# Patient Record
Sex: FEMALE | URBAN - METROPOLITAN AREA
[De-identification: names, ages, dates, MRNs, and addresses within clinical notes are randomized per-mention and may not be internally consistent; named-entity substitution may affect disease eponyms.]

---

## 2017-03-10 ENCOUNTER — TRANSFERRED RECORDS (OUTPATIENT)
Dept: HEALTH INFORMATION MANAGEMENT | Facility: CLINIC | Age: 77
End: 2017-03-10

## 2017-04-13 ENCOUNTER — TRANSFERRED RECORDS (OUTPATIENT)
Dept: HEALTH INFORMATION MANAGEMENT | Facility: CLINIC | Age: 77
End: 2017-04-13

## 2017-04-14 ENCOUNTER — TELEPHONE (OUTPATIENT)
Dept: RADIATION ONCOLOGY | Facility: CLINIC | Age: 77
End: 2017-04-14

## 2017-04-14 DIAGNOSIS — C52 MALIGNANT NEOPLASM OF VAGINA (H): Primary | ICD-10-CM

## 2017-04-14 NOTE — TELEPHONE ENCOUNTER
Called Savannah regarding referral from Dr. Geraldine Mcbride for brachytherapy.  Discussed routine would be to schedule pelvic MRI and consultation with MD.  She could decide also to have external beam therapy here if she would want to stay at the [facility].  She said she would think about that option.  Rad Onc  will call her with appointments.

## 2017-04-19 ENCOUNTER — HOSPITAL ENCOUNTER (OUTPATIENT)
Dept: MRI IMAGING | Facility: CLINIC | Age: 77
Discharge: HOME | End: 2017-04-19
Attending: RADIOLOGY | Admitting: RADIOLOGY
Payer: MEDICARE

## 2017-04-19 ENCOUNTER — OFFICE VISIT (OUTPATIENT)
Dept: RADIATION ONCOLOGY | Facility: CLINIC | Age: 77
End: 2017-04-19
Attending: RADIOLOGY
Payer: MEDICARE

## 2017-04-19 VITALS
BODY MASS INDEX: 25.1 KG/M2 | SYSTOLIC BLOOD PRESSURE: 155 MMHG | HEIGHT: 64 IN | DIASTOLIC BLOOD PRESSURE: 88 MMHG | WEIGHT: 147 LBS

## 2017-04-19 DIAGNOSIS — C53.1 MALIGNANT NEOPLASM OF EXOCERVIX (H): Primary | ICD-10-CM

## 2017-04-19 DIAGNOSIS — C52 MALIGNANT NEOPLASM OF VAGINA (H): ICD-10-CM

## 2017-04-19 PROCEDURE — 72197 MRI PELVIS W/O & W/DYE: CPT

## 2017-04-19 PROCEDURE — 00000346 ZZHCL STATISTIC REVIEW OUTSIDE SLIDES TC 88321: Performed by: RADIOLOGY

## 2017-04-19 PROCEDURE — A9585 GADOBUTROL INJECTION: HCPCS | Performed by: RADIOLOGY

## 2017-04-19 PROCEDURE — 99215 OFFICE O/P EST HI 40 MIN: CPT | Mod: 25 | Performed by: RADIOLOGY

## 2017-04-19 PROCEDURE — 00000159 ZZHCL STATISTIC H-SEND OUTS PREP: Performed by: RADIOLOGY

## 2017-04-19 PROCEDURE — 87624 HPV HI-RISK TYP POOLED RSLT: CPT | Performed by: RADIOLOGY

## 2017-04-19 PROCEDURE — 25500064 ZZH RX 255 OP 636: Performed by: RADIOLOGY

## 2017-04-19 RX ORDER — ALBUTEROL SULFATE 90 UG/1
2 AEROSOL, METERED RESPIRATORY (INHALATION) EVERY 6 HOURS PRN
COMMUNITY

## 2017-04-19 RX ORDER — GADOBUTROL 604.72 MG/ML
7.5 INJECTION INTRAVENOUS ONCE
Status: COMPLETED | OUTPATIENT
Start: 2017-04-19 | End: 2017-04-19

## 2017-04-19 RX ADMIN — GADOBUTROL 7 ML: 604.72 INJECTION INTRAVENOUS at 08:32

## 2017-04-19 ASSESSMENT — ENCOUNTER SYMPTOMS
TINGLING: 0
PND: 0
WHEEZING: 0
NAUSEA: 0
CLAUDICATION: 0
FOCAL WEAKNESS: 0
EYE REDNESS: 0
FREQUENCY: 0
HEARTBURN: 0
CONSTIPATION: 0
FLANK PAIN: 0
INSOMNIA: 0
DEPRESSION: 0
HEMOPTYSIS: 0
FEVER: 0
EYE DISCHARGE: 0
SPEECH CHANGE: 0
SHORTNESS OF BREATH: 0
NERVOUS/ANXIOUS: 0
WEAKNESS: 0
NECK PAIN: 0
SENSORY CHANGE: 0
DYSURIA: 0
EYE PAIN: 0
BLURRED VISION: 0
HALLUCINATIONS: 0
DIZZINESS: 0
FALLS: 0
PHOTOPHOBIA: 0
CHILLS: 0
ORTHOPNEA: 0
DIAPHORESIS: 0
SEIZURES: 0
TREMORS: 0
DIARRHEA: 0
WEIGHT LOSS: 0
VOMITING: 0
HEADACHES: 0
DOUBLE VISION: 0
PALPITATIONS: 0
POLYDIPSIA: 0
MYALGIAS: 0
BACK PAIN: 0
LOSS OF CONSCIOUSNESS: 0
BRUISES/BLEEDS EASILY: 0
BLOOD IN STOOL: 0
SPUTUM PRODUCTION: 0
SORE THROAT: 0
ABDOMINAL PAIN: 0
STRIDOR: 0
COUGH: 0
HEMATURIA: 0
MEMORY LOSS: 0

## 2017-04-19 ASSESSMENT — LIFESTYLE VARIABLES: SUBSTANCE_ABUSE: 0

## 2017-04-19 NOTE — PROGRESS NOTES
HPI  INITIAL PATIENT ASSESSMENT    Diagnosis: Vaginal Cancer    Prior radiation therapy: None    Prior chemotherapy: None    Prior hormonal therapy:No    Pain Eval:  Denies    Psychosocial  Living arrangements: Live with   Fall Risk: independent   referral needs: Not needed    Advanced Directive: No  Implantable Cardiac Device? No    Onset of menarche: 15  LMP: No LMP recorded.  Onset of menopause: 42  Abnormal vaginal bleeding/discharge: Yes, has been spotting for 2 months  Are you pregnant? No      Review of Systems   Constitutional: Positive for malaise/fatigue. Negative for chills, diaphoresis, fever and weight loss.   HENT: Positive for tinnitus. Negative for congestion, ear discharge, ear pain, hearing loss, nosebleeds and sore throat.         Baseline tinnitus   Eyes: Negative for blurred vision, double vision, photophobia, pain, discharge and redness.   Respiratory: Negative for cough, hemoptysis, sputum production, shortness of breath, wheezing and stridor.    Cardiovascular: Negative for chest pain, palpitations, orthopnea, claudication, leg swelling and PND.   Gastrointestinal: Negative for abdominal pain, blood in stool, constipation, diarrhea, heartburn, melena, nausea and vomiting.   Genitourinary: Negative for dysuria, flank pain, frequency, hematuria and urgency.   Musculoskeletal: Negative for back pain, falls, joint pain, myalgias and neck pain.   Skin: Negative for itching and rash.   Neurological: Negative for dizziness, tingling, tremors, sensory change, speech change, focal weakness, seizures, loss of consciousness, weakness and headaches.   Endo/Heme/Allergies: Negative for environmental allergies and polydipsia. Does not bruise/bleed easily.   Psychiatric/Behavioral: Negative for depression, hallucinations, memory loss, substance abuse and suicidal ideas. The patient is not nervous/anxious and does not have insomnia.

## 2017-04-19 NOTE — MR AVS SNAPSHOT
After Visit Summary   4/19/2017    Savannah Camacho    MRN: [id number]           Patient Information     Date Of Birth          1940        Visit Information        Provider Department      4/19/2017 9:00 AM Michelle Carrion MD Radiation Oncology Clinic        Today's Diagnoses     Malignant neoplasm of exocervix (H)    -  1       Follow-ups after your visit        Your next 10 appointments already scheduled     Apr 20, 2017 10:30 AM CDT   PET ONCOLOGY WHOLE BODY with UMET1   [facility] ([facility])    [address] [address]   [phone]           Tell your doctor:   If there is any chance you may be pregnant or if you are breastfeeding.   If you have problems lying in small spaces (claustrophobia). If you do, your doctor may give you medicine to help you relax. If you have diabetes:   Have your exam early in the morning. Your blood glucose will go up as the day goes by.   Your glucose level must be 180 or less at the start of the exam. Please take any medicines you need to ensure this blood glucose level. 24 hours before your scan: Don t do any heavy exercise. (No jogging, aerobics or other workouts.) Exercise will make your pictures less accurate. 6 hours before your scan:   Stop all food and liquids (except water).   Do not chew gum or suck on mints.   If you need to take medicine with food, you may take it with a few crackers.  Please call your Imaging Department at your exam site with any questions.            Apr 20, 2017  2:00 PM CDT   TCT/SIM Suite Visit with Michelle Carrion MD   Radiation Oncology Clinic ([facility])    [facility]  1st Floor  [address] [address] [phone]              Future tests that were ordered for you today     Open Future Orders        Priority Expected Expires Ordered    PET Oncology Whole Body Routine  4/19/2018 4/19/2017    NM PET Eyes to  "thigh chest abd pelvis w/o IV Routine  2018            Who to contact     Please call your clinic at [phone] to:    Ask questions about your health    Make or cancel appointments    Discuss your medicines    Learn about your test results    Speak to your doctor   If you have compliments or concerns about an experience at your clinic, or if you wish to file a complaint, please contact [facility] Patient Relations at [phone] or email us at [email or web address]         Additional Information About Your Visit        Scann[employer] Information     AlignMed is an electronic gateway that provides easy, online access to your medical records. With AlignMed, you can request a clinic appointment, read your test results, renew a prescription or communicate with your care team.     To sign up for AlignMed visit the website at [email or web address]   You will be asked to enter the access code listed below, as well as some personal information. Please follow the directions to create your username and password.     Your access code is: [email or web address]  Expires: 2017 11:20 AM     Your access code will  in 90 days. If you need help or a new code, please contact your [facility] Clinic or call [phone] for assistance.        Care EveryWhere ID     This is your Care EveryWhere ID. This could be used by other organizations to access your [facility] medical records  [id number]        Your Vitals Were     Height BMI (Body Mass Index)                1.626 m (5' 4\") 25.23 kg/m2           Blood Pressure from Last 3 Encounters:   17 155/88    Weight from Last 3 Encounters:   17 66.7 kg (147 lb)               Primary Care Provider Office Phone # Fax #    Adán Lanier [phone]       [place] [address] [address] [facility] [address]        Thank you!     Thank you for choosing [facility]  for " your care. Our goal is always to provide you with excellent care. Hearing back from our patients is one way we can continue to improve our services. Please take a few minutes to complete the written survey that you may receive in the mail after your visit with us. Thank you!             Your Updated Medication List - Protect others around you: Learn how to safely use, store and throw away your medicines at www.disposemymeds.org.          This list is accurate as of: 4/19/17 11:20 AM.  Always use your most recent med list.                   Brand Name Dispense Instructions for use    albuterol 108 (90 BASE) MCG/ACT Inhaler    PROAIR HFA/PROVENTIL HFA/VENTOLIN HFA     Inhale 2 puffs into the lungs every 6 hours       ASPIRIN PO      Take 81 mg by mouth       COREG PO      Take 6.25 mg by mouth       HYDROCHLOROTHIAZIDE PO      Take 12.5 mg by mouth daily       LISINOPRIL PO      Take 5 mg by mouth       MULTIVITAMIN ADULT PO          SIMVASTATIN PO      Take 40 mg by mouth       SINGULAIR PO      Take 10 mg by mouth

## 2017-04-19 NOTE — LETTER
2017       RE: Savannah Camacho  [address]   [place] [facility] [address]     Dear Colleague,    Thank you for referring your patient, Savannah Camacho, to the RAD[facility] ONCOL[facility]Y CLINIC. Please see a copy of my visit note below.    Dictation pendin      HPI  INITIAL PATIENT ASSESSMENT    Diagnosis: Vaginal Cancer    Prior radiation therapy: None    Prior chemotherapy: None    Prior hormonal therapy:No    Pain Eval:  Denies    Psychosocial  Living arrangements: Live with   Fall Risk: independent   referral needs: Not needed    Advanced Directive: No  Implantable Cardiac Device? No    Onset of menarche: 15  LMP: No LMP recorded.  Onset of menopause: 42  Abnormal vaginal bleeding/discharge: Yes, has been spotting for 2 months  Are you pregnant? No      Review of Systems   Constitutional: Positive for malaise/fatigue. Negative for chills, diaphoresis, fever and weight loss.   HENT: Positive for tinnitus. Negative for congestion, ear discharge, ear pain, hearing loss, nosebleeds and sore throat.         Baseline tinnitus   Eyes: Negative for blurred vision, double vision, photophobia, pain, discharge and redness.   Respiratory: Negative for cough, hemoptysis, sputum production, shortness of breath, wheezing and stridor.    Cardiovascular: Negative for chest pain, palpitations, orthopnea, claudication, leg swelling and PND.   Gastrointestinal: Negative for abdominal pain, blood in stool, constipation, diarrhea, heartburn, melena, nausea and vomiting.   Genitourinary: Negative for dysuria, flank pain, frequency, hematuria and urgency.   Musculoskeletal: Negative for back pain, falls, joint pain, myalgias and neck pain.   Skin: Negative for itching and rash.   Neurological: Negative for dizziness, tingling, tremors, sensory change, speech change, focal weakness, seizures, loss of consciousness, weakness and headaches.   Endo/Heme/Allergies: Negative for environmental allergies and  polydipsia. Does not bruise/bleed easily.   Psychiatric/Behavioral: Negative for depression, hallucinations, memory loss, substance abuse and suicidal ideas. The patient is not nervous/anxious and does not have insomnia.                  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      HISTORY OF PRESENT ILLNESS:  Ms. Camacho is a 77-year-old female with a recent diagnosis of squamous cell carcinoma of the cervix, FIGO stage IIB.  Her history dates back to 2012 when she initially presented with vaginal discharge and dysuria.  A physical exam at that time demonstrated a plaque in the right upper vagina, which was biopsied on 09/21/2012.  This was positive only for hyperkeratosis with no evidence of dysplasia or malignancy.  She was treated with topical steroids which led to significant improvement and eventual resolution of the plaque.  She continued to do well for several years.  She had a routine Pap smear on 07/01/2014.  This demonstrated atypical squamous cells of undetermined significance.  She did not have any additional workup for this at that time.     More recently, she was seen by Dr. Mcdonough on 02/23/2017 for increasing vaginal discharge which she described as brown colored and foul smelling.  A physical exam at that time demonstrated a plaque on the anterior vaginal wall with extension into the right fornix.  A Pap smear was completed which showed atypical squamous cells of undetermined significance.  HPV was negative.  She was then taken to the OR on 03/10/2017 for an exam under anesthesia completed by Dr. Mcdonough.  There was proximal vaginal thickening of both the anterior and posterior vaginal walls, noted to be primarily on the right side with narrowing of the proximal vaginal canal.  No cervix was able to be visualized or palpated on bimanual exam.  A colposcopy was also completed at that time,  which showed a confluent area of lesions in the proximal vagina mostly confined to the anterior wall.  This area measured approximately 2 x 3 cm and appeared to have a whitish appearance.  There was extension into the right vaginal fornix, where there was an area of focal necrosis.   Biopsies were taken of the right vaginal wall and posterior vaginal wal.  Both of these were positive for well-differentiated squamous cell carcinoma.  A blind biopsy was also taken of the cervix which was positive only for squamous dysplasia.  At this time, she was diagnosed with squamous cell carcinoma of the vagina, as there was no clear involvement of the cervix.      She was subsequently referred to both Dr. Valdez with Gynecologic Oncology and Dr. Chandler with Radiation Oncology.  It was recommended that she undergo evaluation with a CT of the chest, abdomen and pelvis which was completed on 04/13/2017.  The report stated that the uterus and adnexa appeared grossly unremarkable with no pelvic adenopathy appreciated and no free fluid within the pelvis.  There was no comment on the appearance of the vagina or cervix.  However, as Dr. Chandler noted, the proximal vagina does appear to be thickened primarily on the right side.  She met with Dr. Valdez and Dr. Chandler later that day.  They both explained that she was currently felt to have a squamous cell carcinoma of the vagina, which is a rare disease with no large scale studies to provide well-established treatment recommendations.  Dr. Valdez did not feel that she would be a candidate for surgery and felt that she would likely require radiation therapy likely with concurrent chemotherapy.  Dr. Chandler agreed with this recommendation and explained that she require external beam radiation therapy to the pelvis followed by a brachytherapy boost.  He subsequently referred her to our clinic for further discussion regarding treatment with brachytherapy.      Prior to our meeting today,  Ms. Camacho underwent further evaluation with a pelvic MRI.  This demonstrated a large mass arising from the upper vagina on the right side with involvement of the right lateral cervix, posterior proximal vaginal wall, and possible involvement of the mid rectum.  There was evidence of parametrial involvement on the right. The official report was not available at the time of consultation; however, we were able to briefly discuss the images with Radiology prior to our visit.  Clinically, Ms. Camacho reports feeling very well.  She reports intermittent light spotting.  She denies any abdominal pain or discomfort.  She otherwise feels in her normal state of health and remainder of ROS is otherwise negative.      PAST MEDICAL HISTORY:   1.  Asthma.   2.  Congestive heart failure.   3.  Hyperlipidemia.   4.  History of lung nodule in right apex.   5.  History of myocardial infarction.   6.  Cervical cancer, per HPI.      PAST SURGICAL HISTORY:     1.  Biopsy of the vaginal mass, per HPI.   2.  Heart catheterization with stent placement in 2009.        CHEMOTHERAPY HISTORY:  None.        RADIATION THERAPY HISTORY:  None.        PREGNANCY STATUS:  No.  The patient is postmenopausal.      IMPLANTED CARDIAC DEVICE:  No.        CURRENT MEDICATIONS:   1.  Singulair.   2.  Hydrochlorothiazide.   3.  Lisinopril.   4.  Simvastatin.   5.  Carvedilol.   6.  Albuterol.   7.  Aspirin, 81 mg daily.      ALLERGIES:  Amoxicillin.      SOCIAL HISTORY:  Ms. Camacho is  and lives with her  in [place], [place].  She is currently retired.  She has no history of tobacco use.  She drinks alcohol on social occasions.      FAMILY HISTORY:  Daughter had breast cancer.      REVIEW OF SYSTEMS:  A 10-point review of systems was performed.  Pertinent findings are noted in the HPI.      PHYSICAL EXAMINATION:   VITAL SIGNS:  Blood pressure 155/88, weight 66.7 kg. BMI 25.23 kg/m2.   GENERAL:  Alert, in no acute distress.    PULMONARY:  No wheezing, stridor or respiratory distress.   CARDIOVASCULAR:  Well perfused, no cyanosis, no pedal edema.     GYN:  Normal external genitalia  Erythematous area in the anterior proximal vaginal wall with white plaque.  This area measures at least 3 cm x 3 cm.  No clear mucosal changes of the posterior vaginal wall.  There is significant stenosis at the upper vagina and the cervix could not be visualized nor palpated.  The right vaginal fornix has evidence of recent biopsy but dos appear necrotic.    MUSCULOSKELETAL:  Normal muscle bulk and tone.   SKIN:  Normal color and turgor.      ASSESSMENT:  Ms. Camacho is a 77-year-old female with original diagnosis of squamous cell carcinoma of the vagina with MRI today demonstrating involvement of the cervix and potential involvement of the middle rectum.  Thus, her diagnosis is now squamous cell carcinoma of the cervix, FIGO stage IIB.      PLAN:  We reviewed the patient's clinical history including her presentation, diagnosis and workup including the MRI obtained from earlier today.  We explained that her MRI from today demonstrates involvement of the cervix.  For this reason, she is considered to have cervical cancer as opposed to vaginal cancer.  The MRI also showed parametrial involvement with disease confined to the proximal two-thirds of the vagina.  There does appear to be an area concerning for possible involvement within the mid rectum as well.  We explained that the treatment for locally advanced cervical cancer generally includes concurrent chemotherapy and radiation.  As with vaginal cancer, the radiation would consist of external beam radiation to the pelvis followed by an interstitial brachytherapy boost.  The reason for interstitial brachytherapy as opposed to intracavitary brachytherapy is due to extensive vaginal invovlement which could not be covered with tandem and ring.  We would also recommend that she undergo further evaluation with a  PET CT scan.  We would like her to be evaluated by one of the gynecologic oncologists at [facility].  We will plan to discuss her case at Gynecology Tumor Conference tomorrow morning.  Given the new diagnosis of cervical cancer, Ms. Camacho would like to have both her external beam radiation and brachytherapy completed at [facility].  Given that she lives in [place], we will schedule her for a CT simulation and PET CT to be completed tomorrow.  We reviewed the process of, side effects and potential complications of both external beam radiation therapy to the pelvis and interstitial brachytherapy.  The patient, her  and son all asked appropriate questions, which were answered to their satisfaction and verbalized understanding.  An informed consent was signed.  She will return tomorrow for CT simulation.        Bobby Ward MD   Resident, PGY-4  Radiation Oncology     I saw and examined the patient with the resident.  I have reviewed and agree with the resident's note and plan of care.      Michelle Carrion MD

## 2017-04-20 ENCOUNTER — ALLIED HEALTH/NURSE VISIT (OUTPATIENT)
Dept: RADIATION ONCOLOGY | Facility: CLINIC | Age: 77
End: 2017-04-20
Attending: RADIOLOGY
Payer: MEDICARE

## 2017-04-20 ENCOUNTER — CARE COORDINATION (OUTPATIENT)
Dept: ONCOLOGY | Facility: CLINIC | Age: 77
End: 2017-04-20

## 2017-04-20 ENCOUNTER — ONCOLOGY VISIT (OUTPATIENT)
Dept: ONCOLOGY | Facility: CLINIC | Age: 77
End: 2017-04-20
Attending: OBSTETRICS & GYNECOLOGY
Payer: MEDICARE

## 2017-04-20 VITALS
HEIGHT: 64 IN | BODY MASS INDEX: 24.34 KG/M2 | DIASTOLIC BLOOD PRESSURE: 76 MMHG | WEIGHT: 142.6 LBS | OXYGEN SATURATION: 98 % | SYSTOLIC BLOOD PRESSURE: 135 MMHG | HEART RATE: 72 BPM | RESPIRATION RATE: 16 BRPM

## 2017-04-20 DIAGNOSIS — C53.1 MALIGNANT NEOPLASM OF EXOCERVIX (H): Primary | ICD-10-CM

## 2017-04-20 DIAGNOSIS — C52 VAGINAL CANCER (H): Primary | ICD-10-CM

## 2017-04-20 PROBLEM — C53.9 CERVIX CANCER (H): Status: ACTIVE | Noted: 2017-04-20

## 2017-04-20 PROCEDURE — 99205 OFFICE O/P NEW HI 60 MIN: CPT | Mod: ZP | Performed by: OBSTETRICS & GYNECOLOGY

## 2017-04-20 PROCEDURE — 77470 SPECIAL RADIATION TREATMENT: CPT | Performed by: RADIOLOGY

## 2017-04-20 PROCEDURE — 99212 OFFICE O/P EST SF 10 MIN: CPT | Mod: ZF

## 2017-04-20 PROCEDURE — 77334 RADIATION TREATMENT AID(S): CPT | Performed by: RADIOLOGY

## 2017-04-20 ASSESSMENT — PAIN SCALES - GENERAL: PAINLEVEL: NO PAIN (0)

## 2017-04-20 NOTE — MR AVS SNAPSHOT
After Visit Summary   2017    Savannah Camacho    MRN: [id number]           Patient Information     Date Of Birth          1940        Visit Information        Provider Department      2017 2:00 PM Michelle Carrion MD Radiation Oncology Clinic        Today's Diagnoses     Vaginal cancer (H)    -  1       Follow-ups after your visit        Future tests that were ordered for you today     Open Future Orders        Priority Expected Expires Ordered    NM PET Eyes to thigh chest abd pelvis w/o IV Routine  2018            Who to contact     Please call your clinic at [phone] to:    Ask questions about your health    Make or cancel appointments    Discuss your medicines    Learn about your test results    Speak to your doctor   If you have compliments or concerns about an experience at your clinic, or if you wish to file a complaint, please contact [facility] Patient Relations at [phone] or email us at [email or web address]         Additional Information About Your Visit        MyChart Information     [employer] [place] [employer] is an electronic gateway that provides easy, online access to your medical records. With [employer] [place] [employer], you can request a clinic appointment, read your test results, renew a prescription or communicate with your care team.     To sign up for [employer] & Awayt visit the website at [email or web address]   You will be asked to enter the access code listed below, as well as some personal information. Please follow the directions to create your username and password.     Your access code is: [email or web address]  Expires: 2017 11:20 AM     Your access code will  in 90 days. If you need help or a new code, please contact your [facility] or call [phone] for assistance.        Care EveryWhere ID     This is your Care EveryWhere ID. This could be used by other organizations to access your [facility]  records  [id number]         Blood Pressure from Last 3 Encounters:   04/19/17 155/88    Weight from Last 3 Encounters:   04/19/17 66.7 kg (147 lb)              Today, you had the following     No orders found for display       Primary Care Provider Office Phone # Fax #    Adán Lanier [phone]       [address]   [facility] [address]        Thank you!     Thank you for choosing [facility]  for your care. Our goal is always to provide you with excellent care. Hearing back from our patients is one way we can continue to improve our services. Please take a few minutes to complete the written survey that you may receive in the mail after your visit with us. Thank you!             Your Updated Medication List - Protect others around you: Learn how to safely use, store and throw away your medicines at www.disposemymeds.org.          This list is accurate as of: 4/20/17  3:01 PM.  Always use your most recent med list.                   Brand Name Dispense Instructions for use    albuterol 108 (90 BASE) MCG/ACT Inhaler    PROAIR HFA/PROVENTIL HFA/VENTOLIN HFA     Inhale 2 puffs into the lungs every 6 hours       ASPIRIN PO      Take 81 mg by mouth       COREG PO      Take 6.25 mg by mouth       HYDROCHLOROTHIAZIDE PO      Take 12.5 mg by mouth daily       LISINOPRIL PO      Take 5 mg by mouth       MULTIVITAMIN ADULT PO          SIMVASTATIN PO      Take 40 mg by mouth       SINGULAIR PO      Take 10 mg by mouth

## 2017-04-20 NOTE — PROGRESS NOTES
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      HISTORY OF PRESENT ILLNESS:  Ms. Camacho is a 77-year-old female with a recent diagnosis of squamous cell carcinoma of the cervix, FIGO stage IIB.  Her history dates back to 2012 when she initially presented with vaginal discharge and dysuria.  A physical exam at that time demonstrated a plaque in the right upper vagina, which was biopsied on 09/21/2012.  This was positive only for hyperkeratosis with no evidence of dysplasia or malignancy.  She was treated with topical steroids which led to significant improvement and eventual resolution of the plaque.  She continued to do well for several years.  She had a routine Pap smear on 07/01/2014.  This demonstrated atypical squamous cells of undetermined significance.  She did not have any additional workup for this at that time.     More recently, she was seen by Dr. Mcdonough on 02/23/2017 for increasing vaginal discharge which she described as brown colored and foul smelling.  A physical exam at that time demonstrated a plaque on the anterior vaginal wall with extension into the right fornix.  A Pap smear was completed which showed atypical squamous cells of undetermined significance.  HPV was negative.  She was then taken to the OR on 03/10/2017 for an exam under anesthesia completed by Dr. Mcdonough.  There was proximal vaginal thickening of both the anterior and posterior vaginal walls, noted to be primarily on the right side with narrowing of the proximal vaginal canal.  No cervix was able to be visualized or palpated on bimanual exam.  A colposcopy was also completed at that time, which showed a confluent area of lesions in the proximal vagina mostly confined to the anterior wall.  This area measured approximately 2 x 3 cm and appeared to have a whitish appearance.  There was extension into the right vaginal  fornix, where there was an area of focal necrosis.   Biopsies were taken of the right vaginal wall and posterior vaginal wal.  Both of these were positive for well-differentiated squamous cell carcinoma.  A blind biopsy was also taken of the cervix which was positive only for squamous dysplasia.  At this time, she was diagnosed with squamous cell carcinoma of the vagina, as there was no clear involvement of the cervix.      She was subsequently referred to both Dr. Valdez with Gynecologic Oncology and Dr. Chandler with Radiation Oncology.  It was recommended that she undergo evaluation with a CT of the chest, abdomen and pelvis which was completed on 04/13/2017.  The report stated that the uterus and adnexa appeared grossly unremarkable with no pelvic adenopathy appreciated and no free fluid within the pelvis.  There was no comment on the appearance of the vagina or cervix.  However, as Dr. Chandler noted, the proximal vagina does appear to be thickened primarily on the right side.  She met with Dr. Valdez and Dr. Chandler later that day.  They both explained that she was currently felt to have a squamous cell carcinoma of the vagina, which is a rare disease with no large scale studies to provide well-established treatment recommendations.  Dr. Valdez did not feel that she would be a candidate for surgery and felt that she would likely require radiation therapy likely with concurrent chemotherapy.  Dr. Chandler agreed with this recommendation and explained that she require external beam radiation therapy to the pelvis followed by a brachytherapy boost.  He subsequently referred her to our clinic for further discussion regarding treatment with brachytherapy.      Prior to our meeting today, Ms. Camacho underwent further evaluation with a pelvic MRI.  This demonstrated a large mass arising from the upper vagina on the right side with involvement of the right lateral cervix, posterior proximal vaginal wall, and possible  involvement of the mid rectum.  There was evidence of parametrial involvement on the right. The official report was not available at the time of consultation; however, we were able to briefly discuss the images with Radiology prior to our visit.  Clinically, Ms. Camacho reports feeling very well.  She reports intermittent light spotting.  She denies any abdominal pain or discomfort.  She otherwise feels in her normal state of health and remainder of ROS is otherwise negative.      PAST MEDICAL HISTORY:   1.  Asthma.   2.  Congestive heart failure.   3.  Hyperlipidemia.   4.  History of lung nodule in right apex.   5.  History of myocardial infarction.   6.  Cervical cancer, per HPI.      PAST SURGICAL HISTORY:     1.  Biopsy of the vaginal mass, per HPI.   2.  Heart catheterization with stent placement in 2009.        CHEMOTHERAPY HISTORY:  None.        RADIATION THERAPY HISTORY:  None.        PREGNANCY STATUS:  No.  The patient is postmenopausal.      IMPLANTED CARDIAC DEVICE:  No.        CURRENT MEDICATIONS:   1.  Singulair.   2.  Hydrochlorothiazide.   3.  Lisinopril.   4.  Simvastatin.   5.  Carvedilol.   6.  Albuterol.   7.  Aspirin, 81 mg daily.      ALLERGIES:  Amoxicillin.      SOCIAL HISTORY:  Ms. Caamcho is  and lives with her  in [place], [place].  She is currently retired.  She has no history of tobacco use.  She drinks alcohol on social occasions.      FAMILY HISTORY:  Daughter had breast cancer.      REVIEW OF SYSTEMS:  A 10-point review of systems was performed.  Pertinent findings are noted in the HPI.      PHYSICAL EXAMINATION:   VITAL SIGNS:  Blood pressure 155/88, weight 66.7 kg. BMI 25.23 kg/m2.   GENERAL:  Alert, in no acute distress.   PULMONARY:  No wheezing, stridor or respiratory distress.   CARDIOVASCULAR:  Well perfused, no cyanosis, no pedal edema.     GYN:  Normal external genitalia  Erythematous area in the anterior proximal vaginal wall with white plaque.   This area measures at least 3 cm x 3 cm.  No clear mucosal changes of the posterior vaginal wall.  There is significant stenosis at the upper vagina and the cervix could not be visualized nor palpated.  The right vaginal fornix has evidence of recent biopsy but dos appear necrotic.    MUSCULOSKELETAL:  Normal muscle bulk and tone.   SKIN:  Normal color and turgor.      ASSESSMENT:  Ms. Camacho is a 77-year-old female with original diagnosis of squamous cell carcinoma of the vagina with MRI today demonstrating involvement of the cervix and potential involvement of the middle rectum.  Thus, her diagnosis is now squamous cell carcinoma of the cervix, FIGO stage IIB.      PLAN:  We reviewed the patient's clinical history including her presentation, diagnosis and workup including the MRI obtained from earlier today.  We explained that her MRI from today demonstrates involvement of the cervix.  For this reason, she is considered to have cervical cancer as opposed to vaginal cancer.  The MRI also showed parametrial involvement with disease confined to the proximal two-thirds of the vagina.  There does appear to be an area concerning for possible involvement within the mid rectum as well.  We explained that the treatment for locally advanced cervical cancer generally includes concurrent chemotherapy and radiation.  As with vaginal cancer, the radiation would consist of external beam radiation to the pelvis followed by an interstitial brachytherapy boost.  The reason for interstitial brachytherapy as opposed to intracavitary brachytherapy is due to extensive vaginal invovlement which could not be covered with tandem and ring.  We would also recommend that she undergo further evaluation with a PET CT scan.  We would like her to be evaluated by one of the gynecologic oncologists at [facility].  We will plan to discuss her case at Gynecology Tumor Conference tomorrow morning.  Given the new diagnosis  of cervical cancer, Ms. Camacho would like to have both her external beam radiation and brachytherapy completed at [facility].  Given that she lives in [place], we will schedule her for a CT simulation and PET CT to be completed tomorrow.  We reviewed the process of, side effects and potential complications of both external beam radiation therapy to the pelvis and interstitial brachytherapy.  The patient, her  and son all asked appropriate questions, which were answered to their satisfaction and verbalized understanding.  An informed consent was signed.  She will return tomorrow for CT simulation.        Bobby Ward MD   Resident, PGY-4  Radiation Oncology     I saw and examined the patient with the resident.  I have reviewed and agree with the resident's note and plan of care.      Michelle Carrion MD

## 2017-04-20 NOTE — PROGRESS NOTES
Consult Notes on Referred Patient        Dr. Adán Lanier MD  [address] [place] [address]       RE: Savannah Camacho  : 1940  FEMI: 2017    Dear Dr. Adán Lanier:    I had the pleasure of seeing your patient Savannah Camacho here at the Gynecologic Cancer Clinic at the [facility] on 2017.  As you know she is a very pleasant 77 year old woman with a recent diagnosis of clinical stage IIA2, radiographic stage IIB.  Given these findings she was subsequently sent to the Gynecologic Cancer Clinic for new patient consultation.  She is accompanied today by her  and son.    Cancer Course:    She initially sought care from a gynecologist in 2012 at which time she was noted to have a plaque of her vagina which was biopsied and found to be hyperkeratosis.  She was thus treated with steroids and kenolog with substantial improvement in her symptoms.  She then had a pap smear with ASCUS, HPV negative in .  Most recently she has developed increasing, foul smelling vaginal discharge and again sought care from her gynecologist who rightfully took her to the OR for EUA and biopsies.  On EUA she was found to have a confluent area of lesions in the proximal vagina mostly confined to the anterior wall. This area measured approximately 2 x 3 cm and appeared to have a whitish appearance. There was extension into the right vaginal fornix, where there was an area of necrosis appreciated. Again, the cervix was not able to be visualized on exam. Biopsies were taken of the right vaginal wall and posterior vaginal wall. Both of these were positive for well-differentiated squamous cell carcinoma.     3/10/17:  Upper vaginal biopsy:  Well differentiated squamous cell carcinoma    17:  MRI Pelvis:  Large exophytic heterogeneously enhancing with necrotic areas is seen arising from the upper vagina/fornix on the right side measuring 3.4 x 3.6 x 4 cm.  Superiorly the lesion appears to involve the right lateral aspect of the cervix with extension into the parametrium. No extension into the cervical canal. No definite extension into the uterus. Posteriorly there is involvement of the posterior wall of the upper vagina with extension across the midline to the left side. The lesion also abuts the midportion of the rectum with possible involvement.  Thrombosis of a right parametrial venous structure is noted.  A prominent right internal iliac lymph node measures 5 mm in short axis.  Small amount of free fluid is noted in the pelvis. Visualized bowel loops otherwise appear unremarkable. Pelvic musculature is of normal appearance. No suspicious osseous lesions.    17:  PET/CT:  Results pending           Review of Systems:  Systemic           no weight changes; no fever; no chills; no night sweats; no appetite changes  Skin           no rashes, or lesions  Eye           no irritation; no changes in vision  Marlene-Laryngeal           no dysphagia; no hoarseness   Pulmonary    no cough; no shortness of breath  Cardiovascular    no chest pain; no palpitations  Gastrointestinal    no diarrhea; no constipation; no abdominal pain; no changes in bowel  habits; no blood in stool  Genitourinary   no urinary frequency; no urinary urgency; no dysuria; no pain; + abnormal vaginal discharge; + abnormal vaginal bleeding  Breast    no breast discharge; no breast changes; no breast pain  Musculoskeletal    no myalgias; no arthralgias; no back pain  Psychiatric           no depressed mood; no anxiety    Hematologic            no tender lymph nodes; no noticeable swellings or lumps   Endocrine    no hot flashes; no heat/cold intolerance         Neurological   no tremor; no numbness and tingling; no headaches; no difficulty sleeping    Obstetrics and Gynecology History:  ,  x 4  Menopause in early 40's, HRT for a couple years    Past Medical History:  Past Medical History:    Diagnosis Date     Asthma      Cervical cancer (H)      Congestive heart failure (CHF) (H)      Hyperlipidemia      Lung nodule     Long standing Rt apical lung nodule     MI (myocardial infarction) (H) 2009    involving anterior wall        Past Surgical History:  Past Surgical History:   Procedure Laterality Date     BIOPSY OF VAGINA,SIMPLE  02/2017    also bx from 2012,      COLONOSCOPY       HEART CATH STENT COR W/WO PTCA  2009       Health Maintenance:  Last Pap Smear: See HPI    Last Mammogram: 2016              Result: normal      She has not had a history of abnormal mammograms.    Last Colonoscopy: 2011              Result: normal-repeat 10 years      Current Medications:   has a current medication list which includes the following prescription(s): montelukast sodium, lisinopril, simvastatin, carvedilol, multiple vitamins-minerals, aspirin, hydrochlorothiazide, and albuterol.     Allergies:   Amoxicillin      Social History:  Social History     Social History     Marital status:      Spouse name: N/A     Number of children: N/A     Years of education: N/A     Occupational History     Not on file.     Social History Main Topics     Smoking status: Never Smoker     Smokeless tobacco: Never Used     Alcohol use Yes     Drug use: No     Sexual activity: Not on file     Other Topics Concern     Not on file     Social History Narrative       Lives with , feels safe at home.  Retired from a dental office.  From [place].  Enjoys exercise, playing bridge, spending time with family and friends.  Does not have an advanced directive on file and would like her  to be her POA.  Would like full resuscitation if reversible cause is identified, however would not like to be kept on life sustaining measures long-term.     Family History:   The patient's family history is significant for.  Family History   Problem Relation Age of Onset     CANCER Other      Daughter had Breast Cancer at age  "39         Physical Exam:   /76  Pulse 72  Resp 16  Ht 1.626 m (5' 4\")  Wt 64.7 kg (142 lb 9.6 oz)  SpO2 98%  Breastfeeding? No  BMI 24.48 kg/m2  Body mass index is 24.48 kg/(m^2).    General Appearance: healthy and alert, no distress     HEENT:  no thyromegaly, no palpable nodules or masses        Cardiovascular: regular rate and rhythm, no gallops, rubs or murmurs     Respiratory: lungs clear, no rales, rhonchi or wheezes, normal diaphragmatic excursion    Musculoskeletal: extremities non tender and without edema    Skin: no lesions or rashes     Neurological: normal gait, no gross defects     Psychiatric: appropriate mood and affect                               Hematological: normal cervical, supraclavicular and inguinal lymph nodes     Gastrointestinal:       abdomen soft, non-tender, non-distended, no organomegaly or masses    Genitourinary: External genitalia and urethral meatus appears normal.  The lower two thirds of the vagina is smooth and normal in appearance.  The upper vagina is almost completely replaced with a necrotic, foul smelling tumor.  It is difficult to appreciate a cervix as this tumor somewhat obliterates the vagina with what seems to be stenosis and scarring of the upper vagina.  There is not parametrial extension.  Recto-vaginal exam confirms these findings.      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     A total of 60 minutes was spent with the patient, >50% of which were spent in counseling the patient and/or treatment planning.      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  We discussed that while most of her tumor is involving the vagina, this is classified as a cervical cancer as it does appear to abut the cervix on MRI.  We discussed the recommendation for definitive chemoradiation therapy given the " size and location of her tumor.  We discussed the rationale for chemotherapy potentiation with cisplatin.  Side effects of chemotherapy were discussed.  She does have a PET scan pending and this will be followed up on to ensure no metastatic disease, however she did have a recent CT C/A/P in [place] that was negative for any metastatic disease.  We will plan to begin chemoradiation on 5/2/17.  She has already met with radiation oncology and has had her planning CT earlier today.  She has already had arrangements made to stay at the [facility] during her treatments.     2.) Genetic risk factors were assessed and the patient does not meet the qualifications for a referral.      3.) Labs and/or tests ordered include:  None.     4.) Health maintenance issues addressed today include pt is up to date.    5.) Chemotherapy teaching was completed today.        Thank you for allowing us to participate in the care of your patient.         Sincerely,    Janet Raines MD  Gynecologic Oncology  [facility]       NELLIE LEROY

## 2017-04-20 NOTE — MR AVS SNAPSHOT
After Visit Summary   4/20/2017    Savannah Camacho    MRN: [id number]           Patient Information     Date Of Birth          1940        Visit Information        Provider Department      4/20/2017 3:00 PM Janet Magallanes MD [facility]        Today's Diagnoses     Malignant neoplasm of exocervix (H)    -  1       Follow-ups after your visit        Your next 10 appointments already scheduled     May 01, 2017 12:30 PM CDT   EXTERNAL RADIATION TREATMENT with UMP RAD ONC VARIAN   Radiation Oncology Clinic ([facility])    [facility]  1st Floor  [address] [address]   [phone]            May 02, 2017  2:30 PM CDT   EXTERNAL RADIATION TREATMENT with UMP RAD ONC VARIAN   Radiation Oncology Clinic ([facility])    [facility]  1st Floor  [address] [address]   [phone]            May 03, 2017  2:30 PM CDT   EXTERNAL RADIATION TREATMENT with UMP RAD ONC VARIAN   Radiation Oncology Clinic ([facility])    [facility]  1st Floor  [address] [address]   [phone]            May 04, 2017  2:30 PM CDT   EXTERNAL RADIATION TREATMENT with UMP RAD ONC VARIAN   Radiation Oncology Clinic ([facility])    [facility]  1st Floor  [address] [address]   [phone]            May 04, 2017  2:45 PM CDT   ON TREATMENT VISIT with Michelle Carrion MD   Radiation Oncology Clinic ([facility])    [facility]  1st Floor  [address] [address]   [phone]            May 05, 2017  2:30 PM CDT   EXTERNAL RADIATION TREATMENT with UMP RAD ONC VARIAN   Radiation Oncology Clinic ([facility])    [facility]  1st Floor  [address] [address]   [phone]        "     May 08, 2017  2:30 PM CDT   EXTERNAL RADIATION TREATMENT with UMP RAD ONC VARIAN   Radiation Oncology Clinic ([facility])    [facility]  1st Floor  [address] [facility] [address]   [phone]            May 09, 2017  8:00 AM CDT   Infusion 360 with UC ONCOLOGY INFUSION, UC 14 ATC   [facility] ([facility])    [address]  2nd Floor  [facility] [id number] [phone]            May 09, 2017  2:30 PM CDT   EXTERNAL RADIATION TREATMENT with UMP RAD ONC VARIAN   Radiation Oncology Clinic ([facility])    [facility]  1st Floor  [address] [address]   [phone]              Future tests that were ordered for you today     Open Future Orders        Priority Expected Expires Ordered    NM PET Eyes to thigh chest abd pelvis w/o IV Routine  4/19/2018 4/19/2017            Who to contact     If you have questions or need follow up information about today's clinic visit or your schedule please contact [facility] directly at [phone].  Normal or non-critical lab and imaging results will be communicated to you by Lift Agency[employer], letter or phone within 4 business days after the clinic has received the results. If you do not hear from us within 7 days, please contact the clinic through Manomasat or phone. If you have a critical or abnormal lab result, we will notify you by phone as soon as possible.  Submit refill requests through Here@ Networks or call your pharmacy and they will forward the refill request to us. Please allow 3 business days for your refill to be completed.          Additional Information About Your Visit        [employer] Information     Here@ Networks lets you send messages to your doctor, view your test results, renew your prescriptions, schedule appointments and more. To sign up, go to [email or web address] . Click on \"Log in\" on the left " "side of the screen, which will take you to the Welcome page. Then click on \"Sign up Now\" on the right side of the page.     You will be asked to enter the access code listed below, as well as some personal information. Please follow the directions to create your username and password.     Your access code is: [email or web address]  Expires: 2017 11:20 AM     Your access code will  in 90 days. If you need help or a new code, please call your [facility] or [phone].        Care EveryWhere ID     This is your Care EveryWhere ID. This could be used by other organizations to access your [facility] medical records  [id number]        Your Vitals Were     Pulse Respirations Height Pulse Oximetry Breastfeeding? BMI (Body Mass Index)    72 16 1.626 m (5' 4\") 98% No 24.48 kg/m2       Blood Pressure from Last 3 Encounters:   17 135/76   17 155/88    Weight from Last 3 Encounters:   17 64.7 kg (142 lb 9.6 oz)   17 66.7 kg (147 lb)              Today, you had the following     No orders found for display       Primary Care Provider Office Phone # Fax #    Adán Lanier [phone]       [address]   [facility] [address]        Thank you!     Thank you for choosing [facility]  for your care. Our goal is always to provide you with excellent care. Hearing back from our patients is one way we can continue to improve our services. Please take a few minutes to complete the written survey that you may receive in the mail after your visit with us. Thank you!             Your Updated Medication List - Protect others around you: Learn how to safely use, store and throw away your medicines at www.disposemymeds.org.          This list is accurate as of: 17 10:23 PM.  Always use your most recent med list.                   Brand Name Dispense Instructions for use    albuterol 108 (90 BASE) MCG/ACT Inhaler    PROAIR HFA/PROVENTIL " HFA/VENTOLIN HFA     Inhale 2 puffs into the lungs every 6 hours Reported on 4/20/2017       ASPIRIN PO      Take 81 mg by mouth daily       COREG PO      Take 6.25 mg by mouth 2 times daily (with meals)       HYDROCHLOROTHIAZIDE PO      Take 12.5 mg by mouth daily       LISINOPRIL PO      Take 5 mg by mouth 2 times daily       MULTIVITAMIN ADULT PO      Take by mouth daily       SIMVASTATIN PO      Take 40 mg by mouth once       SINGULAIR PO      Take 10 mg by mouth once

## 2017-04-20 NOTE — PROGRESS NOTES
[facility] GYN-Oncology Teaching Note    Relevant Diagnosis:  Cervix Cancer    Teaching Topic:  Chemotherapy:  Cisplatin Potentiation    Person(s) involved in teaching:  Patient and     Motivation Level:   Asks Questions:   Yes  Eager to Learn:  Yes  Cooperative:  Yes  Receptive (willing/able to accept information):  Yes      Patient demonstrates understanding of the following:  Reason for the appointment, diagnosis and treatment plan:  Yes  Knowledge of proper use of medications and conditions for which they are ordered (with special attention to potential side effects or drug interactions):  Yes  Which situations necessitate calling provider and whom to contact:  Yes    Teaching Concerns:  Plan Cisplatin 40 mg/m2 weekly with radiation therapy.  Overview of schedule reviewed with patient.  Plan that Shraddha will call patient when schedule has been finalized.   Patient plans to stay at [facility], forms were completed via radiation therapy staff.   will be staying with patient.  Patient's son will also be a .  Patient given Authorization to Discuss Protected Health Information form to complete.  Patient's son is a pharmacist.  Patient also has 2 grandchildren who live in the cities.     Instructional Materials Used/Given:  Chemotherapy Packet and Cards  Disease Packet :  Cervix Cancer   Cancer Handbook   Care Coordinator Card and after hours contact information    Time spent teaching with patient:  30 minutes    Veena VELEZ, RN  Care Coordinator  Gynecologic Cancer   Office:  [phone]  Pager: [phone] #[phone]

## 2017-04-20 NOTE — PROGRESS NOTES
Radiation Therapy Patient Education    Person involved with teaching: Patient,  and Son    Patient educational needs for self management of treatment-related side effects assessment completed.  [facility] Patient Ed tab contains Patient Learning Assessment    Education Materials Given  Radiation Therapy for GYN Patients    Educational Topics Discussed  Side effects expected, Pain management, Skin care, Nutrition and weight loss and When to call MD/RN    Response To Teaching  Verbalizes understanding    GYN Only  Vaginal Dilator-given and educated: not given    Referrals sent: [facility]    Chemotherapy?  Yes: Notified medical oncology of start date of May 2nd

## 2017-04-20 NOTE — NURSING NOTE
"Savannah Camacho is a 77 year old female who presents for:  Chief Complaint   Patient presents with     Oncology Clinic Visit     new patient consultation for chemo/radiation treatment related to cervical cancer         Initial Vitals:  /76  Pulse 72  Resp 16  Ht 1.626 m (5' 4\")  Wt 64.7 kg (142 lb 9.6 oz)  SpO2 98%  Breastfeeding? No  BMI 24.48 kg/m2 Estimated body mass index is 24.48 kg/(m^2) as calculated from the following:    Height as of this encounter: 1.626 m (5' 4\").    Weight as of this encounter: 64.7 kg (142 lb 9.6 oz).. Body surface area is 1.71 meters squared. BP completed using cuff size: regular  No Pain (0) No LMP recorded. Patient is postmenopausal. Allergies and medications reviewed.     Medications: Medication refills not needed today.  Pharmacy name entered into EPIC: Data Unavailable    Comments: patient denied pain/discomfort. Patient mentioned vaginal discharge and pinkish colored bleeding/spotting.     5 minutes for nursing intake (face to face time)   Akila Jenkins CMA        "

## 2017-04-20 NOTE — LETTER
2017       RE: Savannah Camacho  [address]   [place] [address]     Dear Colleague,    Thank you for referring your patient, Savannah Camacho, to the [facility]. Please see a copy of my visit note below.    Consult Notes on Referred Patient        Dr. Adán Lanier MD  [facility]  [address] [address] [place] [address]       RE: Savannah Camacho  : 1940  FEMI: 2017    Dear Dr. Adán Lanier:    I had the pleasure of seeing your patient Savannah Camacho here at the Gynecologic Cancer Clinic at the [facility] on 2017.  As you know she is a very pleasant 77 year old woman with a recent diagnosis of clinical stage IIA2, radiographic stage IIB.  Given these findings she was subsequently sent to the Gynecologic Cancer Clinic for new patient consultation.  She is accompanied today by her  and son.    Cancer Course:    She initially sought care from a gynecologist in 2012 at which time she was noted to have a plaque of her vagina which was biopsied and found to be hyperkeratosis.  She was thus treated with steroids and kenolog with substantial improvement in her symptoms.  She then had a pap smear with ASCUS, HPV negative in .  Most recently she has developed increasing, foul smelling vaginal discharge and again sought care from her gynecologist who rightfully took her to the OR for EUA and biopsies.  On EUA she was found to have a confluent area of lesions in the proximal vagina mostly confined to the anterior wall. This area measured approximately 2 x 3 cm and appeared to have a whitish appearance. There was extension into the right vaginal fornix, where there was an area of necrosis appreciated. Again, the cervix was not able to be visualized on exam. Biopsies were taken of the right vaginal wall and posterior vaginal wall. Both of these were positive for well-differentiated squamous cell carcinoma.     3/10/17:  Upper  vaginal biopsy:  Well differentiated squamous cell carcinoma    4/19/17:  MRI Pelvis:  Large exophytic heterogeneously enhancing with necrotic areas is seen arising from the upper vagina/fornix on the right side measuring 3.4 x 3.6 x 4 cm. Superiorly the lesion appears to involve the right lateral aspect of the cervix with extension into the parametrium. No extension into the cervical canal. No definite extension into the uterus. Posteriorly there is involvement of the posterior wall of the upper vagina with extension across the midline to the left side. The lesion also abuts the midportion of the rectum with possible involvement.  Thrombosis of a right parametrial venous structure is noted.  A prominent right internal iliac lymph node measures 5 mm in short axis.  Small amount of free fluid is noted in the pelvis. Visualized bowel loops otherwise appear unremarkable. Pelvic musculature is of normal appearance. No suspicious osseous lesions.    4/20/17:  PET/CT:  Results pending           Review of Systems:  Systemic           no weight changes; no fever; no chills; no night sweats; no appetite changes  Skin           no rashes, or lesions  Eye           no irritation; no changes in vision  Marlene-Laryngeal           no dysphagia; no hoarseness   Pulmonary    no cough; no shortness of breath  Cardiovascular    no chest pain; no palpitations  Gastrointestinal    no diarrhea; no constipation; no abdominal pain; no changes in bowel  habits; no blood in stool  Genitourinary   no urinary frequency; no urinary urgency; no dysuria; no pain; + abnormal vaginal discharge; + abnormal vaginal bleeding  Breast    no breast discharge; no breast changes; no breast pain  Musculoskeletal    no myalgias; no arthralgias; no back pain  Psychiatric           no depressed mood; no anxiety    Hematologic            no tender lymph nodes; no noticeable swellings or lumps   Endocrine    no hot flashes; no heat/cold intolerance          Neurological   no tremor; no numbness and tingling; no headaches; no difficulty sleeping    Obstetrics and Gynecology History:  ,  x 4  Menopause in early 40's, HRT for a couple years    Past Medical History:  Past Medical History:   Diagnosis Date     Asthma      Cervical cancer (H)      Congestive heart failure (CHF) (H)      Hyperlipidemia      Lung nodule     Long standing Rt apical lung nodule     MI (myocardial infarction) (H) 2009    involving anterior wall        Past Surgical History:  Past Surgical History:   Procedure Laterality Date     BIOPSY OF VAGINA,SIMPLE  2017    also bx from ,      COLONOSCOPY       HEART CATH STENT COR W/WO PTCA         Health Maintenance:  Last Pap Smear: See HPI    Last Mammogram:               Result: normal      She has not had a history of abnormal mammograms.    Last Colonoscopy:               Result: normal-repeat 10 years      Current Medications:   has a current medication list which includes the following prescription(s): montelukast sodium, lisinopril, simvastatin, carvedilol, multiple vitamins-minerals, aspirin, hydrochlorothiazide, and albuterol.     Allergies:   Amoxicillin      Social History:  Social History     Social History     Marital status:      Spouse name: N/A     Number of children: N/A     Years of education: N/A     Occupational History     Not on file.     Social History Main Topics     Smoking status: Never Smoker     Smokeless tobacco: Never Used     Alcohol use Yes     Drug use: No     Sexual activity: Not on file     Other Topics Concern     Not on file     Social History Narrative       Lives with , feels safe at home.  Retired from a dental office.  From [place].  Enjoys exercise, playing bridge, spending time with family and friends.  Does not have an advanced directive on file and would like her  to be her POA.  Would like full resuscitation if reversible cause is identified,  "however would not like to be kept on life sustaining measures long-term.     Family History:   The patient's family history is significant for.  Family History   Problem Relation Age of Onset     CANCER Other      Daughter had Breast Cancer at age 39         Physical Exam:   /76  Pulse 72  Resp 16  Ht 1.626 m (5' 4\")  Wt 64.7 kg (142 lb 9.6 oz)  SpO2 98%  Breastfeeding? No  BMI 24.48 kg/m2  Body mass index is 24.48 kg/(m^2).    General Appearance: healthy and alert, no distress     HEENT:  no thyromegaly, no palpable nodules or masses        Cardiovascular: regular rate and rhythm, no gallops, rubs or murmurs     Respiratory: lungs clear, no rales, rhonchi or wheezes, normal diaphragmatic excursion    Musculoskeletal: extremities non tender and without edema    Skin: no lesions or rashes     Neurological: normal gait, no gross defects     Psychiatric: appropriate mood and affect                               Hematological: normal cervical, supraclavicular and inguinal lymph nodes     Gastrointestinal:       abdomen soft, non-tender, non-distended, no organomegaly or masses    Genitourinary: External genitalia and urethral meatus appears normal.  The lower two thirds of the vagina is smooth and normal in appearance.  The upper vagina is almost completely replaced with a necrotic, foul smelling tumor.  It is difficult to appreciate a cervix as this tumor somewhat obliterates the vagina with what seems to be stenosis and scarring of the upper vagina.  There is not parametrial extension.  Recto-vaginal exam confirms these findings.      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     A total of 60 minutes was spent with the patient, >50% of which were spent in counseling the patient and/or treatment planning.      Plan:     1.)    stage IIA2, radiographic stage IIB squamous cell carcinoma of " the cervix.  We discussed that while most of her tumor is involving the vagina, this is classified as a cervical cancer as it does appear to abut the cervix on MRI.  We discussed the recommendation for definitive chemoradiation therapy given the size and location of her tumor.  We discussed the rationale for chemotherapy potentiation with cisplatin.  Side effects of chemotherapy were discussed.  She does have a PET scan pending and this will be followed up on to ensure no metastatic disease, however she did have a recent CT C/A/P in [place] that was negative for any metastatic disease.  We will plan to begin chemoradiation on 5/2/17.  She has already met with radiation oncology and has had her planning CT earlier today.  She has already had arrangements made to stay at the [facility] during her treatments.     2.) Genetic risk factors were assessed and the patient does not meet the qualifications for a referral.      3.) Labs and/or tests ordered include:  None.     4.) Health maintenance issues addressed today include pt is up to date.    5.) Chemotherapy teaching was completed today.        Thank you for allowing us to participate in the care of your patient.         Sincerely,    Janet Raines MD  Gynecologic Oncology  [facility]       NELLIE LEROY

## 2017-04-23 RX ORDER — MEPERIDINE HYDROCHLORIDE 25 MG/ML
25 INJECTION INTRAMUSCULAR; INTRAVENOUS; SUBCUTANEOUS EVERY 30 MIN PRN
Status: CANCELLED | OUTPATIENT
Start: 2017-06-13

## 2017-04-23 RX ORDER — LORAZEPAM 2 MG/ML
1 INJECTION INTRAMUSCULAR EVERY 6 HOURS PRN
Status: CANCELLED
Start: 2017-05-16

## 2017-04-23 RX ORDER — ALBUTEROL SULFATE 0.83 MG/ML
2.5 SOLUTION RESPIRATORY (INHALATION)
Status: CANCELLED | OUTPATIENT
Start: 2017-06-06

## 2017-04-23 RX ORDER — METHYLPREDNISOLONE SODIUM SUCCINATE 125 MG/2ML
125 INJECTION, POWDER, LYOPHILIZED, FOR SOLUTION INTRAMUSCULAR; INTRAVENOUS
Status: CANCELLED
Start: 2017-05-23

## 2017-04-23 RX ORDER — LORAZEPAM 2 MG/ML
1 INJECTION INTRAMUSCULAR EVERY 6 HOURS PRN
Status: CANCELLED
Start: 2017-06-13

## 2017-04-23 RX ORDER — LORAZEPAM 2 MG/ML
1 INJECTION INTRAMUSCULAR EVERY 6 HOURS PRN
Status: CANCELLED
Start: 2017-05-30

## 2017-04-23 RX ORDER — DEXTROSE, SODIUM CHLORIDE, AND POTASSIUM CHLORIDE 5; .45; .15 G/100ML; G/100ML; G/100ML
2000 INJECTION INTRAVENOUS ONCE
Status: CANCELLED
Start: 2017-05-30 | End: 2017-05-23

## 2017-04-23 RX ORDER — MEPERIDINE HYDROCHLORIDE 25 MG/ML
25 INJECTION INTRAMUSCULAR; INTRAVENOUS; SUBCUTANEOUS EVERY 30 MIN PRN
Status: CANCELLED | OUTPATIENT
Start: 2017-05-09

## 2017-04-23 RX ORDER — DEXTROSE, SODIUM CHLORIDE, AND POTASSIUM CHLORIDE 5; .45; .15 G/100ML; G/100ML; G/100ML
2000 INJECTION INTRAVENOUS ONCE
Status: CANCELLED
Start: 2017-06-13 | End: 2017-06-06

## 2017-04-23 RX ORDER — PALONOSETRON 0.05 MG/ML
0.25 INJECTION, SOLUTION INTRAVENOUS ONCE
Status: CANCELLED
Start: 2017-06-13

## 2017-04-23 RX ORDER — PALONOSETRON 0.05 MG/ML
0.25 INJECTION, SOLUTION INTRAVENOUS ONCE
Status: CANCELLED
Start: 2017-05-16

## 2017-04-23 RX ORDER — ALBUTEROL SULFATE 0.83 MG/ML
2.5 SOLUTION RESPIRATORY (INHALATION)
Status: CANCELLED | OUTPATIENT
Start: 2017-05-16

## 2017-04-23 RX ORDER — ALBUTEROL SULFATE 90 UG/1
1-2 AEROSOL, METERED RESPIRATORY (INHALATION)
Status: CANCELLED
Start: 2017-05-09

## 2017-04-23 RX ORDER — MEPERIDINE HYDROCHLORIDE 25 MG/ML
25 INJECTION INTRAMUSCULAR; INTRAVENOUS; SUBCUTANEOUS EVERY 30 MIN PRN
Status: CANCELLED | OUTPATIENT
Start: 2017-05-16

## 2017-04-23 RX ORDER — ALBUTEROL SULFATE 90 UG/1
1-2 AEROSOL, METERED RESPIRATORY (INHALATION)
Status: CANCELLED
Start: 2017-05-16

## 2017-04-23 RX ORDER — MEPERIDINE HYDROCHLORIDE 25 MG/ML
25 INJECTION INTRAMUSCULAR; INTRAVENOUS; SUBCUTANEOUS EVERY 30 MIN PRN
Status: CANCELLED | OUTPATIENT
Start: 2017-06-06

## 2017-04-23 RX ORDER — DEXTROSE, SODIUM CHLORIDE, AND POTASSIUM CHLORIDE 5; .45; .15 G/100ML; G/100ML; G/100ML
2000 INJECTION INTRAVENOUS ONCE
Status: CANCELLED
Start: 2017-05-23 | End: 2017-05-16

## 2017-04-23 RX ORDER — ALBUTEROL SULFATE 0.83 MG/ML
2.5 SOLUTION RESPIRATORY (INHALATION)
Status: CANCELLED | OUTPATIENT
Start: 2017-05-09

## 2017-04-23 RX ORDER — SODIUM CHLORIDE 9 MG/ML
1000 INJECTION, SOLUTION INTRAVENOUS CONTINUOUS PRN
Status: CANCELLED
Start: 2017-06-13

## 2017-04-23 RX ORDER — SODIUM CHLORIDE 9 MG/ML
1000 INJECTION, SOLUTION INTRAVENOUS CONTINUOUS PRN
Status: CANCELLED
Start: 2017-05-16

## 2017-04-23 RX ORDER — PALONOSETRON 0.05 MG/ML
0.25 INJECTION, SOLUTION INTRAVENOUS ONCE
Status: CANCELLED
Start: 2017-06-06

## 2017-04-23 RX ORDER — DIPHENHYDRAMINE HYDROCHLORIDE 50 MG/ML
50 INJECTION INTRAMUSCULAR; INTRAVENOUS
Status: CANCELLED
Start: 2017-05-09

## 2017-04-23 RX ORDER — EPINEPHRINE 0.3 MG/.3ML
0.3 INJECTION SUBCUTANEOUS EVERY 5 MIN PRN
Status: CANCELLED | OUTPATIENT
Start: 2017-05-23

## 2017-04-23 RX ORDER — PALONOSETRON 0.05 MG/ML
0.25 INJECTION, SOLUTION INTRAVENOUS ONCE
Status: CANCELLED
Start: 2017-05-30

## 2017-04-23 RX ORDER — ALBUTEROL SULFATE 90 UG/1
1-2 AEROSOL, METERED RESPIRATORY (INHALATION)
Status: CANCELLED
Start: 2017-06-13

## 2017-04-23 RX ORDER — DEXTROSE, SODIUM CHLORIDE, AND POTASSIUM CHLORIDE 5; .45; .15 G/100ML; G/100ML; G/100ML
2000 INJECTION INTRAVENOUS ONCE
Status: CANCELLED
Start: 2017-06-06 | End: 2017-05-30

## 2017-04-23 RX ORDER — METHYLPREDNISOLONE SODIUM SUCCINATE 125 MG/2ML
125 INJECTION, POWDER, LYOPHILIZED, FOR SOLUTION INTRAMUSCULAR; INTRAVENOUS
Status: CANCELLED
Start: 2017-06-06

## 2017-04-23 RX ORDER — PALONOSETRON 0.05 MG/ML
0.25 INJECTION, SOLUTION INTRAVENOUS ONCE
Status: CANCELLED
Start: 2017-05-09

## 2017-04-23 RX ORDER — SODIUM CHLORIDE 9 MG/ML
1000 INJECTION, SOLUTION INTRAVENOUS CONTINUOUS PRN
Status: CANCELLED
Start: 2017-05-09

## 2017-04-23 RX ORDER — METHYLPREDNISOLONE SODIUM SUCCINATE 125 MG/2ML
125 INJECTION, POWDER, LYOPHILIZED, FOR SOLUTION INTRAMUSCULAR; INTRAVENOUS
Status: CANCELLED
Start: 2017-05-16

## 2017-04-23 RX ORDER — DEXTROSE, SODIUM CHLORIDE, AND POTASSIUM CHLORIDE 5; .45; .15 G/100ML; G/100ML; G/100ML
2000 INJECTION INTRAVENOUS ONCE
Status: CANCELLED
Start: 2017-05-16 | End: 2017-05-09

## 2017-04-23 RX ORDER — DIPHENHYDRAMINE HYDROCHLORIDE 50 MG/ML
50 INJECTION INTRAMUSCULAR; INTRAVENOUS
Status: CANCELLED
Start: 2017-05-23

## 2017-04-23 RX ORDER — DIPHENHYDRAMINE HYDROCHLORIDE 50 MG/ML
50 INJECTION INTRAMUSCULAR; INTRAVENOUS
Status: CANCELLED
Start: 2017-05-16

## 2017-04-23 RX ORDER — DIPHENHYDRAMINE HYDROCHLORIDE 50 MG/ML
50 INJECTION INTRAMUSCULAR; INTRAVENOUS
Status: CANCELLED
Start: 2017-06-13

## 2017-04-23 RX ORDER — EPINEPHRINE 0.3 MG/.3ML
0.3 INJECTION SUBCUTANEOUS EVERY 5 MIN PRN
Status: CANCELLED | OUTPATIENT
Start: 2017-05-16

## 2017-04-23 RX ORDER — EPINEPHRINE 0.3 MG/.3ML
0.3 INJECTION SUBCUTANEOUS EVERY 5 MIN PRN
Status: CANCELLED | OUTPATIENT
Start: 2017-06-13

## 2017-04-23 RX ORDER — SODIUM CHLORIDE 9 MG/ML
1000 INJECTION, SOLUTION INTRAVENOUS CONTINUOUS PRN
Status: CANCELLED
Start: 2017-05-30

## 2017-04-23 RX ORDER — METHYLPREDNISOLONE SODIUM SUCCINATE 125 MG/2ML
125 INJECTION, POWDER, LYOPHILIZED, FOR SOLUTION INTRAMUSCULAR; INTRAVENOUS
Status: CANCELLED
Start: 2017-05-30

## 2017-04-23 RX ORDER — ALBUTEROL SULFATE 0.83 MG/ML
2.5 SOLUTION RESPIRATORY (INHALATION)
Status: CANCELLED | OUTPATIENT
Start: 2017-05-23

## 2017-04-23 RX ORDER — ALBUTEROL SULFATE 90 UG/1
1-2 AEROSOL, METERED RESPIRATORY (INHALATION)
Status: CANCELLED
Start: 2017-06-06

## 2017-04-23 RX ORDER — SODIUM CHLORIDE 9 MG/ML
1000 INJECTION, SOLUTION INTRAVENOUS CONTINUOUS PRN
Status: CANCELLED
Start: 2017-05-23

## 2017-04-23 RX ORDER — ALBUTEROL SULFATE 90 UG/1
1-2 AEROSOL, METERED RESPIRATORY (INHALATION)
Status: CANCELLED
Start: 2017-05-23

## 2017-04-23 RX ORDER — METHYLPREDNISOLONE SODIUM SUCCINATE 125 MG/2ML
125 INJECTION, POWDER, LYOPHILIZED, FOR SOLUTION INTRAMUSCULAR; INTRAVENOUS
Status: CANCELLED
Start: 2017-05-09

## 2017-04-23 RX ORDER — EPINEPHRINE 0.3 MG/.3ML
0.3 INJECTION SUBCUTANEOUS EVERY 5 MIN PRN
Status: CANCELLED | OUTPATIENT
Start: 2017-05-09

## 2017-04-23 RX ORDER — ALBUTEROL SULFATE 0.83 MG/ML
2.5 SOLUTION RESPIRATORY (INHALATION)
Status: CANCELLED | OUTPATIENT
Start: 2017-05-30

## 2017-04-23 RX ORDER — ALBUTEROL SULFATE 90 UG/1
1-2 AEROSOL, METERED RESPIRATORY (INHALATION)
Status: CANCELLED
Start: 2017-05-30

## 2017-04-23 RX ORDER — DIPHENHYDRAMINE HYDROCHLORIDE 50 MG/ML
50 INJECTION INTRAMUSCULAR; INTRAVENOUS
Status: CANCELLED
Start: 2017-05-30

## 2017-04-23 RX ORDER — LORAZEPAM 2 MG/ML
1 INJECTION INTRAMUSCULAR EVERY 6 HOURS PRN
Status: CANCELLED
Start: 2017-05-09

## 2017-04-23 RX ORDER — EPINEPHRINE 0.3 MG/.3ML
0.3 INJECTION SUBCUTANEOUS EVERY 5 MIN PRN
Status: CANCELLED | OUTPATIENT
Start: 2017-06-06

## 2017-04-23 RX ORDER — MEPERIDINE HYDROCHLORIDE 25 MG/ML
25 INJECTION INTRAMUSCULAR; INTRAVENOUS; SUBCUTANEOUS EVERY 30 MIN PRN
Status: CANCELLED | OUTPATIENT
Start: 2017-05-30

## 2017-04-23 RX ORDER — DEXTROSE, SODIUM CHLORIDE, AND POTASSIUM CHLORIDE 5; .45; .15 G/100ML; G/100ML; G/100ML
2000 INJECTION INTRAVENOUS ONCE
Status: CANCELLED
Start: 2017-05-09 | End: 2017-05-02

## 2017-04-23 RX ORDER — SODIUM CHLORIDE 9 MG/ML
1000 INJECTION, SOLUTION INTRAVENOUS CONTINUOUS PRN
Status: CANCELLED
Start: 2017-06-06

## 2017-04-23 RX ORDER — METHYLPREDNISOLONE SODIUM SUCCINATE 125 MG/2ML
125 INJECTION, POWDER, LYOPHILIZED, FOR SOLUTION INTRAMUSCULAR; INTRAVENOUS
Status: CANCELLED
Start: 2017-06-13

## 2017-04-23 RX ORDER — ALBUTEROL SULFATE 0.83 MG/ML
2.5 SOLUTION RESPIRATORY (INHALATION)
Status: CANCELLED | OUTPATIENT
Start: 2017-06-13

## 2017-04-23 RX ORDER — PALONOSETRON 0.05 MG/ML
0.25 INJECTION, SOLUTION INTRAVENOUS ONCE
Status: CANCELLED
Start: 2017-05-23

## 2017-04-23 RX ORDER — LORAZEPAM 2 MG/ML
1 INJECTION INTRAMUSCULAR EVERY 6 HOURS PRN
Status: CANCELLED
Start: 2017-05-23

## 2017-04-23 RX ORDER — MEPERIDINE HYDROCHLORIDE 25 MG/ML
25 INJECTION INTRAMUSCULAR; INTRAVENOUS; SUBCUTANEOUS EVERY 30 MIN PRN
Status: CANCELLED | OUTPATIENT
Start: 2017-05-23

## 2017-04-23 RX ORDER — LORAZEPAM 2 MG/ML
1 INJECTION INTRAMUSCULAR EVERY 6 HOURS PRN
Status: CANCELLED
Start: 2017-06-06

## 2017-04-23 RX ORDER — DIPHENHYDRAMINE HYDROCHLORIDE 50 MG/ML
50 INJECTION INTRAMUSCULAR; INTRAVENOUS
Status: CANCELLED
Start: 2017-06-06

## 2017-04-23 RX ORDER — EPINEPHRINE 0.3 MG/.3ML
0.3 INJECTION SUBCUTANEOUS EVERY 5 MIN PRN
Status: CANCELLED | OUTPATIENT
Start: 2017-05-30

## 2017-04-30 ENCOUNTER — APPOINTMENT (OUTPATIENT)
Dept: GENERAL RADIOLOGY | Facility: CLINIC | Age: 77
DRG: 641 | End: 2017-04-30
Attending: PHYSICIAN ASSISTANT
Payer: MEDICARE

## 2017-04-30 ENCOUNTER — HOSPITAL ENCOUNTER (INPATIENT)
Facility: CLINIC | Age: 77
LOS: 3 days | Discharge: HOME | DRG: 641 | End: 2017-05-03
Attending: EMERGENCY MEDICINE | Admitting: ORTHOPAEDIC SURGERY
Payer: MEDICARE

## 2017-04-30 DIAGNOSIS — R74.8 ELEVATED LIVER ENZYMES: ICD-10-CM

## 2017-04-30 DIAGNOSIS — C53.9 MALIGNANT NEOPLASM OF CERVIX, UNSPECIFIED SITE (H): ICD-10-CM

## 2017-04-30 DIAGNOSIS — E87.1 HYPONATREMIA: ICD-10-CM

## 2017-04-30 DIAGNOSIS — D64.9 ANEMIA, UNSPECIFIED TYPE: Primary | ICD-10-CM

## 2017-04-30 DIAGNOSIS — I10 ESSENTIAL HYPERTENSION, MALIGNANT: ICD-10-CM

## 2017-04-30 DIAGNOSIS — I25.2 OLD MYOCARDIAL INFARCTION: ICD-10-CM

## 2017-04-30 DIAGNOSIS — I50.9 HEART FAILURE, UNSPECIFIED (H): ICD-10-CM

## 2017-04-30 LAB
ALBUMIN SERPL-MCNC: 2.3 G/DL (ref 3.4–5)
ALBUMIN UR-MCNC: NEGATIVE MG/DL
ALP SERPL-CCNC: 287 U/L (ref 40–150)
ALT SERPL W P-5'-P-CCNC: 156 U/L (ref 0–50)
ANION GAP SERPL CALCULATED.3IONS-SCNC: 10 MMOL/L (ref 3–14)
APPEARANCE UR: CLEAR
AST SERPL W P-5'-P-CCNC: 178 U/L (ref 0–45)
BACTERIA #/AREA URNS HPF: ABNORMAL /HPF
BASOPHILS # BLD AUTO: 0 10E9/L (ref 0–0.2)
BASOPHILS NFR BLD AUTO: 0 %
BILIRUB SERPL-MCNC: 0.6 MG/DL (ref 0.2–1.3)
BILIRUB UR QL STRIP: NEGATIVE
BUN SERPL-MCNC: 25 MG/DL (ref 7–30)
CA-I BLD-SCNC: 4.1 MG/DL (ref 4.4–5.2)
CALCIUM SERPL-MCNC: 8.1 MG/DL (ref 8.5–10.1)
CHLORIDE SERPL-SCNC: 77 MMOL/L (ref 94–109)
CO2 BLDCOV-SCNC: 28 MMOL/L (ref 21–28)
CO2 SERPL-SCNC: 27 MMOL/L (ref 20–32)
COLOR UR AUTO: ABNORMAL
CREAT SERPL-MCNC: 1.29 MG/DL (ref 0.52–1.04)
CREAT UR-MCNC: 13 MG/DL
DIFFERENTIAL METHOD BLD: ABNORMAL
EOSINOPHIL # BLD AUTO: 0.1 10E9/L (ref 0–0.7)
EOSINOPHIL NFR BLD AUTO: 0.9 %
ERYTHROCYTE [DISTWIDTH] IN BLOOD BY AUTOMATED COUNT: 13.9 % (ref 10–15)
FRACT EXCRET NA UR+SERPL-RTO: 2 %
GFR SERPL CREATININE-BSD FRML MDRD: 40 ML/MIN/1.7M2
GLUCOSE BLD-MCNC: 120 MG/DL (ref 70–99)
GLUCOSE SERPL-MCNC: 114 MG/DL (ref 70–99)
GLUCOSE UR STRIP-MCNC: NEGATIVE MG/DL
HCT VFR BLD AUTO: 32.2 % (ref 35–47)
HCT VFR BLD CALC: 32 %PCV (ref 35–47)
HGB BLD CALC-MCNC: 10.9 G/DL (ref 11.7–15.7)
HGB BLD-MCNC: 11 G/DL (ref 11.7–15.7)
HGB UR QL STRIP: ABNORMAL
KETONES UR STRIP-MCNC: NEGATIVE MG/DL
LACTATE BLD-SCNC: 0.8 MMOL/L (ref 0.7–2.1)
LEUKOCYTE ESTERASE UR QL STRIP: ABNORMAL
LYMPHOCYTES # BLD AUTO: 1.3 10E9/L (ref 0.8–5.3)
LYMPHOCYTES NFR BLD AUTO: 7.9 %
MCH RBC QN AUTO: 29.5 PG (ref 26.5–33)
MCHC RBC AUTO-ENTMCNC: 34.2 G/DL (ref 31.5–36.5)
MCV RBC AUTO: 86 FL (ref 78–100)
MONOCYTES # BLD AUTO: 1.1 10E9/L (ref 0–1.3)
MONOCYTES NFR BLD AUTO: 7 %
NEUTROPHILS # BLD AUTO: 13.8 10E9/L (ref 1.6–8.3)
NEUTROPHILS NFR BLD AUTO: 84.2 %
NITRATE UR QL: NEGATIVE
NT-PROBNP SERPL-MCNC: 1791 PG/ML (ref 0–1800)
OSMOLALITY UR: 116 MMOL/KG (ref 100–1200)
PCO2 BLDV: 43 MM HG (ref 40–50)
PH BLDV: 7.42 PH (ref 7.32–7.43)
PH UR STRIP: 6 PH (ref 5–7)
PLATELET # BLD AUTO: 242 10E9/L (ref 150–450)
PO2 BLDV: 24 MM HG (ref 25–47)
POTASSIUM BLD-SCNC: 3.3 MMOL/L (ref 3.4–5.3)
POTASSIUM SERPL-SCNC: 2.9 MMOL/L (ref 3.4–5.3)
PROT SERPL-MCNC: 7.1 G/DL (ref 6.8–8.8)
RBC # BLD AUTO: 3.73 10E12/L (ref 3.8–5.2)
RBC #/AREA URNS AUTO: 4 /HPF (ref 0–2)
RBC MORPH BLD: NORMAL
SAO2 % BLDV FROM PO2: 44 %
SODIUM BLD-SCNC: 118 MMOL/L (ref 133–144)
SODIUM SERPL-SCNC: 114 MMOL/L (ref 133–144)
SODIUM SERPL-SCNC: 121 MMOL/L (ref 133–144)
SODIUM UR-SCNC: 22 MMOL/L
SP GR UR STRIP: 1 (ref 1–1.03)
SQUAMOUS #/AREA URNS AUTO: 1 /HPF (ref 0–1)
TSH SERPL DL<=0.05 MIU/L-ACNC: 1.38 MU/L (ref 0.4–4)
URN SPEC COLLECT METH UR: ABNORMAL
UROBILINOGEN UR STRIP-MCNC: NORMAL MG/DL (ref 0–2)
WBC # BLD AUTO: 16.4 10E9/L (ref 4–11)
WBC #/AREA URNS AUTO: 3 /HPF (ref 0–2)

## 2017-04-30 PROCEDURE — A9270 NON-COVERED ITEM OR SERVICE: HCPCS | Mod: GY | Performed by: EMERGENCY MEDICINE

## 2017-04-30 PROCEDURE — 40000501 ZZHCL STATISTIC HEMATOCRIT ED POCT

## 2017-04-30 PROCEDURE — 84295 ASSAY OF SERUM SODIUM: CPT | Performed by: PHYSICIAN ASSISTANT

## 2017-04-30 PROCEDURE — 96361 HYDRATE IV INFUSION ADD-ON: CPT | Performed by: EMERGENCY MEDICINE

## 2017-04-30 PROCEDURE — 81001 URINALYSIS AUTO W/SCOPE: CPT | Performed by: EMERGENCY MEDICINE

## 2017-04-30 PROCEDURE — 12000006 ZZH R&B IMCU INTERMEDIATE UMMC

## 2017-04-30 PROCEDURE — 25000132 ZZH RX MED GY IP 250 OP 250 PS 637: Mod: GY | Performed by: EMERGENCY MEDICINE

## 2017-04-30 PROCEDURE — 85025 COMPLETE CBC W/AUTO DIFF WBC: CPT | Performed by: EMERGENCY MEDICINE

## 2017-04-30 PROCEDURE — 84300 ASSAY OF URINE SODIUM: CPT | Performed by: PHYSICIAN ASSISTANT

## 2017-04-30 PROCEDURE — 93010 ELECTROCARDIOGRAM REPORT: CPT | Mod: Z6 | Performed by: EMERGENCY MEDICINE

## 2017-04-30 PROCEDURE — 40000502 ZZHCL STATISTIC GLUCOSE ED POCT

## 2017-04-30 PROCEDURE — 71020 XR CHEST 2 VW: CPT

## 2017-04-30 PROCEDURE — 99207 ZZC APP CREDIT; MD BILLING SHARED VISIT: CPT | Performed by: PHYSICIAN ASSISTANT

## 2017-04-30 PROCEDURE — 40000498 ZZHCL STATISTIC POTASSIUM ED POCT

## 2017-04-30 PROCEDURE — 99223 1ST HOSP IP/OBS HIGH 75: CPT | Mod: AI | Performed by: ORTHOPAEDIC SURGERY

## 2017-04-30 PROCEDURE — 82803 BLOOD GASES ANY COMBINATION: CPT

## 2017-04-30 PROCEDURE — 83605 ASSAY OF LACTIC ACID: CPT | Performed by: OBSTETRICS & GYNECOLOGY

## 2017-04-30 PROCEDURE — 93005 ELECTROCARDIOGRAM TRACING: CPT | Performed by: EMERGENCY MEDICINE

## 2017-04-30 PROCEDURE — 83935 ASSAY OF URINE OSMOLALITY: CPT | Performed by: EMERGENCY MEDICINE

## 2017-04-30 PROCEDURE — 84300 ASSAY OF URINE SODIUM: CPT | Performed by: EMERGENCY MEDICINE

## 2017-04-30 PROCEDURE — 80053 COMPREHEN METABOLIC PANEL: CPT | Performed by: EMERGENCY MEDICINE

## 2017-04-30 PROCEDURE — 99285 EMERGENCY DEPT VISIT HI MDM: CPT | Mod: 25 | Performed by: EMERGENCY MEDICINE

## 2017-04-30 PROCEDURE — 25000128 H RX IP 250 OP 636: Performed by: EMERGENCY MEDICINE

## 2017-04-30 PROCEDURE — 51798 US URINE CAPACITY MEASURE: CPT | Performed by: EMERGENCY MEDICINE

## 2017-04-30 PROCEDURE — 84443 ASSAY THYROID STIM HORMONE: CPT | Performed by: EMERGENCY MEDICINE

## 2017-04-30 PROCEDURE — 82330 ASSAY OF CALCIUM: CPT

## 2017-04-30 PROCEDURE — 87040 BLOOD CULTURE FOR BACTERIA: CPT | Performed by: OBSTETRICS & GYNECOLOGY

## 2017-04-30 PROCEDURE — 83880 ASSAY OF NATRIURETIC PEPTIDE: CPT | Performed by: EMERGENCY MEDICINE

## 2017-04-30 PROCEDURE — 96360 HYDRATION IV INFUSION INIT: CPT | Performed by: EMERGENCY MEDICINE

## 2017-04-30 PROCEDURE — 99291 CRITICAL CARE FIRST HOUR: CPT | Mod: 25 | Performed by: EMERGENCY MEDICINE

## 2017-04-30 PROCEDURE — 40000497 ZZHCL STATISTIC SODIUM ED POCT

## 2017-04-30 PROCEDURE — 82570 ASSAY OF URINE CREATININE: CPT | Performed by: EMERGENCY MEDICINE

## 2017-04-30 RX ORDER — POTASSIUM CHLORIDE 750 MG/1
40 TABLET, EXTENDED RELEASE ORAL ONCE
Status: COMPLETED | OUTPATIENT
Start: 2017-04-30 | End: 2017-04-30

## 2017-04-30 RX ORDER — SIMVASTATIN 40 MG
40 TABLET ORAL AT BEDTIME
Status: DISCONTINUED | OUTPATIENT
Start: 2017-05-01 | End: 2017-05-03 | Stop reason: HOSPADM

## 2017-04-30 RX ORDER — SODIUM CHLORIDE 9 MG/ML
INJECTION, SOLUTION INTRAVENOUS ONCE
Status: COMPLETED | OUTPATIENT
Start: 2017-04-30 | End: 2017-04-30

## 2017-04-30 RX ORDER — POLYETHYLENE GLYCOL 3350 17 G/17G
17 POWDER, FOR SOLUTION ORAL DAILY PRN
Status: DISCONTINUED | OUTPATIENT
Start: 2017-04-30 | End: 2017-05-03 | Stop reason: HOSPADM

## 2017-04-30 RX ORDER — POTASSIUM CHLORIDE 1.5 G/1.58G
20-40 POWDER, FOR SOLUTION ORAL
Status: DISCONTINUED | OUTPATIENT
Start: 2017-04-30 | End: 2017-05-03 | Stop reason: HOSPADM

## 2017-04-30 RX ORDER — AMOXICILLIN 250 MG
1-2 CAPSULE ORAL 2 TIMES DAILY PRN
Status: DISCONTINUED | OUTPATIENT
Start: 2017-04-30 | End: 2017-05-03 | Stop reason: HOSPADM

## 2017-04-30 RX ORDER — ONDANSETRON 2 MG/ML
4 INJECTION INTRAMUSCULAR; INTRAVENOUS EVERY 6 HOURS PRN
Status: DISCONTINUED | OUTPATIENT
Start: 2017-04-30 | End: 2017-05-03 | Stop reason: HOSPADM

## 2017-04-30 RX ORDER — NALOXONE HYDROCHLORIDE 0.4 MG/ML
.1-.4 INJECTION, SOLUTION INTRAMUSCULAR; INTRAVENOUS; SUBCUTANEOUS
Status: DISCONTINUED | OUTPATIENT
Start: 2017-04-30 | End: 2017-05-03 | Stop reason: HOSPADM

## 2017-04-30 RX ORDER — POTASSIUM CL/LIDO/0.9 % NACL 10MEQ/0.1L
10 INTRAVENOUS SOLUTION, PIGGYBACK (ML) INTRAVENOUS
Status: DISCONTINUED | OUTPATIENT
Start: 2017-04-30 | End: 2017-05-03 | Stop reason: HOSPADM

## 2017-04-30 RX ORDER — MONTELUKAST SODIUM 10 MG/1
10 TABLET ORAL AT BEDTIME
Status: DISCONTINUED | OUTPATIENT
Start: 2017-05-01 | End: 2017-05-03 | Stop reason: HOSPADM

## 2017-04-30 RX ORDER — ONDANSETRON 2 MG/ML
8 INJECTION INTRAMUSCULAR; INTRAVENOUS ONCE
Status: DISCONTINUED | OUTPATIENT
Start: 2017-04-30 | End: 2017-04-30 | Stop reason: CLARIF

## 2017-04-30 RX ORDER — POTASSIUM CHLORIDE 29.8 MG/ML
20 INJECTION INTRAVENOUS
Status: DISCONTINUED | OUTPATIENT
Start: 2017-04-30 | End: 2017-05-03 | Stop reason: HOSPADM

## 2017-04-30 RX ORDER — ASPIRIN 81 MG/1
81 TABLET, CHEWABLE ORAL DAILY
Status: DISCONTINUED | OUTPATIENT
Start: 2017-05-01 | End: 2017-05-03 | Stop reason: HOSPADM

## 2017-04-30 RX ORDER — ACETAMINOPHEN 325 MG/1
650 TABLET ORAL EVERY 4 HOURS PRN
Status: DISCONTINUED | OUTPATIENT
Start: 2017-04-30 | End: 2017-05-03 | Stop reason: HOSPADM

## 2017-04-30 RX ORDER — POTASSIUM CHLORIDE 750 MG/1
20-40 TABLET, EXTENDED RELEASE ORAL
Status: DISCONTINUED | OUTPATIENT
Start: 2017-04-30 | End: 2017-05-03 | Stop reason: HOSPADM

## 2017-04-30 RX ORDER — ALBUTEROL SULFATE 90 UG/1
2 AEROSOL, METERED RESPIRATORY (INHALATION) EVERY 6 HOURS
Status: DISCONTINUED | OUTPATIENT
Start: 2017-05-01 | End: 2017-05-01

## 2017-04-30 RX ORDER — CARVEDILOL 6.25 MG/1
6.25 TABLET ORAL 2 TIMES DAILY WITH MEALS
Status: DISCONTINUED | OUTPATIENT
Start: 2017-05-01 | End: 2017-05-03 | Stop reason: HOSPADM

## 2017-04-30 RX ORDER — ONDANSETRON 4 MG/1
4 TABLET, ORALLY DISINTEGRATING ORAL EVERY 6 HOURS PRN
Status: DISCONTINUED | OUTPATIENT
Start: 2017-04-30 | End: 2017-05-03 | Stop reason: HOSPADM

## 2017-04-30 RX ORDER — POTASSIUM CHLORIDE 7.45 MG/ML
10 INJECTION INTRAVENOUS
Status: DISCONTINUED | OUTPATIENT
Start: 2017-04-30 | End: 2017-05-03 | Stop reason: HOSPADM

## 2017-04-30 RX ORDER — LIDOCAINE 40 MG/G
CREAM TOPICAL
Status: DISCONTINUED | OUTPATIENT
Start: 2017-04-30 | End: 2017-05-03 | Stop reason: HOSPADM

## 2017-04-30 RX ADMIN — POTASSIUM CHLORIDE 40 MEQ: 750 TABLET, EXTENDED RELEASE ORAL at 19:40

## 2017-04-30 RX ADMIN — SODIUM CHLORIDE: 9 INJECTION, SOLUTION INTRAVENOUS at 19:39

## 2017-04-30 RX ADMIN — SODIUM CHLORIDE 1000 ML: 9 INJECTION, SOLUTION INTRAVENOUS at 18:49

## 2017-04-30 ASSESSMENT — ENCOUNTER SYMPTOMS
LIGHT-HEADEDNESS: 1
DYSURIA: 1
FEVER: 0
SHORTNESS OF BREATH: 0
NAUSEA: 1
DIFFICULTY URINATING: 1
HEADACHES: 1
WEAKNESS: 0
NUMBNESS: 0
VOMITING: 0
ABDOMINAL PAIN: 0
DIARRHEA: 0

## 2017-04-30 NOTE — LETTER
Transition Communication Hand-off for Care Transitions to Next Level of Care Provider    Name: Savannah Camacho  MRN #: [id number]  Primary Care Provider: Adán Lanier     Primary Clinic: [facility] [place] [address] [place] [address]     Reason for Hospitalization:  Hyponatremia [E87.1]  Malignant neoplasm of cervix, unspecified site (H) [C53.9]  Admit Date/Time: 4/30/2017  6:12 PM  Discharge Date: 5/3/17  Payor Source: Payor: MEDICA / Plan: MEDICA PRIME SOLUTION / Product Type: Indemnity /     Reason for Communication Hand-off Referral: Multiple providers/specialties    Discharge Plan: discharge to [facility] while receiving chemo and radiation       Concern for non-adherence with plan of care:   Y/N no  Already enrolled in Tele-monitoring program and name of program:  no  Follow-up specialty is recommended: Yes    Follow-up plan:  Future Appointments  Date Time Provider Department Center   5/4/2017 2:30 PM UMP RAD ONC VARIAN [facility]   5/4/2017 2:45 PM Michelle Carrion MD [facility]   5/5/2017 2:30 PM UMP RAD ONC VARIAN [facility]   5/5/2017 3:00 PM  LAB [facility]   5/5/2017 3:20 PM Kerry Hurtado APRN CNP [facility]   5/8/2017 2:30 PM UMP RAD ONC VARIAN [facility]   5/9/2017 6:30 AM  MASONIC LAB DRAW [facility]   5/9/2017 7:00 AM UC ONCOLOGY INFUSION U[facility]   5/9/2017 2:30 PM UMP RAD ONC VARIAN [facility]   5/10/2017 2:30 PM UMP RAD ONC VARIAN [facility]   5/11/2017 2:30 PM UMP RAD ONC VARIAN [facility]   5/11/2017 2:45 PM Michelle Carrion MD [facility]   5/12/2017 2:30 PM UMP RAD ONC VARIAN [facility]   5/15/2017 2:30 PM UMP RAD ONC VARIAN [facility]   5/16/2017 6:30 AM  MASONIC LAB DRAW [facility]   5/16/2017 7:00 AM UC ONCOLOGY INFUSION ONA [facility]   5/16/2017 2:30 PM UMP RAD ONC VARIAN [facility]   5/17/2017 2:30 PM UMP RAD ONC VARIAN [facility] P [facility]   5/17/2017 2:45 PM Murali  MD Michelle [facility]ON [facility]   5/18/2017 2:30 PM UMP RAD ONC VARIAN [facility]   5/19/2017 2:30 PM UMP RAD ONC VARIAN [facility]   5/22/2017 2:30 PM UMP RAD ONC VARIAN [facility]   5/23/2017 6:30 AM  MASONIC LAB DRAW [facility]   5/23/2017 7:00 AM UC ONCOLOGY INFUSION [facility]   5/23/2017 2:30 PM UMP RAD ONC VARIAN [facility]   5/24/2017 2:30 PM UMP RAD ONC VARIAN [facility]   5/25/2017 2:30 PM UMP RAD ONC VARIAN [facility]   5/25/2017 2:45 PM Michelle Carrion MD [facility]   5/26/2017 2:30 PM UMP RAD ONC VARIAN [facility]   5/30/2017 6:30 AM  MASONIC LAB DRAW [facility]   5/30/2017 7:00 AM UC ONCOLOGY INFUSION [facility]   5/30/2017 2:30 PM UMP RAD ONC VARIAN [facility]   5/30/2017 3:00 PM [address]   5/31/2017 2:30 PM UMP RAD ONC VARIAN [facility]   6/1/2017 2:30 PM UMP RAD ONC VARIAN [facility]   6/1/2017 2:45 PM Michelle Carrion MD [facility]   6/2/2017 2:30 PM UMP RAD ONC VARIAN [facility]   6/5/2017 6:30 AM  MASONIC LAB DRAW [facility]   6/5/2017 7:00 AM UC ONCOLOGY INFUSION [facility]   6/5/2017 2:30 PM UMP RAD ONC VARIAN [facility]   6/13/2017 7:55 AM Nicole Ward MD [facility]   6/13/2017 11:00 AM Nicole Ward MD [facility]   6/13/2017 3:00 PM Nicole Ward MD [facility]   6/14/2017 8:00 AM Michelle Carrion MD [facility]   6/14/2017 2:30 PM Michelle Carrion MD [facility]   6/15/2017 8:00 AM Michelle Carrion MD [facility]   6/15/2017 2:30 PM Michelle Carrion MD [facility]   6/15/2017 3:55 PM Michelle Carrion MD [facility]       Key Recommendations:  See AVS    Cece Valdez RN, BSN  Care Coordinator [facility]  Pager: [phone]  Phone: [phone]    AVS/Discharge Summary is the source of truth; this is a helpful guide for improved communication of patient story

## 2017-04-30 NOTE — IP AVS SNAPSHOT
MRN:[id number]                      After Visit Summary   4/30/2017    Savannah Camacho    MRN: [id number]           Thank you!     Thank you for choosing [facility] for your care. Our goal is always to provide you with excellent care. Hearing back from our patients is one way we can continue to improve our services. Please take a few minutes to complete the written survey that you may receive in the mail after you visit with us. Thank you!        Patient Information     Date Of Birth          1940        Designated Caregiver       Most Recent Value    Caregiver    Will someone help with your care after discharge? yes    Name of designated caregiver Reynold Camacho ()    Phone number of caregiver [phone]    Caregiver address [address] [place] [id number]      About your hospital stay     You were admitted on:  April 30, 2017 You last received care in the:  [address] [facility]    You were discharged on:  May 3, 2017        Reason for your hospital stay       You were hospitalized for low sodium.                  Who to Call     For medical emergencies, please call 911.  For non-urgent questions about your medical care, please call your primary care provider or clinic, [phone]          Attending Provider     Provider Specialty    Birgit Colvin MD Emergency Medicine    [facility], Anant Obrien MD Pediatrics    [facility]    Lazaro Gold MD Internal Medicine       Primary Care Provider Office Phone # Fax #    Adán Lanier [phone]       [address] [address]         When to contact your care team       Call your PCP or return to ED for temperatures > 100.7 degrees, worsening or changing pain, uncontrolled vomiting or inability to tolerate oral intake, worsening headache, new or worsening diarrhea, new or worsening shortness of breath, decreased urine output,  yellowing of the eyes or skin, confusion, weakness, blood in urine or stools.                  After Care Instructions     Activity       Your activity upon discharge: activity as tolerated            Diet       Follow this diet upon discharge: Regular Diet                  Follow-up Appointments     Adult [facility] Follow-up and recommended labs and tests       Daily Radiation    5/5/17 Follow up in the Gyn Onc clinic with Kerry Hurtado CNP. Call prior to then for questions or concerns [phone]    Appointments on [place] and/or [facility] (with [facility] or [facility] provider or service). Call [phone] if you haven't heard regarding these appointments within 7 days of discharge.            Follow Up and recommended labs and tests       Recommend repeat CMP and CBC prior to Oncology appt on 5/5/17.  Recommend BP monitoring since we have discontinued Lisinopril and HCTZ.                  Your next 10 appointments already scheduled     May 04, 2017  2:30 PM CDT   EXTERNAL RADIATION TREATMENT with [facility] RAD ONC VARIAN   Radiation Oncology Clinic ([facility])    [facility]  1st Floor  [address] [facility] [id number] [phone]            May 04, 2017  2:45 PM CDT   ON TREATMENT VISIT with Michelle Carrion MD   Radiation Oncology Clinic ([facility])    [facility]  1st Floor  [address] [facility] [id number] [phone]            May 05, 2017  2:30 PM CDT   EXTERNAL RADIATION TREATMENT with [facility] RAD ONC VARIAN   Radiation Oncology Clinic ([facility])    [facility]  1st Floor  [address] [facility] [id number] [phone]            May 05, 2017  3:00 PM CDT   LAB with  LAB    Health Lab ([facility])    [address] [facility]  1st [facility] [id number] [phone]           Patient must bring picture ID.  Patient should be prepared  to give a urine specimen  Please do not eat 10-12 hours before your appointment if you are coming in fasting for labs on lipids, cholesterol, or glucose (sugar).  Pregnant women should follow their Care Team instructions. Water with medications is okay. Do not drink coffee or other fluids.   If you have concerns about taking  your medications, please ask at office or if scheduling via [email or web address], send a message by clicking on Secure Messaging, Message Your Care Team.            May 05, 2017  3:20 PM CDT   (Arrive by 3:05 PM)   Return Active Treatment with CASPER Arias CNP   [facility] ([facility])    [address] [facility]  [address]nd [facility] [address]   [phone]            May 08, 2017  2:30 PM CDT   EXTERNAL RADIATION TREATMENT with [facility] RAD ONC VARIAN   Radiation Oncology Clinic ([facility])    [facility]  1st Floor  [address] [facility] [address]   [phone]            May 09, 2017  6:30 AM CDT   [facility] Lab Draw with  [facility] DRAW   [facility] Lab Draw ([facility])    [address] [address]   [phone]            May 09, 2017  7:00 AM CDT   Infusion 360 with [facility] ONCOLOGY INFUSION, UC 10 ATC   [facility] ([facility])    [address] [address]   [phone]            May 09, 2017  2:30 PM CDT   EXTERNAL RADIATION TREATMENT with [facility] RAD ONC VARIAN   Radiation Oncology Clinic ([facility])    [facility]  1st Floor  [address] [facility] [address]   [phone]              Future tests that were ordered for you     CBC with platelets       Last Lab Result: Hemoglobin (g/dL)       Date                     Value                 05/03/2017               10.2 (L)         ----------            Comprehensive  "metabolic panel                 General Recommendations To Control Heart Failure When You Get Home     Instructions To Patients and Families: Please read and check off each of these important instructions as you do them when you get home.           Weight and symptoms      ___ Put a scale in your bathroom  ___ Post a weight chart or calendar next to the scale  ___Weigh yourself every day as soon as you you get up in the morning. You should only be wearing your pajamas. Write your weight on the chart/calendar.  ___ Bring your weight chart/calendar with you to all appointments    ___Call your doctor if you gain 2 pounds in 1 day or 5 pounds in 1 week from your \"dry\" weight (baseline weight). Also call your doctor if you have shortness of breath that gets worse over time, leg swelling or fatigue.         Medicines and diet     ___ Make sure to take your medicines as prescribed.    ___Bring a current list of your medicines and all of your medicine bottles with you to all appointments.    ___ Limit fluids if you still have swelling or shortness of breath, or if your doctor tells you to do so.  ___ Eat less than 2000 mg of sodium (salt) every day. Read food labels, and do not add salt to meals.   ___ Heart healthy diet with low fat and low cholesterol          Activity and suggested lifestyle changes    ___ Stay active. Talk to your doctor about an exercise program that is safe for your heart.    ___ Stop smoking. Reduce alcohol use.      ___ Lose weight if you are overweight. Extra weight puts a lot of stress on the heart.          Control for Leg Swelling   ___ Keep your legs elevated (raised) as needed for swelling. If swelling is uncomfortable or elevation doesn t help, ask your doctor about using ACE wrap or Jobst stockings.          Follow-up appointments   ___ Make a [facility] Clinic appointment with a basic metabolic panel lab draw 3 to 5 days after you leave the hospital. Call one of the following locations: " "  [facility]  [phone],  [facility] [phone],  [facility]  [phone].     ___ Make sure to take your medications as prescribed and bring an accurate list of your medications and your weight chart/calendar to your follow up appointment at the [facility].R.E. Clinic for continued education and adjustments          What is the CORE clinic?    The C.O.R.E (Cardiomyopathy, Optimization, Rehabilitation, Education) Clinic is a heart failure specialty clinic within the [facility]. At C.O.R.E., you will work with nurse practitioners to carefully adjust medicines, get education and learn who and when to call if symptoms appear. C.O.R.E nurses specialize in helping you:    better understand your disease.    slow the progress of your disease.    improve the length and quality of your life.    detect future heart problems before they become life threatening.    avoid hospital stays.            Pending Results     Date and Time Order Name Status Description    4/30/2017 2201 Blood culture Preliminary             Statement of Approval     Ordered          05/03/17 1526  I have reviewed and agree with all the recommendations and orders detailed in this document.  EFFECTIVE NOW     Approved and electronically signed by:  Godfrey Denson PA-C             Admission Information     Date & Time Provider Department Dept. Phone    4/30/2017 Lazaro Gold MD [address] [facility] [phone]      Your Vitals Were     Blood Pressure Temperature Respirations Height Weight Pulse Oximetry    132/71 (BP Location: Left arm) 97.7  F (36.5  C) (Axillary) 18 1.626 m (5' 4\") 67.4 kg (148 lb 9.6 oz) 98%    BMI (Body Mass Index)                   25.51 kg/m2           MyChart Information     The [employer] [facility] lets you send messages to your doctor, view your test results, renew your prescriptions, schedule appointments and " "more. To sign up, go to [email or web address] . Click on \"Log in\" on the left side of the screen, which will take you to the Welcome page. Then click on \"Sign up Now\" on the right side of the page.     You will be asked to enter the access code listed below, as well as some personal information. Please follow the directions to create your username and password.     Your access code is: [email or web address]  Expires: 2017 11:20 AM     Your access code will  in 90 days. If you need help or a new code, please call your [facility] clinic or [phone].        Care EveryWhere ID     This is your Care EveryWhere ID. This could be used by other organizations to access your [facility] medical records  [id number]           Review of your medicines      START taking        Dose / Directions    LORazepam 1 MG tablet   Commonly known as:  ATIVAN   Used for:  Malignant neoplasm of exocervix (H)        Dose:  1 mg   Take 1 tablet (1 mg) by mouth every 6 hours as needed (nausea/vomiting, anxiety or sleep )   Quantity:  30 tablet   Refills:  1       prochlorperazine 10 MG tablet   Commonly known as:  COMPAZINE   Used for:  Malignant neoplasm of exocervix (H)        Dose:  10 mg   Take 1 tablet (10 mg) by mouth every 6 hours as needed (nausea/vomiting)   Quantity:  30 tablet   Refills:  2         CONTINUE these medicines which have NOT CHANGED        Dose / Directions    albuterol 108 (90 BASE) MCG/ACT Inhaler   Commonly known as:  PROAIR HFA/PROVENTIL HFA/VENTOLIN HFA        Dose:  2 puff   Inhale 2 puffs into the lungs every 6 hours Reported on 2017   Refills:  0       ASPIRIN PO        Dose:  81 mg   Take 81 mg by mouth daily   Refills:  0       COREG PO        Dose:  6.25 mg   Take 6.25 mg by mouth 2 times daily (with meals)   Refills:  0       MULTIVITAMIN ADULT PO        Take by mouth daily   Refills:  0       SIMVASTATIN PO        Dose:  40 mg   Take 40 mg by mouth once   Refills:  0       SINGULAIR PO        Dose:  " 10 mg   Take 10 mg by mouth once   Refills:  0         STOP taking     HYDROCHLOROTHIAZIDE PO           LISINOPRIL PO                Where to get your medicines      These medications were sent to [place], [place] - [address] [facility] [address]  [address] [facility] [address], [facility] [address]    Hours:  TRANSPLANT PHONE NUMBER [phone] Phone:  [phone]     prochlorperazine 10 MG tablet         Some of these will need a paper prescription and others can be bought over the counter. Ask your nurse if you have questions.     Bring a paper prescription for each of these medications     LORazepam 1 MG tablet                Protect others around you: Learn how to safely use, store and throw away your medicines at www.disposemymeds.org.             Medication List: This is a list of all your medications and when to take them. Check marks below indicate your daily home schedule. Keep this list as a reference.      Medications           Morning Afternoon Evening Bedtime As Needed    albuterol 108 (90 BASE) MCG/ACT Inhaler   Commonly known as:  PROAIR HFA/PROVENTIL HFA/VENTOLIN HFA   Inhale 2 puffs into the lungs every 6 hours Reported on 4/20/2017                                ASPIRIN PO   Take 81 mg by mouth daily   Last time this was given:  81 mg on 5/2/2017  7:50 PM                                COREG PO   Take 6.25 mg by mouth 2 times daily (with meals)   Last time this was given:  6.25 mg on 5/3/2017  8:06 AM                                LORazepam 1 MG tablet   Commonly known as:  ATIVAN   Take 1 tablet (1 mg) by mouth every 6 hours as needed (nausea/vomiting, anxiety or sleep )                                MULTIVITAMIN ADULT PO   Take by mouth daily                                prochlorperazine 10 MG tablet   Commonly known as:  COMPAZINE   Take 1 tablet (10 mg) by mouth every 6 hours as needed (nausea/vomiting)                                SIMVASTATIN PO   Take 40  mg by mouth once   Last time this was given:  40 mg on 5/2/2017  9:48 PM                                SINGULAIR PO   Take 10 mg by mouth once   Last time this was given:  10 mg on 5/2/2017  9:47 PM

## 2017-04-30 NOTE — ED NOTES
Dizziness. Started 3 days ago, worse today. Decreased urine output today and dry mouth. States she is starting radiation for cervical cancer here tomorrow.

## 2017-04-30 NOTE — ED PROVIDER NOTES
"  History     Chief Complaint   Patient presents with     Dizziness     HPI  Savannah Camacho is a 77 year old female with a history of CHF, MI and cervical cancer starting chemotherapy and radiation tomorrow who presents to the Emergency Department with her  for evaluation of lightheadedness. For the past couple of days, the patient has been experiencing lightheadedness with standing which became significantly worse today feeling unsteady on her feet. Associated symptoms include intermittent nausea as well as dysuria and a headache, taking Tylenol without improvement. The headache has been present for a couple of days and she rates it as \"tolerable\". Additionally, the patient has not been able to urinate since this morning around 3:00 AM (~15 hours ago). She denies abdominal pain, changes in vision, numbness, tingling, weakness, fever, chest pain, shortness of breat, vomiting, diarrhea or any other concerns or complaints at this time. She does not feel the urge to urinate or bladder distension.  She has been drinking fluids today because of the lightheadedness. She and her  drove here from [place], [place] today in order to start the radiation therapy tomorrow.        Past Medical History:   Diagnosis Date     Asthma      Cervical cancer (H)      Congestive heart failure (CHF) (H)      Hyperlipidemia      Hypertension      Lung nodule     Long standing Rt apical lung nodule     MI (myocardial infarction) (H) 2009    involving anterior wall        Past Surgical History:   Procedure Laterality Date     BIOPSY OF VAGINA,SIMPLE  02/2017    also bx from 2012,      COLONOSCOPY       HEART CATH STENT COR W/WO PTCA  2009       Family History   Problem Relation Age of Onset     CANCER Other      Daughter had Breast Cancer at age 39       Social History   Substance Use Topics     Smoking status: Never Smoker     Smokeless tobacco: Never Used     Alcohol use Yes       Current Facility-Administered " "Medications   Medication     albuterol (PROAIR HFA/PROVENTIL HFA/VENTOLIN HFA) Inhaler 2 puff     aspirin chewable tablet 81 mg     carvedilol (COREG) tablet 6.25 mg     montelukast (SINGULAIR) tablet 10 mg     simvastatin (ZOCOR) tablet 40 mg     naloxone (NARCAN) injection 0.1-0.4 mg     lidocaine 1 % 1 mL     lidocaine (LMX4) kit     sodium chloride (PF) 0.9% PF flush 3 mL     sodium chloride (PF) 0.9% PF flush 3 mL     enoxaparin (LOVENOX) injection 40 mg     potassium chloride SA (K-DUR/KLOR-CON M) CR tablet 20-40 mEq     potassium chloride (KLOR-CON) Packet 20-40 mEq     potassium chloride 10 mEq in 100 mL intermittent infusion     potassium chloride 10 mEq in 100 mL intermittent infusion with 10 mg lidocaine     potassium chloride 20 mEq in 50 mL intermittent infusion     acetaminophen (TYLENOL) tablet 650 mg     senna-docusate (SENOKOT-S;PERICOLACE) 8.6-50 MG per tablet 1-2 tablet     polyethylene glycol (MIRALAX/GLYCOLAX) Packet 17 g     ondansetron (ZOFRAN-ODT) ODT tab 4 mg    Or     ondansetron (ZOFRAN) injection 4 mg     D5W 1,000 mL infusion        Allergies   Allergen Reactions     Amoxicillin Rash     I have reviewed the Medications, Allergies, Past Medical and Surgical History, and Social History in the Epic system.    Review of Systems   Constitutional: Negative for fever.   Eyes: Negative for visual disturbance.   Respiratory: Negative for shortness of breath.    Cardiovascular: Negative for chest pain.   Gastrointestinal: Positive for nausea. Negative for abdominal pain, diarrhea and vomiting.   Genitourinary: Positive for difficulty urinating and dysuria.   Neurological: Positive for light-headedness and headaches. Negative for weakness and numbness.        Negative for tingling.   All other systems reviewed and are negative.      Physical Exam   BP: 154/75  Heart Rate: 74  Temp: 97.8  F (36.6  C)  Resp: 18  Height: 162.6 cm (5' 4\")  SpO2: 99 %  Physical Exam      GEN:  Well developed, no acute " distress, but appears slightly flushed  HEENT:  PERRL, EOMI, Mucous membranes are moist.   Cardio:  RRR, no murmur, radial pulses equal bilaterally  PULM:  Lungs clear, good air movement, no wheezes, rales  Abd:  Soft, normal bowel sounds, no focal tenderness  Musculoskeletal:  normal range of motion, no lower extremity swelling or calf tenderness  Neuro:  Alert and oriented X3, Follows commands, CN exam is normal, finger to nose is normal, sensation and strength is normal throughout, no pronator drift   Skin:  Warm, dry    ED Course     6:14 PM  The patient was seen and examined by Dr. Colvin in Room 3.     ED Course     Procedures             EKG Interpretation:      Interpreted by Birgit Colvin  Time reviewed: 21:40  Symptoms at time of EKG: lightheadedness   Rhythm: sinus with premature supraventricular complexes  Rate: normal  Axis: normal  Ectopy: none  Conduction: normal  ST Segments/ T Waves: No ST-T wave changes  Q Waves: none  Comparison to prior: No old EKG available    Clinical Impression: sinus rhythm with premature supraventricular complexes and possible LAE      Critical Care time:  was 30 minutes for this patient excluding procedures.   Care Everywhere records were reviewed to compare today's labs with prevoius.  Labs are normal except as shown. The hyponatremia is new compared to sodium from last month.   Sodium is markedly decreased.  Blood draw was done a second time to recheck and it is very low. She was treated with IV NS initially because of her symptoms of lightheadedness.  She was also given po potassium replacement.  Bladder scan was done after the tried to void and the bladder scan revealed 290 cc in the bladder.  She tried to void again and voided 500 cc of urine. She reports that she drank 5-7 bottles of water today because of the lightheadedness and lack of urination.    Patient was discussed with Gyn/Onc as she is being treated for cervical cancer.  They prefer that she be  admitted to medicine given the very low sodium levels and need for further workup of this and careful replacement.     Labs Ordered and Resulted from Time of ED Arrival Up to the Time of Departure from the ED   CBC WITH PLATELETS DIFFERENTIAL - Abnormal; Notable for the following:        Result Value    WBC 16.4 (*)     RBC Count 3.73 (*)     Hemoglobin 11.0 (*)     Hematocrit 32.2 (*)     Absolute Neutrophil 13.8 (*)     All other components within normal limits   COMPREHENSIVE METABOLIC PANEL - Abnormal; Notable for the following:     Sodium 114 (*)     Potassium 2.9 (*)     Chloride 77 (*)     Glucose 114 (*)     Creatinine 1.29 (*)     GFR Estimate 40 (*)     GFR Estimate If Black 49 (*)     Calcium 8.1 (*)     Albumin 2.3 (*)     Alkaline Phosphatase 287 (*)      (*)      (*)     All other components within normal limits   ROUTINE UA WITH MICROSCOPIC REFLEX TO CULTURE - Abnormal; Notable for the following:     Specific Gravity Urine 1.002 (*)     Blood Urine Trace (*)     Leukocyte Esterase Urine Small (*)     WBC Urine 3 (*)     RBC Urine 4 (*)     Bacteria Urine Few (*)     All other components within normal limits   ISTAT GASES ELEC ICA GLUC JEFERSON POCT - Abnormal; Notable for the following:     PO2 Venous 24 (*)     Sodium 118 (*)     Potassium 3.3 (*)     Glucose 120 (*)     Calcium Ionized 4.1 (*)     Hemoglobin 10.9 (*)     Hematocrit - POCT 32 (*)     All other components within normal limits   NT PROBNP INPATIENT   TSH   BLADDER SCAN   ISTAT CG8 GAS ELEC ICA GLUC JEFERSON NURSE POCT   CENTRAL VENOUS CATHETER   IP ASSIGN PROVIDER TEAM TO TREATMENT TEAM       Consults  Obstetrics/Gynecology: Called    Assessments & Plan (with Medical Decision Making)   Lightneadness and nausea in a patient with known cervical ca.  She also had not been able to void for several hours.  It is unclear what is causing the acute hyponatremia.  This will be worked up further.  She will be admitted to medicine for [facility]  workup and treatment.  She does have a cardiac history but does not have symptoms today of CHF or ACS.      I have reviewed the nursing notes.    I have reviewed the findings, diagnosis, plan and need for follow up with the patient.    Current Discharge Medication List          Final diagnoses:   Hyponatremia   Malignant neoplasm of cervix, unspecified site (H)     IElva, am serving as a trained medical scribe to document services personally performed by Birgit Colvin MD, based on the provider's statements to me.      Birgit NIELSON MD, was physically present and have reviewed and verified the accuracy of this note documented by Elva Cueva.     4/30/2017   [facility], [place], EMERGENCY DEPARTMENT     Birgit Colvin MD  05/01/17 0043

## 2017-04-30 NOTE — IP AVS SNAPSHOT
[address] [address]    Phone:  [phone]                                       After Visit Summary   4/30/2017    Savannah Camacho    MRN: [id number]           After Visit Summary Signature Page     I have received my discharge instructions, and my questions have been answered. I have discussed any challenges I see with this plan with the nurse or doctor.    ..........................................................................................................................................  Patient/Patient Representative Signature      ..........................................................................................................................................  Patient Representative Print Name and Relationship to Patient    ..................................................               ................................................  Date                                            Time    ..........................................................................................................................................  Reviewed by Signature/Title    ...................................................              ..............................................  Date                                                            Time

## 2017-05-01 ENCOUNTER — APPOINTMENT (OUTPATIENT)
Dept: ULTRASOUND IMAGING | Facility: CLINIC | Age: 77
DRG: 641 | End: 2017-05-01
Attending: PHYSICIAN ASSISTANT
Payer: MEDICARE

## 2017-05-01 ENCOUNTER — APPOINTMENT (OUTPATIENT)
Dept: RADIATION ONCOLOGY | Facility: CLINIC | Age: 77
End: 2017-05-01
Attending: RADIOLOGY
Payer: MEDICARE

## 2017-05-01 LAB
ALBUMIN SERPL-MCNC: 1.9 G/DL (ref 3.4–5)
ALP SERPL-CCNC: 258 U/L (ref 40–150)
ALT SERPL W P-5'-P-CCNC: 126 U/L (ref 0–50)
ANION GAP SERPL CALCULATED.3IONS-SCNC: 9 MMOL/L (ref 3–14)
AST SERPL W P-5'-P-CCNC: 131 U/L (ref 0–45)
BILIRUB SERPL-MCNC: 0.5 MG/DL (ref 0.2–1.3)
BUN SERPL-MCNC: 19 MG/DL (ref 7–30)
CALCIUM SERPL-MCNC: 7.8 MG/DL (ref 8.5–10.1)
CHLORIDE SERPL-SCNC: 88 MMOL/L (ref 94–109)
CO2 SERPL-SCNC: 26 MMOL/L (ref 20–32)
CORTIS SERPL-MCNC: 30.4 UG/DL (ref 4–22)
CREAT SERPL-MCNC: 1.02 MG/DL (ref 0.52–1.04)
ERYTHROCYTE [DISTWIDTH] IN BLOOD BY AUTOMATED COUNT: 13.9 % (ref 10–15)
GFR SERPL CREATININE-BSD FRML MDRD: 53 ML/MIN/1.7M2
GLUCOSE SERPL-MCNC: 158 MG/DL (ref 70–99)
HCT VFR BLD AUTO: 29.5 % (ref 35–47)
HGB BLD-MCNC: 10.1 G/DL (ref 11.7–15.7)
MCH RBC QN AUTO: 29.5 PG (ref 26.5–33)
MCHC RBC AUTO-ENTMCNC: 34.2 G/DL (ref 31.5–36.5)
MCV RBC AUTO: 86 FL (ref 78–100)
PLATELET # BLD AUTO: 264 10E9/L (ref 150–450)
POTASSIUM SERPL-SCNC: 3.5 MMOL/L (ref 3.4–5.3)
POTASSIUM SERPL-SCNC: 4 MMOL/L (ref 3.4–5.3)
PROT SERPL-MCNC: 6.1 G/DL (ref 6.8–8.8)
RBC # BLD AUTO: 3.42 10E12/L (ref 3.8–5.2)
SODIUM SERPL-SCNC: 122 MMOL/L (ref 133–144)
SODIUM SERPL-SCNC: 122 MMOL/L (ref 133–144)
SODIUM SERPL-SCNC: 124 MMOL/L (ref 133–144)
SODIUM SERPL-SCNC: 124 MMOL/L (ref 133–144)
SODIUM SERPL-SCNC: 126 MMOL/L (ref 133–144)
SODIUM SERPL-SCNC: 127 MMOL/L (ref 133–144)
WBC # BLD AUTO: 12.8 10E9/L (ref 4–11)

## 2017-05-01 PROCEDURE — 25000128 H RX IP 250 OP 636: Performed by: PHYSICIAN ASSISTANT

## 2017-05-01 PROCEDURE — 82533 TOTAL CORTISOL: CPT | Performed by: PHYSICIAN ASSISTANT

## 2017-05-01 PROCEDURE — 25000132 ZZH RX MED GY IP 250 OP 250 PS 637: Mod: GY | Performed by: PHYSICIAN ASSISTANT

## 2017-05-01 PROCEDURE — 99232 SBSQ HOSP IP/OBS MODERATE 35: CPT | Mod: 25 | Performed by: OBSTETRICS & GYNECOLOGY

## 2017-05-01 PROCEDURE — 40000802 ZZH SITE CHECK

## 2017-05-01 PROCEDURE — 25000125 ZZHC RX 250: Performed by: PHYSICIAN ASSISTANT

## 2017-05-01 PROCEDURE — A9270 NON-COVERED ITEM OR SERVICE: HCPCS | Mod: GY | Performed by: PHYSICIAN ASSISTANT

## 2017-05-01 PROCEDURE — 84295 ASSAY OF SERUM SODIUM: CPT | Performed by: PHYSICIAN ASSISTANT

## 2017-05-01 PROCEDURE — 36592 COLLECT BLOOD FROM PICC: CPT | Performed by: ORTHOPAEDIC SURGERY

## 2017-05-01 PROCEDURE — 77386 ZZH IMRT TREATMENT DELIVERY, COMPLEX: CPT | Performed by: RADIOLOGY

## 2017-05-01 PROCEDURE — 25000125 ZZHC RX 250: Performed by: INTERNAL MEDICINE

## 2017-05-01 PROCEDURE — 36592 COLLECT BLOOD FROM PICC: CPT | Performed by: INTERNAL MEDICINE

## 2017-05-01 PROCEDURE — 80053 COMPREHEN METABOLIC PANEL: CPT | Performed by: INTERNAL MEDICINE

## 2017-05-01 PROCEDURE — 12000026 ZZH R&B TRANSPLANT

## 2017-05-01 PROCEDURE — 36592 COLLECT BLOOD FROM PICC: CPT | Performed by: PHYSICIAN ASSISTANT

## 2017-05-01 PROCEDURE — 84295 ASSAY OF SERUM SODIUM: CPT | Performed by: ORTHOPAEDIC SURGERY

## 2017-05-01 PROCEDURE — 84132 ASSAY OF SERUM POTASSIUM: CPT | Performed by: ORTHOPAEDIC SURGERY

## 2017-05-01 PROCEDURE — 76700 US EXAM ABDOM COMPLETE: CPT | Mod: XS

## 2017-05-01 PROCEDURE — 84295 ASSAY OF SERUM SODIUM: CPT | Performed by: INTERNAL MEDICINE

## 2017-05-01 PROCEDURE — 85027 COMPLETE CBC AUTOMATED: CPT | Performed by: PHYSICIAN ASSISTANT

## 2017-05-01 RX ORDER — ALBUTEROL SULFATE 90 UG/1
2 AEROSOL, METERED RESPIRATORY (INHALATION) EVERY 6 HOURS PRN
Status: DISCONTINUED | OUTPATIENT
Start: 2017-05-01 | End: 2017-05-03 | Stop reason: HOSPADM

## 2017-05-01 RX ADMIN — CARVEDILOL 6.25 MG: 6.25 TABLET, FILM COATED ORAL at 09:30

## 2017-05-01 RX ADMIN — ACETAMINOPHEN 650 MG: 325 TABLET, FILM COATED ORAL at 07:53

## 2017-05-01 RX ADMIN — DEXTROSE MONOHYDRATE 250 ML: 50 INJECTION, SOLUTION INTRAVENOUS at 02:29

## 2017-05-01 RX ADMIN — GUAIFENESIN 10 ML: 100 SOLUTION ORAL at 18:23

## 2017-05-01 RX ADMIN — SIMVASTATIN 40 MG: 40 TABLET, FILM COATED ORAL at 21:36

## 2017-05-01 RX ADMIN — DEXTROSE MONOHYDRATE 500 ML: 50 INJECTION, SOLUTION INTRAVENOUS at 01:28

## 2017-05-01 RX ADMIN — POTASSIUM CHLORIDE 20 MEQ: 1.5 POWDER, FOR SOLUTION ORAL at 04:01

## 2017-05-01 RX ADMIN — DEXTROSE MONOHYDRATE: 50 INJECTION, SOLUTION INTRAVENOUS at 03:01

## 2017-05-01 RX ADMIN — DEXTROSE MONOHYDRATE: 50 INJECTION, SOLUTION INTRAVENOUS at 00:24

## 2017-05-01 RX ADMIN — CARVEDILOL 6.25 MG: 6.25 TABLET, FILM COATED ORAL at 18:04

## 2017-05-01 RX ADMIN — ASPIRIN 81 MG: 81 TABLET, CHEWABLE ORAL at 19:38

## 2017-05-01 RX ADMIN — POTASSIUM CHLORIDE 40 MEQ: 1.5 POWDER, FOR SOLUTION ORAL at 01:34

## 2017-05-01 RX ADMIN — DEXTROSE MONOHYDRATE: 50 INJECTION, SOLUTION INTRAVENOUS at 06:35

## 2017-05-01 RX ADMIN — ACETAMINOPHEN 650 MG: 325 TABLET, FILM COATED ORAL at 00:30

## 2017-05-01 RX ADMIN — MONTELUKAST SODIUM 10 MG: 10 TABLET, FILM COATED ORAL at 21:36

## 2017-05-01 RX ADMIN — ENOXAPARIN SODIUM 40 MG: 40 INJECTION SUBCUTANEOUS at 00:30

## 2017-05-01 NOTE — PROGRESS NOTES
Gynecology Oncology Progress Note    Ms. Savannah Camacho is a 77 year old HD#2 for severe hyponatremia in the setting of newly diagnosed cervical cancer.    24 hour events:   - Slowly correcting hyponatremia    Subjective: Resting this AM, no acute complaints.    Objective:   Vitals:    04/30/17 2100 04/30/17 2130 04/30/17 2257 05/01/17 0404   BP: 139/73 138/76 135/66 101/64   BP Location:   Left arm Left arm   Resp:   20 18   Temp:   97.9  F (36.6  C) 97.5  F (36.4  C)   TempSrc:   Axillary Oral   SpO2: 97% 96% 97% 97%   Weight:    69.3 kg (152 lb 11.2 oz)   Height:           General: normal weight female, in NAD  CV: RRR, no m/r/g  Resp: CTAB  Abdomen: soft, mildly tender to palpation, nondistended  Extremities: nontender, trace edema b/l      New labs/imaging-   5/1/2017 04:57   WBC 12.8 (H)   Hemoglobin 10.1 (L)   Hematocrit 29.5 (L)   Platelet Count 264      5/1/2017 04:57   Sodium 122 (L)   Potassium 4.0   Chloride 88 (L)   Carbon Dioxide 26   Urea Nitrogen 19   Creatinine 1.02   Calcium 7.8 (L)   Anion Gap 9   Albumin 1.9 (L)   Protein Total 6.1 (L)   Bilirubin Total 0.5   Alkaline Phosphatase 258 (H)    (H)    (H)       Assessment: Savannah Camacho is a 77 year old with cervical cancer admitted for correction of severe hyponatremia.    Electrolyte correction per primary Medicine team.  GYN ONC will follow    - Discussed with Rad Onc and will proceed with daily radiation starting today  - Will hold chemotherapy until hyponatremia resolves    Nery Patel  OBGYN PGY2  Pager [phone]     Provider Disclosure:   I agree with above History, Review of Systems, Physical exam and Plan. I have reviewed the content of the documentation and have edited it as needed. I have personally performed the services documented here and the documentation accurately represents those services and the decisions I have made.     Electronically signed by:   Karina Owen MD   Gynecologic Oncology   [facility]  [facility]

## 2017-05-01 NOTE — PROGRESS NOTES
SPIRITUAL HEALTH SERVICES  SPIRITUAL ASSESSMENT Progress Note  [facility]   ON-CALL VISIT    REFERRAL SOURCE: Referral request by nursing staff.    Outcome:  Savannah was pleased for a visit today as she expressed that she has had a long road of Chemotherapy for a year and now she is facing knee replacement.  Her surgery is place for sometime after 3 today. Her son Sae was present at bedside with his mom.  Savannah expressed that hopefully she will be home tomorrow.     PLAN: [facility] will be available for Savannah for the duration of her stay in the hospital.    Sunil Chavez  Chaplain Resident  Pager [phone]

## 2017-05-01 NOTE — CONSULTS
Gynecology Oncology Consult  Savannah Camacho   1940  MRN [id number]    HPI: Savannah Camacho is a 77 year old female with newly diagnosed Stage IIA2 cervical cancer presenting with lightheadedness.  Savannah reports that for the last 3 days she has had lightheadedness and dizziness. Feels this way constantly, worsens with position changes and standing.  Additionally reports a mild headache for the last 3 days. Reports inability to void today. Last void prior to arrival was 3 am this morning.  Denies feeling an urge to void or bladder distention. Has had on and off dysuria in the past couple days.  Drank an estimated 81 oz of water today because she thought she needed fluids.  Typically drinks 6-8 cups of water per day.    Of note, she was driving down to [place] today from [place] to start radiation/chemotherapy treatments at the  on [address].  She reports during this drive down that she was concerned about the combination of symptoms so she came in.  In the ED, she was able to void 500 cc of clear yellow urine.  Labs were notable however for severe hyponatremia, hypokalemia, elevated LFTs and Creatinine.  Labs were consistent on repeat (Na 114>118).  She received 1L NS maintained at 100cc/hr and oral potassium replacement.    She reports that her headache and lightheadedness is improving. Anxious to be able to start radiation treatments tomorrow. Denies any chest pain, shortness of breath, abdominal pain, edema, nausea, vomiting, diarrhea, constipation, hematuria, hematochezia. Does report vaginal discharge, stable since early February and likely related to existing cervical cancer. Has history of MI s/p cath and stents and CHF. Last ECHO 2016 with grade I LV diastolic dysfunction and moderate tricuspid regurg. Per patient her cardiac status has been stable and no longer follows with a cardiologist. Currently on HCTZ, lisinopril, carvedilol, statin, and 81mg ASA, no recent medication  changes.    Gyn Onc history to date:  2012: Vaginal plaque, biopsy revealed vaginal hyperkeratosis. Tx with steroids  2015: Pap ASCUS, HPV neg  Jan 2017: foul smelling pink discharge: Note to have confluent lesion of the proximal vaginal 2x3cm with whitish appearance and extension into the right vaginal fornix - area of necrosis.  3/10/17: EUA with Biopsies: Biopsy pathology: well differentiated squamous cell carcinoma    4/19/17: MRI Pelvis: Large exophytic heterogeneously enhancing with necrotic areas is seen arising from the upper vagina/fornix on the right side measuring 3.4 x 3.6 x 4 cm. Superiorly the lesion appears to involve the right lateral aspect of the cervix with extension into the parametrium. No extension into the cervical canal. No definite extension into the uterus. Posteriorly there is involvement of the posterior wall of the upper vagina with extension across the midline to the left side. The lesion also abuts the midportion of the rectum with possible involvement. Thrombosis of a right parametrial venous structure is noted. A prominent right internal iliac lymph node measures 5 mm in short axis. Small amount of free fluid is noted in the pelvis. Visualized bowel loops otherwise appear unremarkable. Pelvic musculature is of normal appearance. No suspicious osseous lesions.    PET CT 4/20/17:  IMPRESSION: In this patient with malignant neoplasm of exocervix:  1. Cervical cancer arising from the right upper cervix, involving the  upper vagina corresponds to known malignancy, better characterized on  4/19/2017.  2. There are several mildly hypermetabolic lymph nodes in the pelvis  including right internal iliac chain, right common iliac chain and  left external iliac chain which he be treated as metastatic disease  under proven otherwise.   3. Nodular thickening in the right lung apex, likely presents fibrosis  or scarring. Recommend follow-up chest CT to ensure stability.      PMH:  Past Medical  History:   Diagnosis Date     Asthma      Cervical cancer (H)      Congestive heart failure (CHF) (H)      Hyperlipidemia      Hypertension      Lung nodule     Long standing Rt apical lung nodule     MI (myocardial infarction) (H) 2009    involving anterior wall        PSH:  Past Surgical History:   Procedure Laterality Date     BIOPSY OF VAGINA,SIMPLE  02/2017    also bx from 2012,      COLONOSCOPY       HEART CATH STENT COR W/WO PTCA  2009       Meds:  Prescriptions Prior to Admission   Medication Sig Dispense Refill Last Dose     Montelukast Sodium (SINGULAIR PO) Take 10 mg by mouth once    Taking     HYDROCHLOROTHIAZIDE PO Take 12.5 mg by mouth daily        LISINOPRIL PO Take 5 mg by mouth 2 times daily    Taking     SIMVASTATIN PO Take 40 mg by mouth once    Taking     Carvedilol (COREG PO) Take 6.25 mg by mouth 2 times daily (with meals)    Taking     albuterol (PROAIR HFA/PROVENTIL HFA/VENTOLIN HFA) 108 (90 BASE) MCG/ACT Inhaler Inhale 2 puffs into the lungs every 6 hours Reported on 4/20/2017   Not Taking     Multiple Vitamins-Minerals (MULTIVITAMIN ADULT PO) Take by mouth daily    Taking     ASPIRIN PO Take 81 mg by mouth daily    Taking       Allergies:     Allergies   Allergen Reactions     Amoxicillin Rash       Social History:  Social History     Social History     Marital status:      Spouse name: N/A     Number of children: N/A     Years of education: N/A     Occupational History     Not on file.     Social History Main Topics     Smoking status: Never Smoker     Smokeless tobacco: Never Used     Alcohol use Yes     Drug use: No     Sexual activity: Not on file     Other Topics Concern     Not on file     Social History Narrative       Objective:  Vitals:    04/30/17 2000 04/30/17 2030 04/30/17 2100 04/30/17 2130   BP: 137/79 140/74 139/73 138/76   Resp:       Temp:       TempSrc:       SpO2: 99% 97% 97% 96%   Height:         Gen: lying in bed, appearing comfortable, NAD, responding  appropriately, alert and oriented  Neuro: AOx3  Heart: RRR no m/g/r  Lungs: CTAB, appropriate work of breathing, coughs occasionally  Abd: soft, nontender, nondistended,  : deferred  Ext: warm and well perfused, minimal LE edema    Labs/Imaging:  Results for orders placed or performed during the hospital encounter of 04/30/17 (from the past 24 hour(s))   CBC with platelets differential   Result Value Ref Range    WBC 16.4 (H) 4.0 - 11.0 10e9/L    RBC Count 3.73 (L) 3.8 - 5.2 10e12/L    Hemoglobin 11.0 (L) 11.7 - 15.7 g/dL    Hematocrit 32.2 (L) 35.0 - 47.0 %    MCV 86 78 - 100 fl    MCH 29.5 26.5 - 33.0 pg    MCHC 34.2 31.5 - 36.5 g/dL    RDW 13.9 10.0 - 15.0 %    Platelet Count 242 150 - 450 10e9/L    Diff Method Manual Differential     % Neutrophils 84.2 %    % Lymphocytes 7.9 %    % Monocytes 7.0 %    % Eosinophils 0.9 %    % Basophils 0.0 %    Absolute Neutrophil 13.8 (H) 1.6 - 8.3 10e9/L    Absolute Lymphocytes 1.3 0.8 - 5.3 10e9/L    Absolute Monocytes 1.1 0.0 - 1.3 10e9/L    Absolute Eosinophils 0.1 0.0 - 0.7 10e9/L    Absolute Basophils 0.0 0.0 - 0.2 10e9/L    RBC Morphology Normal    Comprehensive metabolic panel   Result Value Ref Range    Sodium 114 (LL) 133 - 144 mmol/L    Potassium 2.9 (L) 3.4 - 5.3 mmol/L    Chloride 77 (L) 94 - 109 mmol/L    Carbon Dioxide 27 20 - 32 mmol/L    Anion Gap 10 3 - 14 mmol/L    Glucose 114 (H) 70 - 99 mg/dL    Urea Nitrogen 25 7 - 30 mg/dL    Creatinine 1.29 (H) 0.52 - 1.04 mg/dL    GFR Estimate 40 (L) >60 mL/min/1.7m2    GFR Estimate If Black 49 (L) >60 mL/min/1.7m2    Calcium 8.1 (L) 8.5 - 10.1 mg/dL    Bilirubin Total 0.6 0.2 - 1.3 mg/dL    Albumin 2.3 (L) 3.4 - 5.0 g/dL    Protein Total 7.1 6.8 - 8.8 g/dL    Alkaline Phosphatase 287 (H) 40 - 150 U/L     (H) 0 - 50 U/L     (H) 0 - 45 U/L   Nt probnp inpatient   Result Value Ref Range    N-Terminal Pro BNP Inpatient 1791 0 - 1800 pg/mL   TSH   Result Value Ref Range    TSH 1.38 0.40 - 4.00 mU/L   UA with  Microscopic reflex to Culture   Result Value Ref Range    Color Urine Straw     Appearance Urine Clear     Glucose Urine Negative NEG mg/dL    Bilirubin Urine Negative NEG    Ketones Urine Negative NEG mg/dL    Specific Gravity Urine 1.002 (L) 1.003 - 1.035    Blood Urine Trace (A) NEG    pH Urine 6.0 5.0 - 7.0 pH    Protein Albumin Urine Negative NEG mg/dL    Urobilinogen mg/dL Normal 0.0 - 2.0 mg/dL    Nitrite Urine Negative NEG    Leukocyte Esterase Urine Small (A) NEG    Source Midstream Urine     WBC Urine 3 (H) 0 - 2 /HPF    RBC Urine 4 (H) 0 - 2 /HPF    Bacteria Urine Few (A) NEG /HPF    Squamous Epithelial /HPF Urine 1 0 - 1 /HPF   ISTAT gases elec ica gluc franco POCT   Result Value Ref Range    Ph Venous 7.42 7.32 - 7.43 pH    PCO2 Venous 43 40 - 50 mm Hg    PO2 Venous 24 (L) 25 - 47 mm Hg    Bicarbonate Venous 28 21 - 28 mmol/L    O2 Sat Venous 44 %    Sodium 118 (LL) 133 - 144 mmol/L    Potassium 3.3 (L) 3.4 - 5.3 mmol/L    Glucose 120 (H) 70 - 99 mg/dL    Calcium Ionized 4.1 (L) 4.4 - 5.2 mg/dL    Hemoglobin 10.9 (L) 11.7 - 15.7 g/dL    Hematocrit - POCT 32 (L) 35.0 - 47.0 %PCV   EKG 12-lead, tracing only   Result Value Ref Range    Interpretation ECG Click View Image link to view waveform and result        Assessment/Plan: Savannah Camacho is a 77 year old with cervical cancer admitted for correction of severe hyponatremia. Additionally complicated by elevated LFTs and creatinine. Sodium replacement per primary Medicine.  GYN ONC will follow with patient while admitted.  Will likely need to delay Radiation and chemotherapy while admitted for sodium correction.  However, will contact Rad Onc in AM regarding patient admit and treatment schedule moving forward.    Discussed with Dr. Linares, GYN ONC fellow    Jorge Gabriel MD  OBGYN PGY-2  Gyn Onc Pager [phone]  4/30/2017, 9:19 PM

## 2017-05-01 NOTE — PROVIDER NOTIFICATION
Time of notification: 1:15 AM  MD notified: Dr. Baldwin, [place] cross cover  Patient status: Last Na check 124  Temp:  [97.8  F (36.6  C)-97.9  F (36.6  C)] 97.9  F (36.6  C)  Heart Rate:  [74-94] 79  Resp:  [18-20] 20  BP: (135-154)/(66-79) 135/66  SpO2:  [96 %-99 %] 97 %  Orders received: D5W bolus and Na re-check, will continue to monitor

## 2017-05-01 NOTE — PROGRESS NOTES
[facility], [place]    Internal Medicine Progress Note - Gold Service      Assessment & Plan   Savannah Camacho is a 77 year old female admitted on 4/30/2017. She has a history of Stage IIB SCC of cervix, HTN, Grade 1 diastolic dysfunction who is admitted for hyponatremia.     Hyponatremia. Sodium of 114 on presentation to ED, s/p 1L IVF with improvement to 118. Likely hypovolemic due to poor oral intake and low urine output PTA. TSH wnl. On HCTZ PTA. Likely multifactorial due to poor PO intake w/ diuresis. AM cortisol is 30. No evidence of SIADH on urine studies. Sodium 122-124 in past 12hrs. Recheck 5/1 at 1600 is 127. Appears euvolemic on exam today and denies current symptoms.   - Continue IV D5W  - Check sodium q6hrs (2200, 0400, and 1200)  - Goal is to keep rise of Na <9 in 24hr period  - Continue to hold HCTZ (BPs are stable with occasional hypotension)    Elevated LFTs. Downtrending today to AlkPhos 258, , and  . Abdominal US unremarkable. Denies previous liver disease, alcohol use, acetaminophen use, and hepatitis. Suspect likely 2/2 acute illness due to downtrend with stabilization.   - Trend CMP daily     Leukocytosis. WBC downtrending today to 12.8. Currently afebrile with stable vital signs. No acute complaints other than chronic dry cough. CXR unremarkable. BCx negative to date. Abdominal US unremarkable. UA without infection. Likely a stress response with severe hyponatremia.  - Robitussin PRN for cough  - Trend CBC tomorrow    ISABELLA. Baseline Cr is ~0.9, currently 1.02 stable. Suspect ISABELLA was due to dehydration on admission.  - Continue to hold PTA Lisinopril and HCTZ  - Trend CMP tomorrow  - Avoid nephrotoxic agents if possible    Stage IIB SCC of Cervix. Follows with Oncology OP with plans to start radiation today and chemo tomorrow. She plans to return to [facility] after hospitalization since she lives in [place]. Gyn/Onc following.   -  "Radiation today   - Chemo will be held due to electrolyte abnormalities    HTN. PTA on HCTZ, Coreg, and Lisinopril. BPs are stable 134/65 with low of 110/59 after recheck.  - Continue Coreg  - Continue to hold Lisinopril and HCTZ in setting of ISABELLA    Mild Persistent Asthma. No acute concerns. Cough is stable. O2 sat nl on RA. Continue singulair and PRN albuterol inhaler.    Moderate Tricuspid Regurgitation, Grade I LV Diastolic Dysfunction. Echo in 05/2016 with normal LV size, normal LV systolic function, mildly enlarged left atrium, grade 1 diastolic dysfunction and moderate tricuspid regurg. Previously followed with Cardiology for Tak[place]o Cardiomyopathy with recs for PRN follow up. Appears compensated with acute concerns.     Pulmonary Nodule. Follows with Pulm at OSH, most recently seen in 11/2016 with plans for repeat CT in 1 year.     HLD. Continue PTA Zocor    Resolved Hospital Problems.  Hypokalemia. Resolved. K 2.9 on admission, resolved with replacement. Currently 3.5.      Diet: Regular Diet Adult  Fluids: D5W at 125cc/hr  DVT Prophylaxis: Enoxaparin (Lovenox) SQ  Code Status: Full Code    Disposition Plan   Expected discharge: 2 - 3 days; recommended to [facility] once hyponatremia is resolved.     Entered: Godfrey Denson 05/01/2017, 3:41 PM   Information in the above section will display in the discharge planner report.      The patient's care was discussed with the Attending Physician, Dr. Hernandez, Bedside Nurse, Patient and Patient's Family.    Godfrey Denson  Internal Medicine Staff Hospitalist Service  [facility]  Pager: [phone]  Please see sticky note for cross cover information    Interval History     Patient reports a mild HA today, resolved with acetaminophen this AM. She had headaches prior to admission, likely due to electrolyte abnormalities and dehydration. Otherwise feels \"much better today\". She reports urinating frequently with addition of IVF. She reports a " chronic non-productive cough. No chest pain or dyspnea.    She denies fevers, chills, nausea, vomiting, chest pain, shortness of breath, abdominal pain, constipation, diarrhea, swelling, and new rashes.      Data reviewed today: I reviewed all medications, new labs and imaging results over the last 24 hours. I personally reviewed CBC, CMP   Physical Exam   Vital Signs: Temp: 97.8  F (36.6  C) Temp src: Oral BP: 110/59   Heart Rate: 80 Resp: 16 SpO2: 97 % O2 Device: None (Room air)    Weight: 152 lbs 11.2 oz  General Appearance: Alert and Oriented x 3. NAD. MMM.   Respiratory: Effort normal on RA. Lungs CTAB. No wheezing, rales, rhonchi.   Cardiovascular: RRR. S1, S2. No murmurs noted.   GI: Abdomen soft, non-distended and non-tender. Bowel sounds present.  Skin: No jaundice. No rashes on exposed areas of skin.

## 2017-05-01 NOTE — PROGRESS NOTES
Repeat sodium 121, will start D5W at 50 cc/hour and then recheck 0000.     Signed out to Dr. Baldwin.    RACQUEL Lopez

## 2017-05-01 NOTE — PROGRESS NOTES
Repeat sodium at midnight : 124. Hyponatremia is likely hypovolemic given orthostatic symptoms and poor urine output on presentation. Urine studies also c/w this and does not suggest SIADH. Talked to renal fellow Dr Lemus. She suggested giving free water boluses to prevent rapid correction. Will start with 750 cc D5W bolus and recheck sodium after that per her recs. Goal sodium 124 at 7 pm tonight. So, need to adjust free water infusion with close monitoring of sodium every 2 hours. No indication for DDAVP per discussion with Dr Lemus.     Isabel Baldwin MD  IM staff hospitalist  Pager: [phone]

## 2017-05-01 NOTE — PROGRESS NOTES
Admission          4/30/2017  10:57 PM  -----------------------------------------------------------  Reason for admission: lightheadedness  Primary team notified of pt arrival.  Admitted from: ED  Via: stretcher  Accompanied by: self,  went home  Belongings: Placed in closet  Admission Profile: complete  Teaching: orientation to unit and call light- call light within reach, call don't fall, use of console, meal times, when to call for the RN, and enforced importance of safety   Access: PIV and midline  Telemetry:Placed on pt  Ht./Wt.: complete  2 RN Skin Assessment Completed By: Vikki Stauffer  Pt status: assessment complete, VSS, on RA, will continue to monitor    Temp:  [97.8  F (36.6  C)-97.9  F (36.6  C)] 97.9  F (36.6  C)  Heart Rate:  [74-94] 79  Resp:  [18-20] 20  BP: (135-154)/(66-79) 135/66  SpO2:  [96 %-99 %] 97 %

## 2017-05-01 NOTE — PROGRESS NOTES
SPIRITUAL HEALTH SERVICES  SPIRITUAL ASSESSMENT Progress Note  [facility] U6[facility]   ON-CALL VISIT    REFERRAL SOURCE: Admissions at intake.    Outcome:  Savannah decline  visit as she was responding to the question about seeing a .  I did a short introduction about [facility] for Savannah.     PLAN: [facility] will be available for Savannah for the duration of her stay in hospital upon her request.    Sunil Chavez  Chaplain Resident  Pager [phone]

## 2017-05-01 NOTE — PROGRESS NOTES
Gyn Oncology Progress Note    We attempted to see patient early this morning, but she was asleep at the time of our visit.  Discussed with Dr. Carrion, radiation oncologist, this morning regarding Savannah as she was suppose to start external beam radiation today.  Per Dr. Carrion, patient can still proceed with external beam radiation today, but we will hold off on chemotherapy with cisplatin for now given electrolyte abnormalities.        Chen Harley MD  OB/GYN PGY4  GYN Oncology [phone]

## 2017-05-01 NOTE — PLAN OF CARE
Problem: Goal Outcome Summary  Goal: Goal Outcome Summary  Outcome: No Change  [id number]: A&O, neuros intact, VSS except BP soft at mid-shift, 88/37, recheck a few minutes later 110/59, states vague feeling of lightheadedness. SBA when up. Intermitt, dry, nagging cough, pre-existing per pt. Tylenol for headache with relief. Plan for serial Na+ checks, radiation today. Transferred to , report given to accepting nurse.

## 2017-05-01 NOTE — PROGRESS NOTES
"CLINICAL NUTRITION SERVICES - ASSESSMENT NOTE     Nutrition Prescription    RECOMMENDATIONS FOR MDs/PROVIDERS TO ORDER:  Recommend trial of oral supplements (Ensure Plus) if patient consuming less than 75% of meals TID    Continue MVI with minerals if appropriate.    Malnutrition Status:    Unable to determine due to incomplete data       REASON FOR ASSESSMENT  Savannah Camacho is a/an 77 year old female assessed by the dietitian for Admission Nutrition Risk Screen for reduced oral intake over the last month    NUTRITION HISTORY  Unable to obtain.  Recent diagnosis of cervical cancer in March.  Started radiation treatment today with a plan for chemoradiation for treatment.     CURRENT NUTRITION ORDERS  Diet: Regular    LABS  Hyponatremia 122-124    MEDICATIONS  Medications reviewed    ANTHROPOMETRICS  Height: 162.6 cm (5' 4\")  Most Recent Weight: 69.3 kg (152 lb 11.2 oz) (standing #1)    IBW: 54.5 kg  BMI: Overweight BMI 25-29.9  Weight History: Weight appears to be up over the last 2 weeks?  Wt Readings from Last 15 Encounters:   05/01/17 69.3 kg (152 lb 11.2 oz)   04/20/17 64.7 kg (142 lb 9.6 oz)   04/19/17 66.7 kg (147 lb)     Dosing Weight: 58 kg (adjusted wt based on actual 69.3 kg and IBW of 54.5 kg)    ASSESSED NUTRITION NEEDS  Estimated Energy Needs: 7153-6439 kcals/day (25 - 30 kcals/kg)  Justification: Maintenance  Estimated Protein Needs: 70-87 grams protein/day (1.2 - 1.5 grams of pro/kg)  Justification: Hypercatabolism with cancer  Estimated Fluid Needs: 1 mL/kcal   Justification: Maintenance    PHYSICAL FINDINGS  See malnutrition section below.    MALNUTRITION  % Intake: Unable to assess  % Weight Loss: None noted  Subcutaneous Fat Loss: Unable to assess  Muscle Loss: Unable to assess  Fluid Accumulation/Edema: Unable to assess  Malnutrition Diagnosis: Unable to determine due to incomplete data    NUTRITION DIAGNOSIS  Predicted inadequate nutrient intake (kcal/pro) related to (?) cancer diagnosis "     INTERVENTIONS  Implementation  Nutrition Education: Unable to complete due to patient busy upon attempted visits x 2.      Goals  Patient to consume % of nutritionally adequate meal trays TID, or the equivalent with supplements/snacks.     Monitoring/Evaluation  Progress toward goals will be monitored and evaluated per protocol.    Gladys Menezes MS, RD, LD  Pager [phone]

## 2017-05-01 NOTE — PLAN OF CARE
Problem: Goal Outcome Summary  Goal: Goal Outcome Summary  Outcome: No Change  Neuro: A&Ox4.  Cardiac: SR 60-70. VSS.         Respiratory: On RA. Frequent cough.  GI/: Adequate urine output. No BM this shift.  Diet/appetite: On regular diet.  Activity:  SBA to bathroom.  Pain: Given prn tylenol for pain, relief noted. No nausea noted.  Skin: Intact, no deficits noted.      R: Frequent Na checks. Given 2 D5W boluses. Last Na check 122. Potassium replaced per protocol. Continue with POC. Notify primary team with changes.

## 2017-05-01 NOTE — PROGRESS NOTES
Oriented to unit 7A.  here. Pleasant. Told to call for first time she gets up to bathroom. Has frequent cough.

## 2017-05-01 NOTE — PLAN OF CARE
Problem: Individualization  Goal: Patient Preferences  Outcome: No Change  Pt went for radiation, states feels ok upon returning. Will recheck sodium.

## 2017-05-01 NOTE — H&P
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     #Hyponatremia: Sodium of 114 on presentation to ED, improved to 118 after 1 L of IVF. Last sodium on file at  03/03/17. Drank 83 oz of water today. Symptoms include weakness, nausea. Appears euvolemic to mildly hypovolemic on exam. TSH normal. On HCTZ for BP. Not on chemotherapy. 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 Goal sodium correction is no more than 8 meq in 24 hours (122 by 1900 05/01).  -Sodium now, then 0000, then q4 hours  -urine sodium, urine osmolality, FENA added on  -Will hold IVF hydration for now given rapid correction  -Hold HCTZ  -Renal US to r/o obstruction  -Cortisol in AM  -Consider nephrology consult overnight if sodium >122    #Leukocytosis: WBC count of 16.4 w/ ANC of 13.8. Afebrile and VSS. UA w/o evidence of infection. Dry cough which is chronic. New LFT abnormalities w/ nausea but denies any pain. Recent PET scan from 04/20 w/o evidence of infection. Not on steroids.  -Trend CBC in AM, if still elevated consider peripheral smear  -CXR  -BC ordered  -Abd US as below     #Abnormal LFTs: Alk phos 287, ,  w/ normal t bili. No abdominal pain, + N w/ no vomiting. No lytic lesions on PET scan.   -Trend in AM  -Abdominal US    #ISABELLA: Creat of 1.29 on admission w/ GFR of 40, baseline per OSH records is ~0.9. BUN/creat ratio 19.37. UA w/o protein, trace blood, microscopy w/ few bacteria and 3 WBC, no casts. Endorses poor PO intake prior to today, then consumed 83 oz of water.   -Recheck in AM  -Is/Os, daily weights  -Renal US  -Avoid nephrotoxics as able (hold home ARB and  HCTZ)    #Hypokalemia: K 2.9, improved to 3.3 w/ correction. Likely due to poor PO intake.  -Replace per protocol    #Stage IIB SCC of the cervix: Follows w/ oncology as OP w/ plans to initiate chemoradiation at [facility] cancer clinic tomorrow while staying at [facility].   -Oncology consulted    #HTN: On HCTZ, Coreg, Lisinopril PTA. Normotensive on admission, goal BP <150/90 given age.   -Hold HCTZ and ACE  -Continue coreg  -Monitor BP    #Moderate tricuspid regurgitation, grade I LV diastolic dyfunction: ECHO in 05/2016 w/ normal LV size, normal LV systolic function, mildly enlarged left atrium, grade I diasolic dysfunction, moderate tricuspid regurg. Previously followed w/ cardiology at OSH for takutsubo cardiomyopathy, compensated w/ plans for PRN follow up.  -Appears compensated    #Pulmonary nodule: Follows with pulmonary at OSH, most recent visit 11/2016 w/ plans for repeat CT scan in 1 year.   -Continue OP pulmonary follow up  -CXR    #Hyperlipidemia: On Zocor PTA. Continue.     Diet:  Regular diet  Fluids: No IVF as of now, will adjust based on sodium levels  DVT Prophylaxis: Low Risk/Ambulatory with no VTE prophylaxis indicated and Enoxaparin (Lovenox) SQ  Code Status: No Order    Disposition Plan   Expected discharge: 2 - 3 days; recommended to [facility] once hyponatremia improves.     Entered: Freida Dewitt 04/30/2017, 9:18 PM   Information in the above section will display in the discharge planner report.    The patient's care was discussed with the Attending Physician, Dr. Hobbs.    RACQUEL Waldrop  Internal Medicine MEG Hospitalist Service  [facility]  Pager: [phone]  Please see sticky note for cross cover information    10 point ROS is negative except as mentioned in the HPI  PMH, PSH, medications, SH, and FMH were reviewed and confirmed by myself    Labs and VS reviewed  Well appearing but tired, intermittent coughing, voice a little hoarse. Lungs clear. Trace  "non-pitting edema of the LE    I saw and evaluated this patient with RACQUEL Dewitt. I agree with his/her assessment of Rigoberto 76 yo woman with cervical cancer presenting with severe hyponatremia. Her history is supports multiple etiologies including malignancy, decreased oral intake, HCTZ use, and excessive free water intake in the 4-6 hours prior to admission. Review of her urine studies is somewhat equivocal; her urine is quite dilute, though, may hint at underlying component of SIADH in given that her U osm remain greater than 100 vs mild intravascular volume depletion (though this is not really supported by her exam,vitals, or history). I am going to presume that she had some component of pre-existing hyponatremia and proceed with cautious correction over the next 48 hours, goal within first day will be Na no greater than 124. We may need to start D5 to control the correction, consider discussing DDAVP with nephrology if the Na is rising too quickly.     Pj Hobbs MD  Date I saw the patient: 4/30/17     ______________________________________________________________________    Chief Complaint   Lightheadedness    History is obtained from the patient.    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     The patient notes that for the last 2 days she has noted lightheadedness, worse with standing, but today was constant and even present when sitting. She notes generalized weakness. She denies current pain but had a headache earlier today. Denies mental status changes. She endorses nausea the last couple of days, no vomiting, no abdominal pain, has had to \"force myself\" to eat. She notes poor intake the last couple of days. She states her biggest concern was lightheadedness and inability to pee today. She notes prior to urinating in " the ED she didn't urinate at all since 0300 this AM. She notes that she drank 3 23 oz bottles of water plus a 16 oz bottle of water on the way down due to her inability to urinate. She denies abdominal pain or distention. She denies dysuria when she was able to urinate. No new med changes.     Review of Systems   The 10 point Review of Systems is negative other than noted in the HPI or here.     Past Medical History    I have reviewed this patient's medical history and updated it with pertinent information if needed.   Past Medical History:   Diagnosis Date     Asthma      Cervical cancer (H)      Congestive heart failure (CHF) (H)      Hyperlipidemia      Hypertension      Lung nodule     Long standing Rt apical lung nodule     MI (myocardial infarction) (H) 2009    involving anterior wall         Past Surgical History   I have reviewed this patient's surgical history and updated it with pertinent information if needed.  Past Surgical History:   Procedure Laterality Date     BIOPSY OF VAGINA,SIMPLE  02/2017    also bx from 2012,      COLONOSCOPY       HEART CATH STENT COR W/WO PTCA  2009        Social History   Social History   Substance Use Topics     Smoking status: Never Smoker     Smokeless tobacco: Never Used     Alcohol use Yes   [place] . Notes occasional alcohol use, not daily.     Family History   I have reviewed this patient's family history and updated it with pertinent information if needed.   Family History   Problem Relation Age of Onset     CANCER Other      Daughter had Breast Cancer at age 39       Prior to Admission Medications   Prior to Admission Medications   Prescriptions Last Dose Informant Patient Reported? Taking?   ASPIRIN PO   Yes No   Sig: Take 81 mg by mouth daily    Carvedilol (COREG PO)   Yes No   Sig: Take 6.25 mg by mouth 2 times daily (with meals)    HYDROCHLOROTHIAZIDE PO   Yes No   Sig: Take 12.5 mg by mouth daily   LISINOPRIL PO   Yes No   Sig:  Take 5 mg by mouth 2 times daily    Montelukast Sodium (SINGULAIR PO)   Yes No   Sig: Take 10 mg by mouth once    Multiple Vitamins-Minerals (MULTIVITAMIN ADULT PO)   Yes No   Sig: Take by mouth daily    SIMVASTATIN PO   Yes No   Sig: Take 40 mg by mouth once    albuterol (PROAIR HFA/PROVENTIL HFA/VENTOLIN HFA) 108 (90 BASE) MCG/ACT Inhaler   Yes No   Sig: Inhale 2 puffs into the lungs every 6 hours Reported on 4/20/2017      Facility-Administered Medications: None     Allergies   Allergies   Allergen Reactions     Amoxicillin Rash       Physical Exam   Vital Signs: Temp: 97.8  F (36.6  C) Temp src: Oral BP: 139/73   Heart Rate: 79 Resp: 18 SpO2: 97 % O2 Device: None (Room air)    Weight: 0 lbs 0 oz  GENERAL: Alert and oriented x 3. Lying comfortably in bed.   HEENT: Anicteric sclera. PERRL. EOMI. Mucous membranes moist and without lesions.   CV: RRR. S1, S2. No murmurs appreciated.   RESPIRATORY: Effort normal on RA. Lungs CTAB with no wheezing, rales, rhonchi.   GI: Abdomen soft and non distended, bowel sounds present. No tenderness, rebound, guarding.   MUSCULOSKELETAL: No joint swelling or tenderness. Moves all extremities.   NEUROLOGICAL: No focal deficits.   EXTREMITIES: Trace bilateral LE peripheral edema. Intact bilateral pedal pulses.   SKIN: No jaundice. No rashes.     Data   Data reviewed today: I reviewed all medications, new labs and imaging results over the last 24 hours. I personally reviewed no images or EKG's today.    Reviewed CBC, CMP, glucose, INR    EKG w/ NSR, no acute ST segment elevation or depression

## 2017-05-02 ENCOUNTER — APPOINTMENT (OUTPATIENT)
Dept: RADIATION ONCOLOGY | Facility: CLINIC | Age: 77
End: 2017-05-02
Attending: RADIOLOGY
Payer: MEDICARE

## 2017-05-02 DIAGNOSIS — C53.1 MALIGNANT NEOPLASM OF EXOCERVIX (H): Primary | ICD-10-CM

## 2017-05-02 LAB
ALBUMIN SERPL-MCNC: 2 G/DL (ref 3.4–5)
ALP SERPL-CCNC: 261 U/L (ref 40–150)
ALT SERPL W P-5'-P-CCNC: 131 U/L (ref 0–50)
ANION GAP SERPL CALCULATED.3IONS-SCNC: 8 MMOL/L (ref 3–14)
AST SERPL W P-5'-P-CCNC: 113 U/L (ref 0–45)
BILIRUB SERPL-MCNC: 0.6 MG/DL (ref 0.2–1.3)
BUN SERPL-MCNC: 9 MG/DL (ref 7–30)
CALCIUM SERPL-MCNC: 8.3 MG/DL (ref 8.5–10.1)
CHLORIDE SERPL-SCNC: 92 MMOL/L (ref 94–109)
CO2 SERPL-SCNC: 26 MMOL/L (ref 20–32)
CREAT SERPL-MCNC: 0.87 MG/DL (ref 0.52–1.04)
ERYTHROCYTE [DISTWIDTH] IN BLOOD BY AUTOMATED COUNT: 14.3 % (ref 10–15)
GFR SERPL CREATININE-BSD FRML MDRD: 64 ML/MIN/1.7M2
GLUCOSE SERPL-MCNC: 118 MG/DL (ref 70–99)
HCT VFR BLD AUTO: 31.4 % (ref 35–47)
HGB BLD-MCNC: 10.7 G/DL (ref 11.7–15.7)
INTERPRETATION ECG - MUSE: NORMAL
MCH RBC QN AUTO: 29.8 PG (ref 26.5–33)
MCHC RBC AUTO-ENTMCNC: 34.1 G/DL (ref 31.5–36.5)
MCV RBC AUTO: 88 FL (ref 78–100)
PLATELET # BLD AUTO: 338 10E9/L (ref 150–450)
POTASSIUM SERPL-SCNC: 3.6 MMOL/L (ref 3.4–5.3)
PROT SERPL-MCNC: 6.8 G/DL (ref 6.8–8.8)
RBC # BLD AUTO: 3.59 10E12/L (ref 3.8–5.2)
SODIUM SERPL-SCNC: 127 MMOL/L (ref 133–144)
SODIUM SERPL-SCNC: 128 MMOL/L (ref 133–144)
SODIUM SERPL-SCNC: 129 MMOL/L (ref 133–144)
WBC # BLD AUTO: 11.6 10E9/L (ref 4–11)

## 2017-05-02 PROCEDURE — 40000802 ZZH SITE CHECK

## 2017-05-02 PROCEDURE — 85027 COMPLETE CBC AUTOMATED: CPT | Performed by: PHYSICIAN ASSISTANT

## 2017-05-02 PROCEDURE — 25000132 ZZH RX MED GY IP 250 OP 250 PS 637: Mod: GY | Performed by: PHYSICIAN ASSISTANT

## 2017-05-02 PROCEDURE — 86706 HEP B SURFACE ANTIBODY: CPT | Performed by: PHYSICIAN ASSISTANT

## 2017-05-02 PROCEDURE — 99232 SBSQ HOSP IP/OBS MODERATE 35: CPT | Mod: GC | Performed by: OBSTETRICS & GYNECOLOGY

## 2017-05-02 PROCEDURE — A9270 NON-COVERED ITEM OR SERVICE: HCPCS | Mod: GY | Performed by: PHYSICIAN ASSISTANT

## 2017-05-02 PROCEDURE — 87340 HEPATITIS B SURFACE AG IA: CPT | Performed by: PHYSICIAN ASSISTANT

## 2017-05-02 PROCEDURE — 25000128 H RX IP 250 OP 636: Performed by: PHYSICIAN ASSISTANT

## 2017-05-02 PROCEDURE — 86803 HEPATITIS C AB TEST: CPT | Performed by: PHYSICIAN ASSISTANT

## 2017-05-02 PROCEDURE — 84295 ASSAY OF SERUM SODIUM: CPT | Performed by: PHYSICIAN ASSISTANT

## 2017-05-02 PROCEDURE — 77386 ZZH IMRT TREATMENT DELIVERY, COMPLEX: CPT | Performed by: RADIOLOGY

## 2017-05-02 PROCEDURE — 12000026 ZZH R&B TRANSPLANT

## 2017-05-02 PROCEDURE — 80053 COMPREHEN METABOLIC PANEL: CPT | Performed by: PHYSICIAN ASSISTANT

## 2017-05-02 PROCEDURE — 36415 COLL VENOUS BLD VENIPUNCTURE: CPT | Performed by: PHYSICIAN ASSISTANT

## 2017-05-02 PROCEDURE — 99207 ZZC APP CREDIT; MD BILLING SHARED VISIT: CPT | Performed by: PHYSICIAN ASSISTANT

## 2017-05-02 PROCEDURE — 86709 HEPATITIS A IGM ANTIBODY: CPT | Performed by: PHYSICIAN ASSISTANT

## 2017-05-02 PROCEDURE — 99232 SBSQ HOSP IP/OBS MODERATE 35: CPT | Performed by: INTERNAL MEDICINE

## 2017-05-02 PROCEDURE — 99207 ZZC CDG-MDM COMPONENT: MEETS LOW - DOWN CODED: CPT | Performed by: INTERNAL MEDICINE

## 2017-05-02 RX ORDER — PHENAZOPYRIDINE HYDROCHLORIDE 200 MG/1
200 TABLET, FILM COATED ORAL ONCE
Status: CANCELLED | OUTPATIENT
Start: 2017-05-02 | End: 2017-05-02

## 2017-05-02 RX ORDER — CLINDAMYCIN PHOSPHATE 900 MG/50ML
900 INJECTION, SOLUTION INTRAVENOUS
Status: CANCELLED | OUTPATIENT
Start: 2017-05-02

## 2017-05-02 RX ORDER — SODIUM CHLORIDE 9 MG/ML
INJECTION, SOLUTION INTRAVENOUS CONTINUOUS
Status: DISCONTINUED | OUTPATIENT
Start: 2017-05-02 | End: 2017-05-03

## 2017-05-02 RX ADMIN — DEXTROSE MONOHYDRATE: 50 INJECTION, SOLUTION INTRAVENOUS at 02:36

## 2017-05-02 RX ADMIN — DEXTROSE MONOHYDRATE: 50 INJECTION, SOLUTION INTRAVENOUS at 12:30

## 2017-05-02 RX ADMIN — SIMVASTATIN 40 MG: 40 TABLET, FILM COATED ORAL at 21:48

## 2017-05-02 RX ADMIN — ENOXAPARIN SODIUM 40 MG: 40 INJECTION SUBCUTANEOUS at 00:26

## 2017-05-02 RX ADMIN — CARVEDILOL 6.25 MG: 6.25 TABLET, FILM COATED ORAL at 18:47

## 2017-05-02 RX ADMIN — CARVEDILOL 6.25 MG: 6.25 TABLET, FILM COATED ORAL at 07:53

## 2017-05-02 RX ADMIN — SODIUM CHLORIDE: 9 INJECTION, SOLUTION INTRAVENOUS at 14:20

## 2017-05-02 RX ADMIN — MONTELUKAST SODIUM 10 MG: 10 TABLET, FILM COATED ORAL at 21:47

## 2017-05-02 RX ADMIN — ASPIRIN 81 MG: 81 TABLET, CHEWABLE ORAL at 19:50

## 2017-05-02 NOTE — PROGRESS NOTES
Ate well for breakfast. Gyn oncology here to see pt, will hold chemo for now. Left for radiation treatment.

## 2017-05-02 NOTE — PROGRESS NOTES
[id number]: Slightly soft BPs (high 90s/50s), OVSS on RA. Denies pain and nausea this shift. Serial Na checks q6h, last level 126 from 127. R midline with D5W at 100mL/hr, L PIV SL. Voiding adequate amounts. No BM this shift. Resting between cares. Plan for possible radiation tx tomorrow, scheduled for 1430. Will continue to monitor and follow plan of care.

## 2017-05-02 NOTE — PROGRESS NOTES
Care Coordinator- Discharge Planning     Admission Date/Time:  4/30/2017  Attending MD:  Lazaro oGld MD     Data  Date of initial CC assessment:  5/2/17  Chart reviewed, discussed with interdisciplinary team.   Patient was admitted for:   1. Hyponatremia    2. Malignant neoplasm of cervix, unspecified site (H)    3. Heart failure, unspecified (H)    4. Essential hypertension, malignant    5. Old myocardial infarction         Assessment  Concerns with insurance coverage for discharge needs: None.  Current Living Situation: Patient staying with  at [facility] for chemo and radiation therapy over the next 5-6 weeks.  Support System: Supportive and Involved  Services Involved: Home Care  Transportation: Family or Friend will provide  Barriers to Discharge: chronically ill      Coordination of Care and Referrals: Provided patient/family with options for Home Care.  Spoke with patient at bedside.  Patient and  are staying at [facility] while patient is in town for daily radiation and chemotherapy.  Patient reports she will have another 3 day inpatient stay for internal radiation after her 5 weeks of OP XRT are complete.  Family is able to provide transportation home and to appointments.  Patient reports she is up independently and walks 3-4 days per week normally.  No discharge needs anticipated at this time.      Plan  Anticipated Discharge Date:  TBD  Anticipated Discharge Plan:  Back to [facility]    CTS Handoff completed:  NO    Cece Valdez RN, BSN  Care Coordinator [facility]  Pager: [phone]  Phone: [phone]

## 2017-05-02 NOTE — PLAN OF CARE
Problem: Individualization  Goal: Patient Preferences  Outcome: No Change  . Iv changed to saline pr order. Up for shower.

## 2017-05-02 NOTE — PROGRESS NOTES
[facility] [place]    Internal Medicine Progress Note - Gold Service      Assessment & Plan   Savannah Camacho is a 77 year old female admitted on 4/30/2017. She has a history of Stage IIB SCC of cervix, HTN, Grade 1 diastolic dysfunction who is admitted for hyponatremia.     Hyponatremia. Sodium of 114 on presentation to ED, s/p 1L IVF with improvement to 118. Likely hypovolemic due to poor oral intake and low urine output PTA. TSH wnl. On HCTZ PTA. Likely multifactorial due to poor PO intake w/ diuresis. AM cortisol is 30. No evidence of SIADH on urine studies. Sodium 127-128 in past 12hrs. Appears euvolemic on exam today and denies current symptoms.   - IV fluids changed to NS today  - Check sodium q6hrs (2000, 0200) and CMP in AM  - Goal is to keep rise of Na <9 in 24hr period  - Continue to hold HCTZ (BPs are soft)    Elevated LFTs. Stable today to AlkPhos 261, , and  . Abdominal US unremarkable. PET scan without liver mets. Denies previous liver disease, alcohol use, acetaminophen use, and hepatitis. Suspect likely 2/2 acute illness due to downtrend with stabilization.   - Trend CMP daily   - Check Hepatitis labs     Stage IIB SCC of Cervix. Follows with Oncology OP - currently undergoing radiation. Chemo on hold for hyponatremia. She plans to return to [facility] after hospitalization since she lives in [place]. Gyn/Onc following.   - Radiation today (day 2)  - Chemo has been held due to electrolyte abnormalities    Leukocytosis. WBC downtrending today to 11.6. Currently afebrile with stable vital signs. No acute complaints other than chronic dry cough. CXR unremarkable. BCx negative to date. Abdominal US unremarkable. UA without infection. Likely a stress response.  - Robitussin PRN for cough  - Trend CBC tomorrow    HTN. PTA on HCTZ, Coreg, and Lisinopril. BPs are stable ~110-120/60-80  - Continue Coreg  - Continue to hold Lisinopril and HCTZ given  soft BPs    Mild Persistent Asthma. No acute concerns. Cough is stable. O2 sat nl on RA. Continue singulair and PRN albuterol inhaler.    Moderate Tricuspid Regurgitation, Grade I LV Diastolic Dysfunction. Echo in 05/2016 with normal LV size, normal LV systolic function, mildly enlarged left atrium, grade 1 diastolic dysfunction and moderate tricuspid regurg. Previously followed with Cardiology for Takutsubo Cardiomyopathy with recs for PRN follow up. Appears compensated without acute concerns.     Pulmonary Nodule. Follows with Pulm at OSH, most recently seen in 11/2016 with plans for repeat CT in 1 year.     Vocal Cord Nodules. Reports previously being seen by ENT for nodules. Reports worsening hoarseness this admission which she states is likely due to stress. Recommend outpatient follow up with ENT.     HLD. Continue PTA Zocor    Resolved Hospital Problems.  Hypokalemia. Resolved. K 2.9 on admission, resolved with replacement. Currently 3.5.  ISABELLA. Baseline Cr is ~0.9, currently 0.87 wnl. Suspect ISABELLA was due to dehydration on admission.  - Continue to hold PTA Lisinopril and HCTZ given soft BPs  - Avoid nephrotoxic agents if possible    Diet: Regular Diet Adult  Fluids: NS at 75cc/hr  DVT Prophylaxis: Enoxaparin (Lovenox) SQ  Code Status: Full Code    Disposition Plan   Expected discharge: Possibly Tomorrow; recommended to [facility] once hyponatremia is resolved.     Entered: Godfrey Denson 05/02/2017, 3:59 PM   Information in the above section will display in the discharge planner report.      The patient's care was discussed with the Attending Physician, Dr. Gold, Bedside Nurse, Patient and Patient's .    Godfrey Denson  Internal Medicine Staff Hospitalist Service  [facility]  Pager: [phone]  Please see sticky note for cross cover information    Interval History     Patient reports feeling well today. She is sitting in the chair and states she has tried to be more active. She  reports hoarseness due to previously diagnosed nodules on vocal cords. She states this is often exacerbated during times of stress. Cough is stable. Reports she is urinating appropriately.    She denies fevers, chills, nausea, vomiting, chest pain, shortness of breath, abdominal pain, constipation, diarrhea, swelling, and new rashes.      Data reviewed today: I reviewed all medications, new labs and imaging results over the last 24 hours. I personally reviewed CBC, CMP   Physical Exam   Vital Signs: Temp: 98.3  F (36.8  C) Temp src: Axillary BP: 115/81   Heart Rate: 76 Resp: 20 SpO2: 95 % O2 Device: None (Room air)    Weight: 148 lbs 9.6 oz  General Appearance: Alert and Oriented x 3. NAD. MMM.   Respiratory: Effort normal on RA. Lungs CTAB. No wheezing, rales, rhonchi.   Cardiovascular: RRR. S1, S2. No murmurs noted.   GI: Abdomen soft, non-distended and non-tender. Bowel sounds present.  Skin: No jaundice. No rashes on exposed areas of skin.

## 2017-05-02 NOTE — PROGRESS NOTES
Gynecology Oncology Progress Note    Ms. Savannah Camacho is a 77 year old HD#3 for severe hyponatremia in the setting of newly diagnosed cervical cancer.    24 hour events:   - Slowly correcting hyponatremia    Subjective: Doing well this morning. Reports that her lightheadedness and dizziness is greatly improved. Happy she could get radiation therapy yesterday. Still reporting a bothersome cough, nonproductive.  Chewing gum helps with this.    Objective:   Vitals:    05/01/17 1609 05/01/17 1941 05/02/17 0000 05/02/17 0440   BP: 134/65 97/55 109/56 141/73   BP Location: Left arm Left arm Left arm Left arm   Resp: 16 18 20 20   Temp: 97.5  F (36.4  C) 98.1  F (36.7  C) 98.3  F (36.8  C) 97.8  F (36.6  C)   TempSrc: Oral Oral Oral Oral   SpO2: 99% 97% 99% 98%   Weight:       Height:         General: normal weight female, in NAD  CV: RRR, no m/r/g  Resp: CTAB    New labs/imaging-   5/1/2017 04:57 5/1/2017 07:49 5/1/2017 09:08 5/1/2017 15:55 5/1/2017 21:50   Sodium 122 (L) 124 (L)  127 (L) 126 (L)       Assessment: Savannah Camacho is a 77 year old with cervical cancer admitted for correction of severe hyponatremia.    Electrolyte correction per primary Medicine team.  GYN ONC will follow.    - Will proceed with daily radiation starting today  - Will hold chemotherapy until hyponatremia resolves    Nery Patel  OBGYN PGY2  Pager [phone]     Provider Disclosure:   I agree with above History, Review of Systems, Physical exam and Plan. I have reviewed the content of the documentation and have edited it as needed. I have personally performed the services documented here and the documentation accurately represents those services and the decisions I have made.     Electronically signed by:   Karina Owen MD   Gynecologic Oncology   [facility]

## 2017-05-02 NOTE — PLAN OF CARE
Problem: Fluid Volume Deficit (Adult)  Goal: Identify Related Risk Factors and Signs and Symptoms  Related risk factors and signs and symptoms are identified upon initiation of Human Response Clinical Practice Guideline (CPG)   Outcome: No Change  Pt afebrile, vitals stable, on room air.  Pt has a frequent non-productive cough.  D5W at 100cc/hr.  Voiding large amounts, small bowel movement.  Pt denies pain and nausea.  Regular diet.  Labs pending, including sodium.  Plan for 2nd radiation treatment today at 2:30.

## 2017-05-03 ENCOUNTER — APPOINTMENT (OUTPATIENT)
Dept: RADIATION ONCOLOGY | Facility: CLINIC | Age: 77
End: 2017-05-03
Attending: RADIOLOGY
Payer: MEDICARE

## 2017-05-03 ENCOUNTER — HOSPITAL ENCOUNTER (INPATIENT)
Facility: CLINIC | Age: 77
Setting detail: SURGERY ADMIT
End: 2017-05-03
Attending: RADIOLOGY | Admitting: RADIOLOGY
Payer: MEDICARE

## 2017-05-03 VITALS
BODY MASS INDEX: 25.37 KG/M2 | HEIGHT: 64 IN | OXYGEN SATURATION: 98 % | DIASTOLIC BLOOD PRESSURE: 71 MMHG | SYSTOLIC BLOOD PRESSURE: 132 MMHG | TEMPERATURE: 97.7 F | WEIGHT: 148.6 LBS | RESPIRATION RATE: 18 BRPM

## 2017-05-03 LAB
ALBUMIN SERPL-MCNC: 1.9 G/DL (ref 3.4–5)
ALP SERPL-CCNC: 219 U/L (ref 40–150)
ALT SERPL W P-5'-P-CCNC: 107 U/L (ref 0–50)
ANION GAP SERPL CALCULATED.3IONS-SCNC: 7 MMOL/L (ref 3–14)
AST SERPL W P-5'-P-CCNC: 82 U/L (ref 0–45)
BILIRUB SERPL-MCNC: 0.5 MG/DL (ref 0.2–1.3)
BUN SERPL-MCNC: 12 MG/DL (ref 7–30)
CALCIUM SERPL-MCNC: 8 MG/DL (ref 8.5–10.1)
CHLORIDE SERPL-SCNC: 97 MMOL/L (ref 94–109)
CO2 SERPL-SCNC: 28 MMOL/L (ref 20–32)
CREAT SERPL-MCNC: 0.92 MG/DL (ref 0.52–1.04)
ERYTHROCYTE [DISTWIDTH] IN BLOOD BY AUTOMATED COUNT: 15.1 % (ref 10–15)
GFR SERPL CREATININE-BSD FRML MDRD: 60 ML/MIN/1.7M2
GLUCOSE SERPL-MCNC: 95 MG/DL (ref 70–99)
HAV IGM SERPL QL IA: NORMAL
HBV SURFACE AB SERPL IA-ACNC: 0.09 M[IU]/ML
HBV SURFACE AG SERPL QL IA: NONREACTIVE
HCT VFR BLD AUTO: 30.3 % (ref 35–47)
HCV AB SERPL QL IA: NORMAL
HGB BLD-MCNC: 10.2 G/DL (ref 11.7–15.7)
MCH RBC QN AUTO: 29.8 PG (ref 26.5–33)
MCHC RBC AUTO-ENTMCNC: 33.7 G/DL (ref 31.5–36.5)
MCV RBC AUTO: 89 FL (ref 78–100)
PLATELET # BLD AUTO: 357 10E9/L (ref 150–450)
POTASSIUM SERPL-SCNC: 3.8 MMOL/L (ref 3.4–5.3)
PROT SERPL-MCNC: 6.2 G/DL (ref 6.8–8.8)
RBC # BLD AUTO: 3.42 10E12/L (ref 3.8–5.2)
SODIUM SERPL-SCNC: 132 MMOL/L (ref 133–144)
SODIUM SERPL-SCNC: 135 MMOL/L (ref 133–144)
SODIUM SERPL-SCNC: 136 MMOL/L (ref 133–144)
WBC # BLD AUTO: 9.7 10E9/L (ref 4–11)

## 2017-05-03 PROCEDURE — 25000128 H RX IP 250 OP 636: Performed by: PHYSICIAN ASSISTANT

## 2017-05-03 PROCEDURE — A9270 NON-COVERED ITEM OR SERVICE: HCPCS | Mod: GY | Performed by: PHYSICIAN ASSISTANT

## 2017-05-03 PROCEDURE — 84295 ASSAY OF SERUM SODIUM: CPT | Performed by: PHYSICIAN ASSISTANT

## 2017-05-03 PROCEDURE — 77386 ZZH IMRT TREATMENT DELIVERY, COMPLEX: CPT | Performed by: RADIOLOGY

## 2017-05-03 PROCEDURE — 40000802 ZZH SITE CHECK

## 2017-05-03 PROCEDURE — 85027 COMPLETE CBC AUTOMATED: CPT | Performed by: PHYSICIAN ASSISTANT

## 2017-05-03 PROCEDURE — 99207 ZZC APP CREDIT; MD BILLING SHARED VISIT: CPT | Performed by: PHYSICIAN ASSISTANT

## 2017-05-03 PROCEDURE — 80053 COMPREHEN METABOLIC PANEL: CPT | Performed by: PHYSICIAN ASSISTANT

## 2017-05-03 PROCEDURE — 25000132 ZZH RX MED GY IP 250 OP 250 PS 637: Mod: GY | Performed by: PHYSICIAN ASSISTANT

## 2017-05-03 PROCEDURE — 36415 COLL VENOUS BLD VENIPUNCTURE: CPT | Performed by: PHYSICIAN ASSISTANT

## 2017-05-03 PROCEDURE — 99232 SBSQ HOSP IP/OBS MODERATE 35: CPT | Mod: GC | Performed by: OBSTETRICS & GYNECOLOGY

## 2017-05-03 RX ADMIN — ENOXAPARIN SODIUM 40 MG: 40 INJECTION SUBCUTANEOUS at 01:18

## 2017-05-03 RX ADMIN — CARVEDILOL 6.25 MG: 6.25 TABLET, FILM COATED ORAL at 08:06

## 2017-05-03 NOTE — PROVIDER NOTIFICATION
Notified Resident at 2057 PM regarding lab results.      Spoke with: Gold team resident on-call    Orders were not obtained.    Comments: MD notified of pt's sodium level 129 & no orders to treat d/t level is treading up & pt. on NS at 75cc/hour.  Pt. Getting sodium levels q 6 hours.

## 2017-05-03 NOTE — PLAN OF CARE
Problem: Individualization  Goal: Patient Preferences  Outcome: Improving  AVSS, satting on RA.  Pt. denies pain or nausea.  Pt. Up with SBA.  Voiding spontaneously with good amounts, no stools this shift.  MIVF at 75cc/hour via PIV.  Sodium level 135 around 0200 & MD notified with no orders to treat.  Pt. slept well between cares  Pt. to get radiation treatment today.  Pt. slept well between cares.  Continue to monitor & treat per POC & notify team with changes.

## 2017-05-03 NOTE — DISCHARGE SUMMARY
[facility], [place]    Internal Medicine Discharge Summary- Gold Service    Date of Admission:  4/30/2017  Date of Discharge:  5/3/2017  Discharging Provider: Dr. Gold; Godfrey Denson PA-C [id number]  Discharge Team: Gold 2    Discharge Diagnoses   Hyponatremia   Stage IIB SCC of Cervix  HTN  Mild Persistent Asthma   Moderate Tricuspid Regurgitation  Grade I LV Diastolic Dysfunction  Pulmonary Nodule   Vocal Cord Nodule  HLD    Follow-ups Needed After Discharge   Follow up with Gyn/Onc team on 5/5/17 as scheduled for BP and labs (ordered)  Radiation M-F as scheduled; Chemo per Oncology team  Follow up with ENT PRN   Follow up with Cardiologist as scheduled    Hospital Course   Savannah Camacho was admitted on 4/30/2017 for hyponatremia.  The following problems were addressed during her hospitalization:    Hyponatremia. Sodium of 114 on presentation to ED. Thought to be hypovolemic due to poor oral intake and low urine output PTA with ISABELLA. PTA HCTZ and and Lisinopril were held this admission with recs for outpatient BP monitoring. No evidence of SIADH on urine studies. Sodium wnl on discharge after 2 days of IVF. Plan for CMP recheck on 5/5/17.     Elevated LFTs. Found on admission, and continued to downtrend during hospitalization. Abdominal US unremarkable, Hepatitis A, B, C negative. PET scan without liver mets. Denies previous liver disease, alcohol use, acetaminophen use, and hepatitis. Check CMP on 5/5/17 to ensure downtrend. May consider Hepatology f/u if significantly elevated.     Stage IIB SCC of Cervix. Follows with Oncology OP - currently undergoing radiation this admission. Chemo on hold for hyponatremia. Gyn/Onc followed patient during this hospitalization. F/u scheduled 5/5/17.     Leukocytosis. WBC elevated on admission with subsequent downtrend to normal limits as hyponatremia resolved. CXR unremarkable, BCx negative to date, asymptomatic in terms of infectious etiology.  Afebrile. Suspect likely a stress response.     HTN. PTA on HCTZ, Coreg, and Lisinopril. BPs are stable ~110-120s/60-80s. Continued on Coreg this admission. Held Lisinopril and HCTZ given soft BPs and ISABELLA.      Mild Persistent Asthma. No acute concerns. O2 sat nl on RA. Continued on singulair and PRN albuterol inhaler.     Moderate Tricuspid Regurgitation, Grade I LV Diastolic Dysfunction. Echo in 05/2016 with normal LV size, normal LV systolic function, mildly enlarged left atrium, grade 1 diastolic dysfunction and moderate tricuspid regurg. Previously followed with Cardiology for [place]o Cardiomyopathy with recs for PRN follow up. Appears compensated without acute concerns.      Pulmonary Nodule. Follows with Pulm at OSH, most recently seen in 11/2016 with plans for repeat CT in 1 year.      Vocal Cord Nodules. Reports previously being seen by ENT for nodules. Reports worsening hoarseness this admission which she states is likely due to stress. Recommend outpatient follow up with ENT (pt will schedule).      HLD. Continue PTA Zocor     Resolved Hospital Problems.  Hypokalemia. Resolved. K 2.9 on admission, resolved with replacement. Currently wnl.  ISABELLA. Baseline Cr is ~0.9, currently wnl. Suspect ISABELLA was due to hypovolemia on admission.    Consultations This Hospital Stay   Gyn/Onc Consult    Code Status   Full Code    Time Spent on this Encounter   I, Godfrey Denson, personally saw the patient today and spent greater than 30 minutes discharging this patient.       Godfrey Denson  Internal Medicine Staff Hospitalist Service  [facility]  Pager: [phone]  ______________________________________________________________________    Physical Exam   Vital Signs: Temp: 97.6  F (36.4  C) Temp src: Oral BP: 128/67   Heart Rate: 70 Resp: 20 SpO2: 96 % O2 Device: None (Room air)    Weight: 148 lbs 9.6 oz    General Appearance: Alert and oriented x 3. NAD. Pleasant and conversant.   Respiratory: Effort  normal on RA. Lungs CTAB with no wheezing, rales, or rhonchi.   Cardiovascular: RRR. S1, S2. No murmurs appreciated.   GI: Abdomen soft, and non-distended, BS present. No tenderness, rebound or guarding.   Skin: No jaundice or rashes.   Other: No swelling in extremities.       Significant Results and Procedures   Results for orders placed or performed during the hospital encounter of 04/30/17   XR Chest 2 Views    Narrative    Exam: XR CHEST 2 VW, 4/30/2017 10:34 PM    Indication: dyspnea, r/o PNA or other pathology    Comparison: No prior chest x-rays. PET/CT 4/20/2017    Findings:   Elevated left hemidiaphragm, somewhat greater than on 4/20/2017.    No focal airspace opacity. Atelectasis at the right costophrenic  angle. No pleural effusion. Heart size is normal. Aortic arch  calcifications. Central airway is clear.      Impression    Impression:   No acute airspace disease.    I have personally reviewed the examination and initial interpretation  and I agree with the findings.    JAZZ GLASS MD   US Abdomen Complete w Doppler Complete    Narrative    Examination:  US ABDOMEN COMPLETE WITH DOPPLER COMPLETE 5/1/2017 9:08  AM     Comparison: CT chest/abdomen/pelvis dated 4/13/2017    History: ISABELLA eval for hydronephrosis, Abnormal LFTs eval for biliary  abnormality    Findings:   There is no ascites. Small right pleural effusion. The visualized  aorta and IVC are unremarkable.    LIVER:  The liver parenchyma, portal venous system, hepatic venous  system, and bile ducts have normal size and echotexture. There are no  focal masses. The liver is 12.7 cm in craniocaudal dimension. There is  no intrahepatic biliary dilatation.    GALL BLADDER:  There was a negative sonographic Mccallum's sign. The  gallbladder lumen is anechoic and nondistended, the wall measuring 2  mm is not thickened, and no shadowing is visualized. The extrahepatic  bile duct measures 5 mm. There is no extrahepatic biliary dilatation.  There is no  pericystic fluid.    PANCREAS:  The visualized pancreas has normal echogenicity.    KIDNEY: The right kidney measures 12.3 cm. The left kidney measures  11.7 cm. Both kidneys are normal in size and position. There is no  hydronephrosis or hydroureter. No cystic lesion or mass demonstrated.  Parenchyma demonstrates normal echotexture.      SPLEEN:  The spleen measures 9.7 cm and has normal echotexture.    DOPPLER:  There is flow towards the liver in the portal venous system.  The diameter of the main portal vein is 1 cm.     Flow is:  29.6 cm/sec in the main portal vein.   18.9 cm/sec in the left portal vein.  27.5 cm/sec in the right portal vein.    Flow in the hepatic artery is towards the liver and:  134.1 cm/sec peak systolic  31.9 cm/sec minimum diastolic flow   0.76 resistive index     The left, middle, and right hepatic veins are patent with flow towards  the IVC.     The mid splenic vein is patent and flow is towards the liver.     The IVC is patent with flow towards the heart.      Impression    Impression:   1.  Unremarkable abdominal ultrasound. Normal Hepatic Doppler wave  form.  2.  Small right pleural effusion.          I have personally reviewed the examination and initial interpretation  and I agree with the findings.    JAZZ GLASS MD       Pending Results   These results will be followed up by Gyn/Onc service  Unresulted Labs Ordered in the Past 30 Days of this Admission     Date and Time Order Name Status Description    4/30/2017 2201 Blood culture Preliminary              Primary Care Physician   Adán Lanier    Discharge Disposition   Discharged to [facility]  Condition at discharge: Good    Discharge Orders   No discharge procedures on file.  Discharge Medications   Current Discharge Medication List      START taking these medications    Details   LORazepam (ATIVAN) 1 MG tablet Take 1 tablet (1 mg) by mouth every 6 hours as needed (nausea/vomiting, anxiety or sleep )  Qty: 30 tablet,  Refills: 1    Associated Diagnoses: Malignant neoplasm of exocervix (H)      prochlorperazine (COMPAZINE) 10 MG tablet Take 1 tablet (10 mg) by mouth every 6 hours as needed (nausea/vomiting)  Qty: 30 tablet, Refills: 2    Associated Diagnoses: Malignant neoplasm of exocervix (H)         CONTINUE these medications which have NOT CHANGED    Details   Montelukast Sodium (SINGULAIR PO) Take 10 mg by mouth once       HYDROCHLOROTHIAZIDE PO Take 12.5 mg by mouth daily      LISINOPRIL PO Take 5 mg by mouth 2 times daily       SIMVASTATIN PO Take 40 mg by mouth once       Carvedilol (COREG PO) Take 6.25 mg by mouth 2 times daily (with meals)       albuterol (PROAIR HFA/PROVENTIL HFA/VENTOLIN HFA) 108 (90 BASE) MCG/ACT Inhaler Inhale 2 puffs into the lungs every 6 hours Reported on 4/20/2017      Multiple Vitamins-Minerals (MULTIVITAMIN ADULT PO) Take by mouth daily       ASPIRIN PO Take 81 mg by mouth daily            Allergies   Allergies   Allergen Reactions     Amoxicillin Rash

## 2017-05-03 NOTE — PROGRESS NOTES
"CLINICAL NUTRITION SERVICES - BRIEF NOTE    Met with patient to discuss admission screen marked as \"reduced oral intake x 1 month\".  Patient denied having poor appetite although notes her intake is slightly decreased, however isn't decreased to the point where she is worried about it.  Does, however, have questions about what to do with her diet given her hyponatremia situation.  She notes that she has been trying to order really salty items to help increase her sodium level while at the hospital.     MALNUTRITION  Patient does not meet two of the above criteria necessary for diagnosing malnutrition but is at risk for malnutrition    Interventions:  Spoke with PA on team, patient will not require fluid restriction or sodium restriction as she has hypovolemic hyponatremia.  PA will discuss with patient.     Discussed use of oral supplements as needed if appetite/PO intake decline with ongoing cancer treatment.     Gladys Menezes MS, RD, LD  Pager [phone]        "

## 2017-05-03 NOTE — PROGRESS NOTES
Gynecology Oncology Progress Note    Ms. Savannah Camacho is a 77 year old HD#4 for severe hyponatremia in the setting of newly diagnosed cervical cancer.    24 hour events:   - Slowly correcting hyponatremia    Subjective:     Objective:   Vitals:    05/02/17 1140 05/02/17 1545 05/02/17 1927 05/02/17 2258   BP: 112/60 115/81 124/52 119/79   BP Location: Left arm Left arm Left arm Left arm   Resp: 20 20 20 20   Temp: 98.2  F (36.8  C) 98.3  F (36.8  C) 98.3  F (36.8  C) 98.1  F (36.7  C)   TempSrc: Oral Axillary Oral Oral   SpO2: 96% 95% 95% 96%   Weight:       Height:         General: normal weight female, in NAD  CV: RRR, no m/r/g  Resp: CTAB    New labs/imaging-   5/2/2017 04:47 5/2/2017 12:54 5/2/2017 19:45 5/3/2017 02:05   Sodium 127 (L) 128 (L) 129 (L) 135       Assessment: Savannah Camacho is a 77 year old with cervical cancer admitted for correction of severe hyponatremia.    Electrolyte correction per primary Medicine team.  GYN ONC will follow.    - Will proceed with daily radiation while in house  - Will hold chemotherapy until hyponatremia resolves    Nery MAURO PGY2  Pager [phone]       Provider Disclosure:   I agree with above History, Review of Systems, Physical exam and Plan. I have reviewed the content of the documentation and have edited it as needed. I have personally performed the services documented here and the documentation accurately represents those services and the decisions I have made.     Electronically signed by:   Pasquale Craig MD   Gynecologic Oncology   [facility]

## 2017-05-03 NOTE — PROGRESS NOTES
DISCHARGE  D:  Patient with orders to discharge to [facility] accompanied by .  AVSS and denies pain.  Sodium guaplp=270, 136  Patient up independently, voiding, passing flatus, no bowel movement reported today, and tolerating diet with good appetite.  Patient had radiation today.  Patient with all personal belongings.  I:  Scheduled meds given as ordered.  Discharge instructions and medications reviewed at length with patient and copies given to patient.  PIV and midline dc'd.  A:  Patient stable and ready for discharge.  Patient verbalizes understanding of discharge instructions and medications without further question.  P:  Patient to follow-up as ordered in discharge instructions.

## 2017-05-04 ENCOUNTER — APPOINTMENT (OUTPATIENT)
Dept: RADIATION ONCOLOGY | Facility: CLINIC | Age: 77
End: 2017-05-04
Attending: RADIOLOGY
Payer: MEDICARE

## 2017-05-04 ENCOUNTER — CARE COORDINATION (OUTPATIENT)
Dept: CARE COORDINATION | Facility: CLINIC | Age: 77
End: 2017-05-04

## 2017-05-04 VITALS — BODY MASS INDEX: 25.97 KG/M2 | WEIGHT: 151.3 LBS

## 2017-05-04 DIAGNOSIS — C53.9 MALIGNANT NEOPLASM OF CERVIX, UNSPECIFIED SITE (H): ICD-10-CM

## 2017-05-04 DIAGNOSIS — C53.1 MALIGNANT NEOPLASM OF EXOCERVIX (H): Primary | ICD-10-CM

## 2017-05-04 DIAGNOSIS — E87.1 HYPONATREMIA: Primary | ICD-10-CM

## 2017-05-04 PROCEDURE — 77386 ZZH IMRT TREATMENT DELIVERY, COMPLEX: CPT | Performed by: RADIOLOGY

## 2017-05-04 NOTE — PROGRESS NOTES
[facility] PHYSICIANS  SPECIALIZING IN BREAKTHROUGHS  Radiation Oncology    On Treatment Visit Note      Savannah Camacho      Date: 2017   MRN: [id number]   : 1940  Diagnosis: Cervical cancer      Reason for Visit:  On Radiation Treatment Visit     Treatment Summary to Date  Treatment Site: pelvis Current Dose: 720/4500 cGy Fractions:  + 5 T&R      Chemotherapy  Chemo concurrent with radx?: Yes  Oncologist: Dr. Raines  Drug Name/Frequency 1: Cisplatin    Subjective:   Savannah was admitted the day before treatment initiation due to dizziness.  She was admitted due to hyponatremia and discharged on 5/3.  She did not receive cisplatin, but did seem to tolerate radiotherapy well.  She states that she noted some blood after a bowel movement last night, and again noted a small amount of blood staining her underwear.  This has not recurred.      Nursing ROS:   Nutrition Alteration  Diet Type: Patient's Preference  Nutrition Note: poor appetite  Skin  Skin Reaction: 0 - No changes              Genitourinary   Note: No dilator given at this time  Psychosocial  Pyschosocial Note: feeling very tired  Pain Assessment  0-10 Pain Scale: 0      Objective:   Wt 68.6 kg (151 lb 4.8 oz)  BMI 25.97 kg/m2  Gen: Appears well, in no acute distress  Skin: No erythema.  External hemorrhoids, not inflamed.      Labs:  CBC RESULTS:   Recent Labs   Lab Test  17   0700   WBC  9.7   RBC  3.42*   HGB  10.2*   HCT  30.3*   MCV  89   MCH  29.8   MCHC  33.7   RDW  15.1*   PLT  357     ELECTROLYTES:  Recent Labs   Lab Test  17   1353  17   0700   NA  136  132*   POTASSIUM   --   3.8   CHLORIDE   --   97   PEPE   --   8.0*   CO2   --   28   BUN   --   12   CR   --   0.92   GLC   --   95       Assessment:    Tolerating radiation therapy well.  All questions and concerns addressed.    Toxicities:  Diarrhea: Grade 0: No toxicity  Urinary frequency: Grade 0: No toxicity  Urinary tract pain: Grade 0: No  toxicity  Urinary urgency: Grade 0: No toxicity    Plan:   1. Continue current therapy.    2. She will have lab tomorrow.  3. Origin of blood could be rectal or vaginal. Appear to be minimum.  Will continue to monitor.      Mosaiq chart and setup information reviewed  MVCT/IGRT images checked    Medication Review  Med list reviewed with patient?: Yes    Educational Topic Discussed  Additional Instructions: Labs tomorrow prior to chemo next week  Education Instructions: reviewed        Michelle Carrion MD/PhD  [phone] clinic  Pager [phone]    Please do not send letter to referring physician.

## 2017-05-04 NOTE — MR AVS SNAPSHOT
After Visit Summary   5/4/2017    Savannah Camacho    MRN: [id number]           Patient Information     Date Of Birth          1940        Visit Information        Provider Department      5/4/2017 2:45 PM Michelle Carrion MD Radiation Oncology Clinic        Today's Diagnoses     Malignant neoplasm of exocervix (H)    -  1       Follow-ups after your visit        Your next 10 appointments already scheduled     May 05, 2017  2:30 PM CDT   EXTERNAL RADIATION TREATMENT with [facility] RAD ONC VARIAN   Radiation Oncology Clinic ([facility])    [facility]  1st Floor  [address] [facility] [address]   [phone]            May 05, 2017  3:00 PM CDT   LAB with [facility] ([facility])    [address]  1st Floor  [facility] [address] [phone]           Patient must bring picture ID.  Patient should be prepared to give a urine specimen  Please do not eat 10-12 hours before your appointment if you are coming in fasting for labs on lipids, cholesterol, or glucose (sugar).  Pregnant women should follow their Care Team instructions. Water with medications is okay. Do not drink coffee or other fluids.   If you have concerns about taking  your medications, please ask at office or if scheduling via [employer], send a message by clicking on Secure Messaging, Message Your Care Team.            May 05, 2017  3:20 PM CDT   (Arrive by 3:05 PM)   Return Active Treatment with CASPER Arias [facility] ([facility] [place])    [address]  2nd Floor  [facility] [address] [phone]            May 08, 2017  2:30 PM CDT   EXTERNAL RADIATION TREATMENT with [facility] RAD ONC VARIAN   Radiation Oncology Clinic ([facility])    [facility]  1st Floor  [address] [facility] [address]   [phone]            May 09, 2017  6:30 AM  CDT   [facility] Lab Draw with [facility] LAB DRAW   [facility] Lab Draw ([facility])    [address]  2nd Floor  [facility] [address]   [phone]            May 09, 2017  7:00 AM CDT   Infusion 360 with [facility] ONCOLOGY INFUSION, UC 10 ATC   [facility] ([facility])    [address]  2nd Floor  [facility] [address]   [phone]            May 09, 2017  2:30 PM CDT   EXTERNAL RADIATION TREATMENT with [facility] RAD ONC VARIAN   Radiation Oncology Clinic ([facility])    [facility]  1st Floor  [address]  [facility] [address]   [phone]            May 10, 2017  2:30 PM CDT   EXTERNAL RADIATION TREATMENT with [facility] RAD ONC VARIAN   Radiation Oncology Clinic ([facility])    [facility]  1st Floor  [address] [facility] [address]   [phone]            May 11, 2017  2:30 PM CDT   EXTERNAL RADIATION TREATMENT with [facility] RAD ONC VARIAN   Radiation Oncology Clinic ([facility])    [facility]  1st Floor  [address] [facility] [address]   [phone]              Who to contact     Please call your clinic at [phone] to:    Ask questions about your health    Make or cancel appointments    Discuss your medicines    Learn about your test results    Speak to your doctor   If you have compliments or concerns about an experience at your clinic, or if you wish to file a complaint, please contact [facility] Patient Relations at [phone] or email us at [email or web address]         Additional Information About Your Visit        [employer] Information     [employer] is an electronic gateway that provides easy, online access to your medical records. With [employer], you can request a clinic appointment, read your test results, renew a prescription or communicate with your  care team.     To sign up for MyChart visit the website at [email or web address]   You will be asked to enter the access code listed below, as well as some personal information. Please follow the directions to create your username and password.     Your access code is: [email or web address]  Expires: 2017 11:20 AM     Your access code will  in 90 days. If you need help or a new code, please contact your [facility] or call [phone] for assistance.        Care EveryWhere ID     This is your Care EveryWhere ID. This could be used by other organizations to access your [facility] medical records  [id number]        Your Vitals Were     BMI (Body Mass Index)                   25.97 kg/m2            Blood Pressure from Last 3 Encounters:   17 132/71   17 135/76   17 155/88    Weight from Last 3 Encounters:   17 68.6 kg (151 lb 4.8 oz)   17 67.4 kg (148 lb 9.6 oz)   17 64.7 kg (142 lb 9.6 oz)              Today, you had the following     No orders found for display       Primary Care Provider Office Phone # Fax #    Adán Lanier [phone]       [address] [place] [facility] [address]        Thank you!     Thank you for choosing RADIATION ONCOLOGY CLINIC  for your care. Our goal is always to provide you with excellent care. Hearing back from our patients is one way we can continue to improve our services. Please take a few minutes to complete the written survey that you may receive in the mail after your visit with us. Thank you!             Your Updated Medication List - Protect others around you: Learn how to safely use, store and throw away your medicines at www.disposemymeds.org.          This list is accurate as of: 17  2:49 PM.  Always use your most recent med list.                   Brand Name Dispense Instructions for use    albuterol 108 (90 BASE) MCG/ACT Inhaler    PROAIR HFA/PROVENTIL  HFA/VENTOLIN HFA     Inhale 2 puffs into the lungs every 6 hours Reported on 4/20/2017       ASPIRIN PO      Take 81 mg by mouth daily       COREG PO      Take 6.25 mg by mouth 2 times daily (with meals)       LORazepam 1 MG tablet    ATIVAN    30 tablet    Take 1 tablet (1 mg) by mouth every 6 hours as needed (nausea/vomiting, anxiety or sleep )       MULTIVITAMIN ADULT PO      Take by mouth daily       prochlorperazine 10 MG tablet    COMPAZINE    30 tablet    Take 1 tablet (10 mg) by mouth every 6 hours as needed (nausea/vomiting)       SIMVASTATIN PO      Take 40 mg by mouth once       SINGULAIR PO      Take 10 mg by mouth once

## 2017-05-04 NOTE — PROGRESS NOTES
Patient has clinic visit within 24-48 hours of Discharge so no post DC follow up call is needed          May 05, 2017 3:20 PM CDT   (Arrive by 3:05 PM)   Return Active Treatment with CASPER Arias CNP [facility] ([facility])     [address] [place] [address] [phone]

## 2017-05-04 NOTE — LETTER
2017       RE: Savannah Camacho  [address] [facility] [address]     Dear Colleague,    Thank you for referring your patient, Savannah Camacho, to the RADIATION ONCOLOGY CLINIC. Please see a copy of my visit note below.    [facility] PHYSICIANS  SPECIALIZING IN BREAKTHROUGHS  Radiation Oncology    On Treatment Visit Note      Savannah Camacho      Date: 2017   MRN: [id number]   : 1940  Diagnosis: Cervical cancer      Reason for Visit:  On Radiation Treatment Visit     Treatment Summary to Date  Treatment Site: pelvis Current Dose: 720/4500 cGy Fractions:  + 5 T&R      Chemotherapy  Chemo concurrent with radx?: Yes  Oncologist: Dr. Raines  Drug Name/Frequency 1: Cisplatin    Subjective:   Savannah was admitted the day before treatment initiation due to dizziness.  She was admitted due to hyponatremia and discharged on 5/3.  She did not receive cisplatin, but did seem to tolerate radiotherapy well.  She states that she noted some blood after a bowel movement last night, and again noted a small amount of blood staining her underwear.  This has not recurred.      Nursing ROS:   Nutrition Alteration  Diet Type: Patient's Preference  Nutrition Note: poor appetite  Skin  Skin Reaction: 0 - No changes              Genitourinary   Note: No dilator given at this time  Psychosocial  Pyschosocial Note: feeling very tired  Pain Assessment  0-10 Pain Scale: 0      Objective:   Wt 68.6 kg (151 lb 4.8 oz)  BMI 25.97 kg/m2  Gen: Appears well, in no acute distress  Skin: No erythema.  External hemorrhoids, not inflamed.      Labs:  CBC RESULTS:   Recent Labs   Lab Test  17   0700   WBC  9.7   RBC  3.42*   HGB  10.2*   HCT  30.3*   MCV  89   MCH  29.8   MCHC  33.7   RDW  15.1*   PLT  357     ELECTROLYTES:  Recent Labs   Lab Test  17   1353  17   0700   NA  136  132*   POTASSIUM   --   3.8   CHLORIDE   --   97   PEPE   --   8.0*   CO2   --   28   BUN   --   12   CR   --   0.92    GLC   --   95       Assessment:    Tolerating radiation therapy well.  All questions and concerns addressed.    Toxicities:  Diarrhea: Grade 0: No toxicity  Urinary frequency: Grade 0: No toxicity  Urinary tract pain: Grade 0: No toxicity  Urinary urgency: Grade 0: No toxicity    Plan:   1. Continue current therapy.    2. She will have lab tomorrow.  3. Origin of blood could be rectal or vaginal. Appear to be minimum.  Will continue to monitor.      Mosaiq chart and setup information reviewed  MVCT/IGRT images checked    Medication Review  Med list reviewed with patient?: Yes    Educational Topic Discussed  Additional Instructions: Labs tomorrow prior to chemo next week  Education Instructions: reviewed        Michelle Carrion MD/PhD  [phone] clinic  Pager [phone]    Please do not send letter to referring physician.      Again, thank you for allowing me to participate in the care of your patient.      Sincerely,    Michelle Carrion MD

## 2017-05-04 NOTE — LETTER
Date:May 5, 2017      Provider requested that no letter be sent. Do not send.       [facility] Information

## 2017-05-05 ENCOUNTER — ONCOLOGY VISIT (OUTPATIENT)
Dept: ONCOLOGY | Facility: CLINIC | Age: 77
End: 2017-05-05
Attending: NURSE PRACTITIONER
Payer: MEDICARE

## 2017-05-05 ENCOUNTER — APPOINTMENT (OUTPATIENT)
Dept: RADIATION ONCOLOGY | Facility: CLINIC | Age: 77
End: 2017-05-05
Attending: RADIOLOGY
Payer: MEDICARE

## 2017-05-05 VITALS
SYSTOLIC BLOOD PRESSURE: 144 MMHG | BODY MASS INDEX: 25.66 KG/M2 | HEIGHT: 64 IN | DIASTOLIC BLOOD PRESSURE: 73 MMHG | HEART RATE: 80 BPM | RESPIRATION RATE: 16 BRPM | OXYGEN SATURATION: 97 % | TEMPERATURE: 98.8 F | WEIGHT: 150.3 LBS

## 2017-05-05 DIAGNOSIS — C53.9 MALIGNANT NEOPLASM OF CERVIX, UNSPECIFIED SITE (H): Primary | ICD-10-CM

## 2017-05-05 DIAGNOSIS — Z51.11 ENCOUNTER FOR ANTINEOPLASTIC CHEMOTHERAPY: ICD-10-CM

## 2017-05-05 DIAGNOSIS — R79.89 ELEVATED LFTS: ICD-10-CM

## 2017-05-05 DIAGNOSIS — I10 BENIGN ESSENTIAL HYPERTENSION: ICD-10-CM

## 2017-05-05 DIAGNOSIS — D64.9 ANEMIA, UNSPECIFIED TYPE: ICD-10-CM

## 2017-05-05 DIAGNOSIS — R74.8 ELEVATED LIVER ENZYMES: ICD-10-CM

## 2017-05-05 DIAGNOSIS — Z09 HOSPITAL DISCHARGE FOLLOW-UP: ICD-10-CM

## 2017-05-05 DIAGNOSIS — E87.1 HYPONATREMIA: ICD-10-CM

## 2017-05-05 LAB
ALBUMIN SERPL-MCNC: 2.5 G/DL (ref 3.4–5)
ALP SERPL-CCNC: 192 U/L (ref 40–150)
ALT SERPL W P-5'-P-CCNC: 89 U/L (ref 0–50)
ANION GAP SERPL CALCULATED.3IONS-SCNC: 7 MMOL/L (ref 3–14)
AST SERPL W P-5'-P-CCNC: 44 U/L (ref 0–45)
BILIRUB SERPL-MCNC: 0.5 MG/DL (ref 0.2–1.3)
BUN SERPL-MCNC: 17 MG/DL (ref 7–30)
CALCIUM SERPL-MCNC: 8.1 MG/DL (ref 8.5–10.1)
CHLORIDE SERPL-SCNC: 99 MMOL/L (ref 94–109)
CO2 SERPL-SCNC: 28 MMOL/L (ref 20–32)
CREAT SERPL-MCNC: 0.89 MG/DL (ref 0.52–1.04)
ERYTHROCYTE [DISTWIDTH] IN BLOOD BY AUTOMATED COUNT: 15.1 % (ref 10–15)
GFR SERPL CREATININE-BSD FRML MDRD: 62 ML/MIN/1.7M2
GLUCOSE SERPL-MCNC: 92 MG/DL (ref 70–99)
HCT VFR BLD AUTO: 30.3 % (ref 35–47)
HGB BLD-MCNC: 9.8 G/DL (ref 11.7–15.7)
MCH RBC QN AUTO: 30 PG (ref 26.5–33)
MCHC RBC AUTO-ENTMCNC: 32.3 G/DL (ref 31.5–36.5)
MCV RBC AUTO: 93 FL (ref 78–100)
PLATELET # BLD AUTO: 451 10E9/L (ref 150–450)
POTASSIUM SERPL-SCNC: 4.4 MMOL/L (ref 3.4–5.3)
PROT SERPL-MCNC: 7 G/DL (ref 6.8–8.8)
RBC # BLD AUTO: 3.27 10E12/L (ref 3.8–5.2)
SODIUM SERPL-SCNC: 135 MMOL/L (ref 133–144)
WBC # BLD AUTO: 7.6 10E9/L (ref 4–11)

## 2017-05-05 PROCEDURE — 77386 ZZH IMRT TREATMENT DELIVERY, COMPLEX: CPT | Performed by: RADIOLOGY

## 2017-05-05 PROCEDURE — 99214 OFFICE O/P EST MOD 30 MIN: CPT | Mod: ZP | Performed by: NURSE PRACTITIONER

## 2017-05-05 PROCEDURE — 99212 OFFICE O/P EST SF 10 MIN: CPT | Mod: ZF

## 2017-05-05 PROCEDURE — 77336 RADIATION PHYSICS CONSULT: CPT | Performed by: RADIOLOGY

## 2017-05-05 RX ORDER — HYDROCHLOROTHIAZIDE 25 MG/1
TABLET ORAL DAILY
COMMUNITY
Start: 2017-03-16

## 2017-05-05 RX ORDER — LISINOPRIL 5 MG/1
5 TABLET ORAL 2 TIMES DAILY
COMMUNITY
Start: 2017-03-16

## 2017-05-05 ASSESSMENT — PAIN SCALES - GENERAL: PAINLEVEL: NO PAIN (0)

## 2017-05-05 NOTE — PATIENT INSTRUCTIONS
Restart your home doses of Coreg and lisinopril. Continue to hold the HCTZ but let us know if you start having more fluid retention. Continue to hold the simvastatin- we'll restart this when your liver numbers become closer to normal.  Take in 64oz of fluids daily- try to increase the amount fluids that contain sodium such as Gatorade, Pedialyte, broth, and V8 juice.

## 2017-05-05 NOTE — PROGRESS NOTES
Gynecologic Oncology Follow-Up Note  RE: Savannah Camacho  MRN: [id number]  : 1940  Date of Visit: 2017    CC: Savannah Camacho is a 77 year old year old female with cervical cancer (clinical stage IIA2, radiographic stage IIB) who presents today for follow up regarding recent hospitalization and disease management.    HPI: Savannah comes to the clinic accompanied by her  Anil. She's been feeling well since her hospital discharge with no further episodes of dizziness or confusion. She has been off her lisinopril, HCTZ, and simvastatin- she states she has noticed more swelling in her ankles and is concerned about her blood pressure today. Due to her cardiac history, she was told she was supposed to keep her blood pressure as low as she can tolerate it. She still notes small amounts of brown vaginal discharge. She also notes fatigue and night sweats. She is pleased to be feeling better and feels ready to start treatment next week.    Oncology History:  She initially sought care from a gynecologist in 2012 at which time she was noted to have a plaque of her vagina which was biopsied and found to be hyperkeratosis. She was thus treated with steroids and kenolog with substantial improvement in her symptoms. She then had a pap smear with ASCUS, HPV negative in . Most recently she has developed increasing, foul smelling vaginal discharge and again sought care from her gynecologist who rightfully took her to the OR for EUA and biopsies. On EUA she was found to have a confluent area of lesions in the proximal vagina mostly confined to the anterior wall. This area measured approximately 2 x 3 cm and appeared to have a whitish appearance. There was extension into the right vaginal fornix, where there was an area of necrosis appreciated. Again, the cervix was not able to be visualized on exam. Biopsies were taken of the right vaginal wall and posterior vaginal wall. Both of these were  positive for well-differentiated squamous cell carcinoma.      3/10/17:  Upper vaginal biopsy: Well differentiated squamous cell carcinoma     4/19/17: MRI Pelvis: Large exophytic heterogeneously enhancing with necrotic areas is seen arising from the upper vagina/fornix on the right side measuring 3.4 x 3.6 x 4 cm. Superiorly the lesion appears to involve the right lateral aspect of the cervix with extension into the parametrium. No extension into the cervical canal. No definite extension into the uterus. Posteriorly there is involvement of the posterior wall of the upper vagina with extension across the midline to the left side. The lesion also abuts the midportion of the rectum with possible involvement. Thrombosis of a right parametrial venous structure is noted. A prominent right internal iliac lymph node measures 5 mm in short axis. Small amount of free fluid is noted in the pelvis. Visualized bowel loops otherwise appear unremarkable. Pelvic musculature is of normal appearance. No suspicious osseous lesions.     4/20/17: PET/CT:   IMPRESSION: In this patient with malignant neoplasm of exocervix:  1. Cervical cancer arising from the right upper cervix, involving the  upper vagina corresponds to known malignancy, better characterized on  4/19/2017.  2. There are several mildly hypermetabolic lymph nodes in the pelvis  including right internal iliac chain, right common iliac chain and  left external iliac chain which he be treated as metastatic disease  under proven otherwise.   3. Nodular thickening in the right lung apex, likely presents fibrosis  or scarring. Recommend follow-up chest CT to ensure stability    4/30- 5/3/17: Presented to ED with lightheadedness, found to be significantly hyponatremic with Na 114. Hospitalized, sodium corrected. Thought to be due to hypovolemia. Chemoradiation deferred while inpatient.      Past Medical History:   Diagnosis Date     Asthma      Cervical cancer (H)       Congestive heart failure (CHF) (H)      Hyperlipidemia      Hypertension      Lung nodule     Long standing Rt apical lung nodule     MI (myocardial infarction) (H) 2009    involving anterior wall        Past Surgical History:   Procedure Laterality Date     BIOPSY OF VAGINA,SIMPLE  02/2017    also bx from 2012,      COLONOSCOPY       HEART CATH STENT COR W/WO PTCA  2009       Current Outpatient Prescriptions   Medication     LORazepam (ATIVAN) 1 MG tablet     prochlorperazine (COMPAZINE) 10 MG tablet     Montelukast Sodium (SINGULAIR PO)     SIMVASTATIN PO     Carvedilol (COREG PO)     albuterol (PROAIR HFA/PROVENTIL HFA/VENTOLIN HFA) 108 (90 BASE) MCG/ACT Inhaler     Multiple Vitamins-Minerals (MULTIVITAMIN ADULT PO)     ASPIRIN PO     No current facility-administered medications for this visit.           Allergies   Allergen Reactions     Amoxicillin Rash       Family History   Problem Relation Age of Onset     CANCER Other      Daughter had Breast Cancer at age 39       Social History     Social History     Marital status:      Spouse name: N/A     Number of children: N/A     Years of education: N/A     Occupational History     Not on file.     Social History Main Topics     Smoking status: Never Smoker     Smokeless tobacco: Never Used     Alcohol use Yes     Drug use: No     Sexual activity: Not on file     Other Topics Concern     Not on file     Social History Narrative           ROS  General: + fatigue and night sweats. Denies changes in weight, weakness, appetite changes, hot flashes, fever, chills, or difficulty sleeping  HEENT: Denies headaches, hair loss, visual difficulty or disturbances, spots or floaters, diplopia, masses, head injury, tinnitus, hearing loss, epistaxis, congestion, problems with teeth or gums, dysphonia, or dysphagia  Pulmonary: Denies cough, sputum, hemoptysis, shortness of breath, dyspnea on exertion, wheezing, or allergies  Cardiovascular: + edema, cardiac history,  "hypertension. Denies chest pain, fainting, palpitations, murmurs, activity intoleranance  Gastrointestinal: Denies nausea, vomiting, constipation, diarrhea, abdominal pain, bloating, heartburn, melena, hematochezia, or jaundice  Genitourinary: + vaginal discharge. Denies dysuria, urinary urgency or frequency, hematuria, cloudy or malodorous urine, incontinence, repeat urinary tract infections, flank pain, pelvic pain, vaginal bleeding, or vaginal dryness  Sexual Function: Denies pain with intercourse, changes in libido, arousal difficulty, or changes in orgasm  Integumentary: Denies rashes, sores, changing moles, or scarring  Hematologic: Denies swollen lymph nodes, masses, easy bruising, or easy bleeding  Musculoskeletal: Denies falls, back pain, myalgias, arthralgias, stiffness, muscle weakness or muscle cramps  Neurologic: Denies numbness or tingling, changes in memory, difficulty with walking, dizziness, seizures, or tremors  Psychiatric: Denies anxiety, depression, nervousness, mood changes, suicidal thoughts, or difficulty concentrating  Endocrine: Denies polydipsia, polyuria, temperature intolerance, or history of thyroid disease          Physical Exam:    /73 (BP Location: Left arm, Patient Position: Chair, Cuff Size: Adult Regular)  Pulse 80  Temp 98.8  F (37.1  C) (Oral)  Resp 16  Ht 1.626 m (5' 4.02\")  Wt 68.2 kg (150 lb 4.8 oz)  SpO2 97%  BMI 25.79 kg/m2    CONSTITUTIONAL: Alert non-toxic appearing female in no acute distress  HEAD: Normocephalic, atraumatic  EYES: PERRLA; no scleral icterus  ENT: Oropharynx pink without lesions  NECK: Neck supple without lymphadenopathy  RESPIRATORY: Lungs clear to auscultation, no increased work of breathing noted  CV: Regular rate and rhythm, S1S2, no clicks, murmurs, rubs, or gallops; bilateral lower extremities with trace edema, dorsalis pedis pulses 2+ bilaterally  GASTROINTESTINAL: Normoactive bowel sounds x4 quadrants, abdomen soft, non-distended, " and non-tender to palpation without masses or organomegaly  GENITOURINARY: Not performed  LYMPHATIC: Cervical, supraclavicular, and inguinal nodes without lymphadenopathy  MUSCULOSKELETAL: Moves all extremities, no obvious muscle wasting  NEUROLOGIC: No gross deficits, normal gait  SKIN: Appropriate color for race, warm and dry, no rashes or lesions to unclothed skin  PSYCHIATRIC: Pleasant and interactive, affect bright, makes appropriate eye contact, thought process linear    Labs:      5/5/2017   Hemoglobin 11.7 - 15.7 g/dL 9.8 (A)   Hematocrit 35.0 - 47.0 % 30.3 (A)   Platelet Count 150 - 450 10e9/L 451 (A)   Sodium 133 - 144 mmol/L 135   Potassium 3.4 - 5.3 mmol/L 4.4   Chloride 94 - 109 mmol/L 99   Carbon Dioxide 20 - 32 mmol/L 28   Urea Nitrogen 7 - 30 mg/dL 17   Creatinine 0.52 - 1.04 mg/dL 0.89   Calcium 8.5 - 10.1 mg/dL 8.1 (A)   Bilirubin Total 0.2 - 1.3 mg/dL 0.5   ALT 0 - 50 U/L 89 (A)   AST 0 - 45 U/L 44   Alkaline Phosphatase 40 - 150 U/L 192 (A)   Albumin 3.4 - 5.0 g/dL 2.5 (A)   Protein Total 6.8 - 8.8 g/dL 7.0   WBC 4.0 - 11.0 10e9/L 7.6       Assessment/Plan:  1) Squamous cell carcinoma of the cervix: Acute issues have resolved. Proceed with cisplatin and EBRT. Reviewed signs and symptoms for when she should contact the clinic or seek additional care, including but not limited to fever, chills, inability to keep down food or fluids, nausea and vomiting not controlled with antiemetics, and diarrhea leading to dehydration. Patient to contact the clinic with any questions or concerns in the interim.  2) Post-hospital follow up:    Hyponatremia: Resolved. Discussed importance of hydration and keeping up her sodium intake. To seek emergency care if she develops s/sxs hyponatremia again. She will have weekly CMPs with her treatment.   Leukocytosis: Resolved   ISABELLA: Resolved, continue with adequate hydration   LFT derangement: Continues to improve. Will hold simvastatin until normalization, will recheck  this on 5/9/17.   Hypertension: To restart home dosing of Coreg (had been taking once daily while inpatient). May restart home dosing of lisinopril as well. At this time, will hold off on restarting hydrochlorothiazide as she will be starting cisplatin and there have been concerns about hypovolemia/adequate hydration. If her BPs continue to be elevated, she continues to have edema, and her creatinine/sodium remain normal she may restart hydrochlorothiazide but I think it would be prudent to see how her body reacts to cisplatin first.  3) Health maintenance issues discussed include to follow up with PCP for non-gynecologic concerns and co-morbid conditions  4) Patient verbalized understanding of and agreement with plan    A total of 20 minutes of face to face time were spent with the patient with over 50% of that time spent in counseling, coordination of care, education, and symptom management.    CASPER Arias, FNP-C  Division of Gynecologic Oncology  [facility]  Pager: [phone]

## 2017-05-05 NOTE — NURSING NOTE
"Oncology Rooming Note    May 5, 2017 3:14 PM   Savannah Camacho is a 77 year old female who presents for:    Chief Complaint   Patient presents with     Oncology Clinic Visit     Cervical and Vaginal Cancer F/U     Initial Vitals: /73 (BP Location: Left arm, Patient Position: Chair, Cuff Size: Adult Regular)  Pulse 80  Temp 98.8  F (37.1  C) (Oral)  Resp 16  Ht 1.626 m (5' 4.02\")  Wt 68.2 kg (150 lb 4.8 oz)  SpO2 97%  BMI 25.79 kg/m2 Estimated body mass index is 25.79 kg/(m^2) as calculated from the following:    Height as of this encounter: 1.626 m (5' 4.02\").    Weight as of this encounter: 68.2 kg (150 lb 4.8 oz). Body surface area is 1.76 meters squared.  No Pain (0) Comment: Data Unavailable   No LMP recorded. Patient is postmenopausal.  Allergies reviewed: Yes  Medications reviewed: Yes    Medications: Medication refills not needed today.  Pharmacy name entered into EPIC: Data Unavailable    Clinical concerns: None Kerry Hurtado was NOT notified.    7 minutes for nursing intake (face to face time)     Nery Young LPN              "

## 2017-05-05 NOTE — MR AVS SNAPSHOT
After Visit Summary   5/5/2017    Savannah Camacho    MRN: [id number]           Patient Information     Date Of Birth          1940        Visit Information        Provider Department      5/5/2017 3:20 PM Kerry Hurtado APRN CNP [facility]        Today's Diagnoses     Malignant neoplasm of cervix, unspecified site (H)    -  1    Encounter for antineoplastic chemotherapy        Hospital discharge follow-up        Benign essential hypertension        Elevated LFTs        Hyponatremia          Care Instructions    Restart your home doses of Coreg and lisinopril. Continue to hold the HCTZ but let us know if you start having more fluid retention. Continue to hold the simvastatin- we'll restart this when your liver numbers become closer to normal.  Take in 64oz of fluids daily- try to increase the amount fluids that contain sodium such as Gatorade, Pedialyte, broth, and V8 juice.        Follow-ups after your visit        Your next 10 appointments already scheduled     May 08, 2017  2:30 PM CDT   EXTERNAL RADIATION TREATMENT with [facility] RAD ONC [facility]   Radiation Oncology Clinic ([facility])    [facility]  1st Floor  [address] [address]   [phone]            May 09, 2017  6:30 AM CDT   [facility] Lab Draw with  [facility] DRAW   [facility] Lab Draw ([facility])    [address]  [address] [facility] [address] [phone]            May 09, 2017  7:00 AM CDT   Infusion 360 with UC ONCOLOGY INFUSION, UC 10 ATC   [facility] ([facility])    [address]  [address] [facility] [address] [phone]            May 09, 2017  2:30 PM CDT   EXTERNAL RADIATION TREATMENT with [facility] RAD ONC VARIAN   Radiation Oncology Clinic ([facility])    [facility]  1st Floor  [address]  [address]   [phone]            May 10, 2017  2:30 PM CDT   EXTERNAL RADIATION TREATMENT with [facility] RAD ONC VARIAN   Radiation Oncology Clinic ([facility])    [facility]  1st Floor  [address] [address]   [phone]            May 11, 2017  2:30 PM CDT   EXTERNAL RADIATION TREATMENT with [facility] RAD ONC VARIAN   Radiation Oncology Clinic ([facility])    [facility]  1st Floor  [address] [address]   [phone]            May 11, 2017  2:45 PM CDT   ON TREATMENT VISIT with Michelle Carrion MD   Radiation Oncology Clinic ([facility])    [facility]  1st Floor  [address] [address]   [phone]            May 12, 2017  2:30 PM CDT   EXTERNAL RADIATION TREATMENT with [facility] RAD ONC VARIAN   Radiation Oncology Clinic ([facility])    [facility]  1st Floor  [address] [address]   [phone]            May 15, 2017  2:30 PM CDT   EXTERNAL RADIATION TREATMENT with [facility] RAD ONC VARIAN   Radiation Oncology Clinic ([facility])    [facility]  1st Floor  [address] [address]   [phone]              Who to contact     If you have questions or need follow up information about today's clinic visit or your schedule please contact [facility] directly at [phone].  Normal or non-critical lab and imaging results will be communicated to you by MyChart, letter or phone within 4 business days after the clinic has received the results. If you do not hear from us within 7 days, please contact the clinic through [employer] or phone. If you have a critical or abnormal lab result, we will notify you by phone as soon as possible.  Submit refill requests through [employer] or call your pharmacy and they will forward the refill request to us.  "Please allow 3 business days for your refill to be completed.          Additional Information About Your Visit        MyChart Information     [employer] lets you send messages to your doctor, view your test results, renew your prescriptions, schedule appointments and more. To sign up, go to [email or web address] . Click on \"Log in\" on the left side of the screen, which will take you to the Welcome page. Then click on \"Sign up Now\" on the right side of the page.     You will be asked to enter the access code listed below, as well as some personal information. Please follow the directions to create your username and password.     Your access code is: [email or web address]  Expires: 2017 11:20 AM     Your access code will  in 90 days. If you need help or a new code, please call your [facility] clinic or [phone].        Care EveryWhere ID     This is your [facility] EveryWhere ID. This could be used by other organizations to access your [facility] medical records  [id number]        Your Vitals Were     Pulse Temperature Respirations Height Pulse Oximetry BMI (Body Mass Index)    80 98.8  F (37.1  C) (Oral) 16 1.626 m (5' 4.02\") 97% 25.79 kg/m2       Blood Pressure from Last 3 Encounters:   17 144/73   17 132/71   17 135/76    Weight from Last 3 Encounters:   17 68.2 kg (150 lb 4.8 oz)   17 68.6 kg (151 lb 4.8 oz)   17 67.4 kg (148 lb 9.6 oz)              Today, you had the following     No orders found for display       Primary Care Provider Office Phone # Fax #    Adán Lanier [phone]       [place] [address] [address] [facility] [address]        Thank you!     Thank you for choosing [facility]  for your care. Our goal is always to provide you with excellent care. Hearing back from our patients is one way we can continue to improve our services. Please take a few minutes to complete the written survey that you may receive in the " mail after your visit with us. Thank you!             Your Updated Medication List - Protect others around you: Learn how to safely use, store and throw away your medicines at www.disposemymeds.org.          This list is accurate as of: 5/5/17  3:55 PM.  Always use your most recent med list.                   Brand Name Dispense Instructions for use    albuterol 108 (90 BASE) MCG/ACT Inhaler    PROAIR HFA/PROVENTIL HFA/VENTOLIN HFA     Inhale 2 puffs into the lungs every 6 hours as needed Reported on 4/20/2017       ASPIRIN PO      Take 81 mg by mouth daily       COREG PO      Take 6.25 mg by mouth 2 times daily       hydrochlorothiazide 25 MG tablet    HYDRODIURIL     daily Reported on 5/5/2017       lisinopril 5 MG tablet    PRINIVIL/ZESTRIL     2 times daily Reported on 5/5/2017       LORazepam 1 MG tablet    ATIVAN    30 tablet    Take 1 tablet (1 mg) by mouth every 6 hours as needed (nausea/vomiting, anxiety or sleep )       MULTIVITAMIN ADULT PO      Take by mouth daily       prochlorperazine 10 MG tablet    COMPAZINE    30 tablet    Take 1 tablet (10 mg) by mouth every 6 hours as needed (nausea/vomiting)       SIMVASTATIN PO      Take 40 mg by mouth once Reported on 5/5/2017       SINGULAIR PO      Take 10 mg by mouth daily

## 2017-05-05 NOTE — LETTER
2017       RE: Savannah Camacho  [address] [facility] [address]     Dear Colleague,    Thank you for referring your patient, Savannah Camacho, to the [facility]. Please see a copy of my visit note below.    Gynecologic Oncology Follow-Up Note  RE: Savannah Camacho  MRN: [id number]  : 1940  Date of Visit: 2017    CC: Savannah Camacho is a 77 year old year old female with cervical cancer (clinical stage IIA2, radiographic stage IIB) who presents today for follow up regarding recent hospitalization and disease management.    HPI: Savannah comes to the clinic accompanied by her  Anil. She's been feeling well since her hospital discharge with no further episodes of dizziness or confusion. She has been off her lisinopril, HCTZ, and simvastatin- she states she has noticed more swelling in her ankles and is concerned about her blood pressure today. Due to her cardiac history, she was told she was supposed to keep her blood pressure as low as she can tolerate it. She still notes small amounts of brown vaginal discharge. She also notes fatigue and night sweats. She is pleased to be feeling better and feels ready to start treatment next week.    Oncology History:  She initially sought care from a gynecologist in 2012 at which time she was noted to have a plaque of her vagina which was biopsied and found to be hyperkeratosis. She was thus treated with steroids and kenolog with substantial improvement in her symptoms. She then had a pap smear with ASCUS, HPV negative in . Most recently she has developed increasing, foul smelling vaginal discharge and again sought care from her gynecologist who rightfully took her to the OR for EUA and biopsies. On EUA she was found to have a confluent area of lesions in the proximal vagina mostly confined to the anterior wall. This area measured approximately 2 x 3 cm and appeared to have a whitish appearance. There was  extension into the right vaginal fornix, where there was an area of necrosis appreciated. Again, the cervix was not able to be visualized on exam. Biopsies were taken of the right vaginal wall and posterior vaginal wall. Both of these were positive for well-differentiated squamous cell carcinoma.      3/10/17:  Upper vaginal biopsy: Well differentiated squamous cell carcinoma     4/19/17: MRI Pelvis: Large exophytic heterogeneously enhancing with necrotic areas is seen arising from the upper vagina/fornix on the right side measuring 3.4 x 3.6 x 4 cm. Superiorly the lesion appears to involve the right lateral aspect of the cervix with extension into the parametrium. No extension into the cervical canal. No definite extension into the uterus. Posteriorly there is involvement of the posterior wall of the upper vagina with extension across the midline to the left side. The lesion also abuts the midportion of the rectum with possible involvement. Thrombosis of a right parametrial venous structure is noted. A prominent right internal iliac lymph node measures 5 mm in short axis. Small amount of free fluid is noted in the pelvis. Visualized bowel loops otherwise appear unremarkable. Pelvic musculature is of normal appearance. No suspicious osseous lesions.     4/20/17: PET/CT:   IMPRESSION: In this patient with malignant neoplasm of exocervix:  1. Cervical cancer arising from the right upper cervix, involving the  upper vagina corresponds to known malignancy, better characterized on  4/19/2017.  2. There are several mildly hypermetabolic lymph nodes in the pelvis  including right internal iliac chain, right common iliac chain and  left external iliac chain which he be treated as metastatic disease  under proven otherwise.   3. Nodular thickening in the right lung apex, likely presents fibrosis  or scarring. Recommend follow-up chest CT to ensure stability    4/30- 5/3/17: Presented to ED with lightheadedness, found to be  significantly hyponatremic with Na 114. Hospitalized, sodium corrected. Thought to be due to hypovolemia. Chemoradiation deferred while inpatient.      Past Medical History:   Diagnosis Date     Asthma      Cervical cancer (H)      Congestive heart failure (CHF) (H)      Hyperlipidemia      Hypertension      Lung nodule     Long standing Rt apical lung nodule     MI (myocardial infarction) (H) 2009    involving anterior wall        Past Surgical History:   Procedure Laterality Date     BIOPSY OF VAGINA,SIMPLE  02/2017    also bx from 2012,      COLONOSCOPY       HEART CATH STENT COR W/WO PTCA  2009       Current Outpatient Prescriptions   Medication     LORazepam (ATIVAN) 1 MG tablet     prochlorperazine (COMPAZINE) 10 MG tablet     Montelukast Sodium (SINGULAIR PO)     SIMVASTATIN PO     Carvedilol (COREG PO)     albuterol (PROAIR HFA/PROVENTIL HFA/VENTOLIN HFA) 108 (90 BASE) MCG/ACT Inhaler     Multiple Vitamins-Minerals (MULTIVITAMIN ADULT PO)     ASPIRIN PO     No current facility-administered medications for this visit.           Allergies   Allergen Reactions     Amoxicillin Rash       Family History   Problem Relation Age of Onset     CANCER Other      Daughter had Breast Cancer at age 39       Social History     Social History     Marital status:      Spouse name: N/A     Number of children: N/A     Years of education: N/A     Occupational History     Not on file.     Social History Main Topics     Smoking status: Never Smoker     Smokeless tobacco: Never Used     Alcohol use Yes     Drug use: No     Sexual activity: Not on file     Other Topics Concern     Not on file     Social History Narrative           ROS  General: + fatigue and night sweats. Denies changes in weight, weakness, appetite changes, hot flashes, fever, chills, or difficulty sleeping  HEENT: Denies headaches, hair loss, visual difficulty or disturbances, spots or floaters, diplopia, masses, head injury, tinnitus, hearing loss,  "epistaxis, congestion, problems with teeth or gums, dysphonia, or dysphagia  Pulmonary: Denies cough, sputum, hemoptysis, shortness of breath, dyspnea on exertion, wheezing, or allergies  Cardiovascular: + edema, cardiac history, hypertension. Denies chest pain, fainting, palpitations, murmurs, activity intoleranance  Gastrointestinal: Denies nausea, vomiting, constipation, diarrhea, abdominal pain, bloating, heartburn, melena, hematochezia, or jaundice  Genitourinary: + vaginal discharge. Denies dysuria, urinary urgency or frequency, hematuria, cloudy or malodorous urine, incontinence, repeat urinary tract infections, flank pain, pelvic pain, vaginal bleeding, or vaginal dryness  Sexual Function: Denies pain with intercourse, changes in libido, arousal difficulty, or changes in orgasm  Integumentary: Denies rashes, sores, changing moles, or scarring  Hematologic: Denies swollen lymph nodes, masses, easy bruising, or easy bleeding  Musculoskeletal: Denies falls, back pain, myalgias, arthralgias, stiffness, muscle weakness or muscle cramps  Neurologic: Denies numbness or tingling, changes in memory, difficulty with walking, dizziness, seizures, or tremors  Psychiatric: Denies anxiety, depression, nervousness, mood changes, suicidal thoughts, or difficulty concentrating  Endocrine: Denies polydipsia, polyuria, temperature intolerance, or history of thyroid disease          Physical Exam:    /73 (BP Location: Left arm, Patient Position: Chair, Cuff Size: Adult Regular)  Pulse 80  Temp 98.8  F (37.1  C) (Oral)  Resp 16  Ht 1.626 m (5' 4.02\")  Wt 68.2 kg (150 lb 4.8 oz)  SpO2 97%  BMI 25.79 kg/m2    CONSTITUTIONAL: Alert non-toxic appearing female in no acute distress  HEAD: Normocephalic, atraumatic  EYES: PERRLA; no scleral icterus  ENT: Oropharynx pink without lesions  NECK: Neck supple without lymphadenopathy  RESPIRATORY: Lungs clear to auscultation, no increased work of breathing noted  CV: Regular " rate and rhythm, S1S2, no clicks, murmurs, rubs, or gallops; bilateral lower extremities with trace edema, dorsalis pedis pulses 2+ bilaterally  GASTROINTESTINAL: Normoactive bowel sounds x4 quadrants, abdomen soft, non-distended, and non-tender to palpation without masses or organomegaly  GENITOURINARY: Not performed  LYMPHATIC: Cervical, supraclavicular, and inguinal nodes without lymphadenopathy  MUSCULOSKELETAL: Moves all extremities, no obvious muscle wasting  NEUROLOGIC: No gross deficits, normal gait  SKIN: Appropriate color for race, warm and dry, no rashes or lesions to unclothed skin  PSYCHIATRIC: Pleasant and interactive, affect bright, makes appropriate eye contact, thought process linear    Labs:      5/5/2017   Hemoglobin 11.7 - 15.7 g/dL 9.8 (A)   Hematocrit 35.0 - 47.0 % 30.3 (A)   Platelet Count 150 - 450 10e9/L 451 (A)   Sodium 133 - 144 mmol/L 135   Potassium 3.4 - 5.3 mmol/L 4.4   Chloride 94 - 109 mmol/L 99   Carbon Dioxide 20 - 32 mmol/L 28   Urea Nitrogen 7 - 30 mg/dL 17   Creatinine 0.52 - 1.04 mg/dL 0.89   Calcium 8.5 - 10.1 mg/dL 8.1 (A)   Bilirubin Total 0.2 - 1.3 mg/dL 0.5   ALT 0 - 50 U/L 89 (A)   AST 0 - 45 U/L 44   Alkaline Phosphatase 40 - 150 U/L 192 (A)   Albumin 3.4 - 5.0 g/dL 2.5 (A)   Protein Total 6.8 - 8.8 g/dL 7.0   WBC 4.0 - 11.0 10e9/L 7.6       Assessment/Plan:  1) Squamous cell carcinoma of the cervix: Acute issues have resolved. Proceed with cisplatin and EBRT. Reviewed signs and symptoms for when she should contact the clinic or seek additional care, including but not limited to fever, chills, inability to keep down food or fluids, nausea and vomiting not controlled with antiemetics, and diarrhea leading to dehydration. Patient to contact the clinic with any questions or concerns in the interim.  2) Post-hospital follow up:    Hyponatremia: Resolved. Discussed importance of hydration and keeping up her sodium intake. To seek emergency care if she develops s/sxs  hyponatremia again. She will have weekly CMPs with her treatment.   Leukocytosis: Resolved   ISABELLA: Resolved, continue with adequate hydration   LFT derangement: Continues to improve. Will hold simvastatin until normalization, will recheck this on 5/9/17.   Hypertension: To restart home dosing of Coreg (had been taking once daily while inpatient). May restart home dosing of lisinopril as well. At this time, will hold off on restarting hydrochlorothiazide as she will be starting cisplatin and there have been concerns about hypovolemia/adequate hydration. If her BPs continue to be elevated, she continues to have edema, and her creatinine/sodium remain normal she may restart hydrochlorothiazide but I think it would be prudent to see how her body reacts to cisplatin first.  3) Health maintenance issues discussed include to follow up with PCP for non-gynecologic concerns and co-morbid conditions  4) Patient verbalized understanding of and agreement with plan    A total of 20 minutes of face to face time were spent with the patient with over 50% of that time spent in counseling, coordination of care, education, and symptom management.    CASPER Arias, FNP-C  Division of Gynecologic Oncology  [facility]  Pager: [phone]

## 2017-05-06 LAB
BACTERIA SPEC CULT: NO GROWTH
MICRO REPORT STATUS: NORMAL
SPECIMEN SOURCE: NORMAL

## 2017-05-08 ENCOUNTER — APPOINTMENT (OUTPATIENT)
Dept: RADIATION ONCOLOGY | Facility: CLINIC | Age: 77
End: 2017-05-08
Attending: RADIOLOGY
Payer: MEDICARE

## 2017-05-08 PROCEDURE — 77386 ZZH IMRT TREATMENT DELIVERY, COMPLEX: CPT | Performed by: RADIOLOGY

## 2017-05-09 ENCOUNTER — INFUSION THERAPY VISIT (OUTPATIENT)
Dept: ONCOLOGY | Facility: CLINIC | Age: 77
End: 2017-05-09
Attending: OBSTETRICS & GYNECOLOGY
Payer: MEDICARE

## 2017-05-09 ENCOUNTER — APPOINTMENT (OUTPATIENT)
Dept: RADIATION ONCOLOGY | Facility: CLINIC | Age: 77
End: 2017-05-09
Attending: RADIOLOGY
Payer: MEDICARE

## 2017-05-09 ENCOUNTER — ALLIED HEALTH/NURSE VISIT (OUTPATIENT)
Dept: ONCOLOGY | Facility: CLINIC | Age: 77
End: 2017-05-09

## 2017-05-09 VITALS
HEART RATE: 95 BPM | OXYGEN SATURATION: 98 % | TEMPERATURE: 98.2 F | WEIGHT: 145.5 LBS | RESPIRATION RATE: 16 BRPM | SYSTOLIC BLOOD PRESSURE: 98 MMHG | DIASTOLIC BLOOD PRESSURE: 54 MMHG | BODY MASS INDEX: 24.96 KG/M2

## 2017-05-09 DIAGNOSIS — Z71.9 VISIT FOR COUNSELING: Primary | ICD-10-CM

## 2017-05-09 DIAGNOSIS — C53.1 MALIGNANT NEOPLASM OF EXOCERVIX (H): Primary | ICD-10-CM

## 2017-05-09 LAB
ALBUMIN SERPL-MCNC: 2.7 G/DL (ref 3.4–5)
ALP SERPL-CCNC: 138 U/L (ref 40–150)
ALT SERPL W P-5'-P-CCNC: 40 U/L (ref 0–50)
ANION GAP SERPL CALCULATED.3IONS-SCNC: 9 MMOL/L (ref 3–14)
AST SERPL W P-5'-P-CCNC: 13 U/L (ref 0–45)
BASOPHILS # BLD AUTO: 0 10E9/L (ref 0–0.2)
BASOPHILS NFR BLD AUTO: 0.5 %
BILIRUB SERPL-MCNC: 0.5 MG/DL (ref 0.2–1.3)
BUN SERPL-MCNC: 13 MG/DL (ref 7–30)
CALCIUM SERPL-MCNC: 8.5 MG/DL (ref 8.5–10.1)
CHLORIDE SERPL-SCNC: 104 MMOL/L (ref 94–109)
CO2 SERPL-SCNC: 25 MMOL/L (ref 20–32)
CREAT SERPL-MCNC: 0.82 MG/DL (ref 0.52–1.04)
DIFFERENTIAL METHOD BLD: ABNORMAL
EOSINOPHIL # BLD AUTO: 0.2 10E9/L (ref 0–0.7)
EOSINOPHIL NFR BLD AUTO: 2.6 %
ERYTHROCYTE [DISTWIDTH] IN BLOOD BY AUTOMATED COUNT: 14.8 % (ref 10–15)
GFR SERPL CREATININE-BSD FRML MDRD: 68 ML/MIN/1.7M2
GLUCOSE SERPL-MCNC: 147 MG/DL (ref 70–99)
HCT VFR BLD AUTO: 31.2 % (ref 35–47)
HGB BLD-MCNC: 10.2 G/DL (ref 11.7–15.7)
IMM GRANULOCYTES # BLD: 0.1 10E9/L (ref 0–0.4)
IMM GRANULOCYTES NFR BLD: 1.4 %
LYMPHOCYTES # BLD AUTO: 0.9 10E9/L (ref 0.8–5.3)
LYMPHOCYTES NFR BLD AUTO: 16.2 %
MAGNESIUM SERPL-MCNC: 2.1 MG/DL (ref 1.6–2.3)
MCH RBC QN AUTO: 30.4 PG (ref 26.5–33)
MCHC RBC AUTO-ENTMCNC: 32.7 G/DL (ref 31.5–36.5)
MCV RBC AUTO: 93 FL (ref 78–100)
MONOCYTES # BLD AUTO: 0.4 10E9/L (ref 0–1.3)
MONOCYTES NFR BLD AUTO: 6.6 %
NEUTROPHILS # BLD AUTO: 4.2 10E9/L (ref 1.6–8.3)
NEUTROPHILS NFR BLD AUTO: 72.7 %
NRBC # BLD AUTO: 0 10*3/UL
NRBC BLD AUTO-RTO: 0 /100
PLATELET # BLD AUTO: 436 10E9/L (ref 150–450)
POTASSIUM SERPL-SCNC: 3.9 MMOL/L (ref 3.4–5.3)
PROT SERPL-MCNC: 7.2 G/DL (ref 6.8–8.8)
RBC # BLD AUTO: 3.36 10E12/L (ref 3.8–5.2)
SODIUM SERPL-SCNC: 138 MMOL/L (ref 133–144)
WBC # BLD AUTO: 5.7 10E9/L (ref 4–11)

## 2017-05-09 PROCEDURE — 96413 CHEMO IV INFUSION 1 HR: CPT

## 2017-05-09 PROCEDURE — 96367 TX/PROPH/DG ADDL SEQ IV INF: CPT

## 2017-05-09 PROCEDURE — 36415 COLL VENOUS BLD VENIPUNCTURE: CPT

## 2017-05-09 PROCEDURE — 83735 ASSAY OF MAGNESIUM: CPT | Performed by: NURSE PRACTITIONER

## 2017-05-09 PROCEDURE — 25000125 ZZHC RX 250: Mod: ZF | Performed by: OBSTETRICS & GYNECOLOGY

## 2017-05-09 PROCEDURE — 80053 COMPREHEN METABOLIC PANEL: CPT | Performed by: NURSE PRACTITIONER

## 2017-05-09 PROCEDURE — 77386 ZZH IMRT TREATMENT DELIVERY, COMPLEX: CPT | Performed by: RADIOLOGY

## 2017-05-09 PROCEDURE — 25000128 H RX IP 250 OP 636: Mod: ZF | Performed by: OBSTETRICS & GYNECOLOGY

## 2017-05-09 PROCEDURE — 96375 TX/PRO/DX INJ NEW DRUG ADDON: CPT

## 2017-05-09 PROCEDURE — 25800025 ZZH RX 258: Mod: ZF | Performed by: OBSTETRICS & GYNECOLOGY

## 2017-05-09 PROCEDURE — 85025 COMPLETE CBC W/AUTO DIFF WBC: CPT | Performed by: NURSE PRACTITIONER

## 2017-05-09 PROCEDURE — 96361 HYDRATE IV INFUSION ADD-ON: CPT

## 2017-05-09 RX ORDER — PALONOSETRON 0.05 MG/ML
0.25 INJECTION, SOLUTION INTRAVENOUS ONCE
Status: COMPLETED | OUTPATIENT
Start: 2017-05-09 | End: 2017-05-09

## 2017-05-09 RX ORDER — EPINEPHRINE 0.3 MG/.3ML
INJECTION SUBCUTANEOUS
Status: DISCONTINUED
Start: 2017-05-09 | End: 2017-05-09 | Stop reason: WASHOUT

## 2017-05-09 RX ORDER — DEXTROSE, SODIUM CHLORIDE, AND POTASSIUM CHLORIDE 5; .45; .15 G/100ML; G/100ML; G/100ML
2000 INJECTION INTRAVENOUS ONCE
Status: COMPLETED | OUTPATIENT
Start: 2017-05-09 | End: 2017-05-09

## 2017-05-09 RX ADMIN — DEXAMETHASONE SODIUM PHOSPHATE 12 MG: 10 INJECTION, SOLUTION INTRAMUSCULAR; INTRAVENOUS at 07:24

## 2017-05-09 RX ADMIN — SODIUM CHLORIDE 150 MG: 9 INJECTION, SOLUTION INTRAVENOUS at 07:45

## 2017-05-09 RX ADMIN — PALONOSETRON HYDROCHLORIDE 0.25 MG: 0.25 INJECTION INTRAVENOUS at 07:23

## 2017-05-09 RX ADMIN — POTASSIUM CHLORIDE, DEXTROSE MONOHYDRATE AND SODIUM CHLORIDE 2000 ML: 150; 5; 450 INJECTION, SOLUTION INTRAVENOUS at 08:11

## 2017-05-09 RX ADMIN — CISPLATIN 70 MG: 1 INJECTION, SOLUTION INTRAVENOUS at 09:29

## 2017-05-09 ASSESSMENT — PAIN SCALES - GENERAL: PAINLEVEL: NO PAIN (0)

## 2017-05-09 NOTE — MR AVS SNAPSHOT
After Visit Summary   5/9/2017    Savannah Camacho    MRN: [id number]           Patient Information     Date Of Birth          1940        Visit Information        Provider Department      5/9/2017 11:17 AM [facility] Gynecologic [facility]        Today's Diagnoses     Visit for counseling    -  1       Follow-ups after your visit        Your next 10 appointments already scheduled     May 09, 2017  2:30 PM CDT   EXTERNAL RADIATION TREATMENT with UMP RAD ONC VARIAN   Radiation Oncology Clinic ([facility])    [facility]  1st Floor  [address] [facility] [address]   [phone]            May 10, 2017  2:30 PM CDT   EXTERNAL RADIATION TREATMENT with UMP RAD ONC VARIAN   Radiation Oncology Clinic ([facility])    [facility]  1st Floor  [address] [facility] [address]   [phone]            May 11, 2017  2:30 PM CDT   EXTERNAL RADIATION TREATMENT with UMP RAD ONC VARIAN   Radiation Oncology Clinic ([facility])    [facility]  1st Floor  [address] [address]   [phone]            May 11, 2017  2:45 PM CDT   ON TREATMENT VISIT with Michelle Carrion MD   Radiation Oncology Clinic ([facility])    [facility]  1st Floor  [address] [address]   [phone]            May 12, 2017  2:30 PM CDT   EXTERNAL RADIATION TREATMENT with UMP RAD ONC VARIAN   Radiation Oncology Clinic ([facility])    [facility]  1st Floor  [address] [address]   [phone]            May 15, 2017  2:30 PM CDT   EXTERNAL RADIATION TREATMENT with UMP RAD ONC VARIAN   Radiation Oncology Clinic ([facility])    [facility]  1st Floor  [address] [facility] [address]   [phone]            May 16, 2017  6:30 AM  CDT   [facility] Lab Draw with  [facility] LAB DRAW   [facility]    [address]  2nd Floor  [facility] [id number] [phone]            May 16, 2017  7:00 AM CDT   Infusion 360 with UC ONCOLOGY INFUSION, UC 11 ATC   [facility] ([facility])    [address]  2nd Floor  [facility] [id number] [phone]            May 16, 2017  2:30 PM CDT   EXTERNAL RADIATION TREATMENT with [facility] RAD ONC VARIAN   Radiation Oncology Clinic ([facility])    [facility]  1st Floor  [address] [id number] [phone]              Who to contact     Please call your clinic at [phone] to:    Ask questions about your health    Make or cancel appointments    Discuss your medicines    Learn about your test results    Speak to your doctor   If you have compliments or concerns about an experience at your clinic, or if you wish to file a complaint, please contact [facility] Patient Relations at [phone] or email us at [email or web address]         Additional Information About Your Visit        [employer] Information     [employer] is an electronic gateway that provides easy, online access to your medical records. With One[employer], you can request a clinic appointment, read your test results, renew a prescription or communicate with your care team.     To sign up for [employer] visit the website at [email or web address]   You will be asked to enter the access code listed below, as well as some personal information. Please follow the directions to create your username and password.     Your access code is: [email or web address]  Expires: 2017 11:20 AM     Your access code will  in 90 days. If you need help or a new code, please contact your [facility] or call [phone] for assistance.        Care  EveryWhere ID     This is your Care EveryWhere ID. This could be used by other organizations to access your [place] medical records  [id number]         Blood Pressure from Last 3 Encounters:   05/09/17 98/54   05/05/17 144/73   05/03/17 132/71    Weight from Last 3 Encounters:   05/09/17 66 kg (145 lb 8 oz)   05/05/17 68.2 kg (150 lb 4.8 oz)   05/04/17 68.6 kg (151 lb 4.8 oz)              Today, you had the following     No orders found for display       Primary Care Provider Office Phone # Fax #    Adán Lanier [phone]       [address] [place] [facility] [address]        Thank you!     Thank you for choosing GYNECOLOGIC CANCER CLINIC  for your care. Our goal is always to provide you with excellent care. Hearing back from our patients is one way we can continue to improve our services. Please take a few minutes to complete the written survey that you may receive in the mail after your visit with us. Thank you!             Your Updated Medication List - Protect others around you: Learn how to safely use, store and throw away your medicines at www.disposemymeds.org.          This list is accurate as of: 5/9/17 11:25 AM.  Always use your most recent med list.                   Brand Name Dispense Instructions for use    albuterol 108 (90 BASE) MCG/ACT Inhaler    PROAIR HFA/PROVENTIL HFA/VENTOLIN HFA     Inhale 2 puffs into the lungs every 6 hours as needed Reported on 4/20/2017       ASPIRIN PO      Take 81 mg by mouth daily       COREG PO      Take 6.25 mg by mouth 2 times daily       hydrochlorothiazide 25 MG tablet    HYDRODIURIL     daily Reported on 5/5/2017       lisinopril 5 MG tablet    PRINIVIL/ZESTRIL     2 times daily Reported on 5/5/2017       LORazepam 1 MG tablet    ATIVAN    30 tablet    Take 1 tablet (1 mg) by mouth every 6 hours as needed (nausea/vomiting, anxiety or sleep )       MULTIVITAMIN ADULT PO      Take by mouth daily       prochlorperazine 10  MG tablet    COMPAZINE    30 tablet    Take 1 tablet (10 mg) by mouth every 6 hours as needed (nausea/vomiting)       SIMVASTATIN PO      Take 40 mg by mouth once Reported on 5/5/2017       SINGULAIR PO      Take 10 mg by mouth daily

## 2017-05-09 NOTE — PATIENT INSTRUCTIONS
Contact numbers:  Triage Main Line: [phone]  After hours: [phone]    Call with chills and/or temperature greater than or equal to 100.5 and questions or concerns.    If after hours, weekends, or holidays, call main hospital  at  [phone] and ask for Oncology doctor on call.           May 2017   [place] Monday Tuesday Wednesday Thursday Friday Saturday        1     US ABD/PEL DUPLEX COMPLETE    7:55 AM   (50 min.)   [id number]   [facility], [place], [facility]     UMP EXTERNAL RADIATION TREATMT    2:00 PM   (15 min.)   UMP RAD ONC [facility]   Radiation Oncology Clinic 2     UMP EXTERNAL RADIATION TREATMT    2:30 PM   (15 min.)   UMP RAD ONC VARIAN   Radiation Oncology Clinic 3     UMP EXTERNAL RADIATION TREATMT    2:30 PM   (15 min.)   UMP RAD ONC VARIAN   Radiation Oncology Clinic 4     UMP EXTERNAL RADIATION TREATMT    2:30 PM   (15 min.)   UMP RAD ONC [facility]   Radiation Oncology Clinic     UMP ON TREATMENT VISIT    2:45 PM   (15 min.)   Michelle Carrion MD   Radiation Oncology Clinic 5     UMP EXTERNAL RADIATION TREATMT    2:30 PM   (15 min.)   UMP RAD ONC [facility]   Radiation Oncology Clinic     LAB    3:00 PM   (15 min.)    LAB   [facility] Lab     UMP RETURN ACTIVE TREATMENT    3:05 PM   (40 min.)   Kerry Hurtado APRN CNP   [facility] 6       7     8     UMP EXTERNAL RADIATION TREATMT    2:30 PM   (15 min.)   UMP RAD ONC [facility]   Radiation Oncology Clinic 9     [facility] LAB DRAW    6:30 AM   (15 min.)    [facility] LAB DRAW   [facility] Lab Draw     UMP ONC INFUSION 360    7:00 AM   (360 min.)   [facility] ONCOLOGY INFUSION   [facility]     UMP EXTERNAL RADIATION TREATMT    2:30 PM   (15 min.)   UMP RAD ONC [facility]   Radiation Oncology Clinic 10     UMP EXTERNAL RADIATION TREATMT    2:30 PM   (15 min.)   UMP RAD ONC VARIAN   [facility]cology [facility] 11     UMP EXTERNAL RADIATION TREATMT    2:30 PM   (15 min.)   UMP RAD ONC [facility]   Radiation Oncology [facility]      UMP ON TREATMENT VISIT    2:45 PM   (15 min.)   Michelle Carrion MD   [facility] Oncology Clinic 12     UMP EXTERNAL RADIATION TREATMT    2:30 PM   (15 min.)   UMP RAD ONC [facility]   Radiation Oncology Clinic 13       14     15     UMP EXTERNAL RADIATION TREATMT    2:30 PM   (15 min.)   UMP RAD ONC [facility]   Radiation Oncology Clinic 16     [facility] LAB DRAW    6:30 AM   (15 min.)    [facility] LAB DRAW   [facility] Lab Draw     UMP ONC INFUSION 360    7:00 AM   (360 min.)   [facility] ONCOLOGY INFUSION   [facility]     UMP EXTERNAL RADIATION TREATMT    2:30 PM   (15 min.)   UMP RAD ONC [facility]   Radiation Oncology Clinic 17     UMP EXTERNAL RADIATION TREATMT    2:30 PM   (15 min.)   UMP RAD ONC [facility]   Radiation Oncology Clinic     UMP ON TREATMENT VISIT    2:45 PM   (15 min.)   Michelle Carrion MD   Radiation Oncology Clinic 18     UMP EXTERNAL RADIATION TREATMT    2:30 PM   (15 min.)   UMP RAD ONC [facility]   Radiation Oncology Clinic 19     UMP EXTERNAL RADIATION TREATMT    2:30 PM   (15 min.)   UMP RAD ONC [facility]   Radiation Oncology Clinic 20       21     22     UMP EXTERNAL RADIATION TREATMT    2:30 PM   (15 min.)   UMP RAD ONC [facility]   Radiation Oncology Clinic 23     UMP [facility] LAB DRAW    6:30 AM   (15 min.)   [facility] LAB DRAW   [facility] Lab Draw     UMP ONC INFUSION 360    7:00 AM   (360 min.)   UC ONCOLOGY INFUSION   [facility]     UMP EXTERNAL RADIATION TREATMT    2:30 PM   (15 min.)   UMP RAD ONC [facility]   Radiation Oncology Clinic 24     UMP EXTERNAL RADIATION TREATMT    2:30 PM   (15 min.)   UMP RAD ONC VARIAN   [facility]cology [facility] 25     UMP EXTERNAL RADIATION TREATMT    2:30 PM   (15 min.)   UMP RAD ONC [facility]   Radiation Oncology Clinic     UMP ON TREATMENT VISIT    2:45 PM   (15 min.)   Michelle Carrion MD   Radiation Oncology Clinic 26     UMP EXTERNAL RADIATION TREATMT    2:30 PM   (15 min.)   UMP RAD ONC [facility]   Radiation Oncology Clinic 27        28     29     30     P [facility] LAB DRAW    6:30 AM   (15 min.)    [facility] LAB DRAW   [facility] Lab Draw     P ONC INFUSION 360    7:00 AM   (360 min.)   [facility]    2:30 PM   (15 min.)   UMP RAD ONC [facility]   Radiation Oncology Clinic     MR PELVIS WWO    3:00 PM   (60 min.)   [id number]   [facility], [place], MRI 31     UMP EXTERNAL RADIATION TREATMT    2:30 PM   (15 min.)   UMP RAD ONC [facility]   Radiation Oncology Clinic                           June 2017 Sunday Monday Tuesday Wednesday Thursday Friday Saturday                       1     PAC EVAL   11:15 AM   (60 min.)   3,  Pac [place]   [facility]     PAC RN ASSESSMENT   12:15 PM   (30 min.)   Rn,  Pac   [facility]     PAC ANESTHESIA CONSULT   12:45 PM   (10 min.)   Anesthesiologist,  Pac   [facility]     [facility] EXTERNAL RADIATION TREATMT    2:30 PM   (15 min.)   UMP RAD ONC [facility]   Radiation Oncology Clinic     [facility] ON TREATMENT VISIT    2:45 PM   (15 min.)   Michelle Carrion MD   Radiation Oncology Clinic 2     P EXTERNAL RADIATION TREATMT    2:30 PM   (15 min.)   P RAD ONC [facility]   Radiation Oncology Clinic 3       4     5     P [facility] LAB DRAW    6:30 AM   (15 min.)    [facility] LAB DRAW   [facility] Lab Draw     P ONC INFUSION 360    7:00 AM   (360 min.)   [facility] ONCOL[facility]Y INFUSION   [facility]    2:30 PM   (15 min.)   UMP RAD ONC [facility]   Radiation Oncology Clinic 6     7     8     9     10       11     12     13     P TCT/SIM SUITE    6:00 AM   (90 min.)   Nicole Ward MD   Radiation Oncology Clinic     INSERT INTERSTITIAL NEEDLE, ULTRASOUND GUIDED    8:00 AM   Nicole Ward MD   [facility] OR     P TCT/SIM SUITE   11:00 AM   (60 min.)   Nicole Ward MD   Radiation Oncology Clinic     [facility] TCT/SIM  SUITE    3:00 PM   (60 min.)   Nicole Ward MD   Radiation Oncology Clinic 14     [facility] TCT/SIM SUITE    8:00 AM   (90 min.)   Michelle Carrion MD   Radiation Oncology [facility] TCT/SIM SUITE    2:30 PM   (60 min.)   Michelle Carrion MD   Radiation Oncology Clinic 15     [facility] TCT/SIM SUITE    8:00 AM   (60 min.)   Michelle Carrion MD   Radiation Oncology [facility] TCT/SIM SUITE    2:30 PM   (30 min.)   Michel[facility] Carrion [facility]   [facility]    3:55 PM   (30 min.)   Michelle Carrion MD   Radiation Oncology Clinic     REMOVE RADIATION NEEDLES CERVIX    4:00 PM   Michelle Carrion MD   [facility] OR 16     17       18     19     20     21     22     23     24       25     26     27     28     29     30                       Lab Results:  Recent Results (from the past 12 hour(s))   CBC with platelets differential    Collection Time: 05/09/17  6:36 AM   Result Value Ref Range    WBC 5.7 4.0 - 11.0 10e9/L    RBC Count 3.36 (L) 3.8 - 5.2 10e12/L    Hemoglobin 10.2 (L) 11.7 - 15.7 g/dL    Hematocrit 31.2 (L) 35.0 - 47.0 %    MCV 93 78 - 100 fl    MCH 30.4 26.5 - 33.0 pg    MCHC 32.7 31.5 - 36.5 g/dL    RDW 14.8 10.0 - 15.0 %    Platelet Count 436 150 - 450 10e9/L    Diff Method Automated Method     % Neutrophils 72.7 %    % Lymphocytes 16.2 %    % Monocytes 6.6 %    % Eosinophils 2.6 %    % Basophils 0.5 %    % Immature Granulocytes 1.4 %    Nucleated RBCs 0 0 /100    Absolute Neutrophil 4.2 1.6 - 8.3 10e9/L    Absolute Lymphocytes 0.9 0.8 - 5.3 10e9/L    Absolute Monocytes 0.4 0.0 - 1.3 10e9/L    Absolute Eosinophils 0.2 0.0 - 0.7 10e9/L    Absolute Basophils 0.0 0.0 - 0.2 10e9/L    Abs Immature Granulocytes 0.1 0 - 0.4 10e9/L    Absolute Nucleated RBC 0.0    Comprehensive metabolic panel    Collection Time: 05/09/17  6:36 AM   Result Value Ref Range    Sodium 138 133 - 144 mmol/L    Potassium 3.9 3.4 - 5.3 mmol/L    Chloride 104 94 - 109 mmol/L    Carbon Dioxide 25 20 - 32 mmol/L    Anion  Gap 9 3 - 14 mmol/L    Glucose 147 (H) 70 - 99 mg/dL    Urea Nitrogen 13 7 - 30 mg/dL    Creatinine 0.82 0.52 - 1.04 mg/dL    GFR Estimate 68 >60 mL/min/1.7m2    GFR Estimate If Black 82 >60 mL/min/1.7m2    Calcium 8.5 8.5 - 10.1 mg/dL    Bilirubin Total 0.5 0.2 - 1.3 mg/dL    Albumin 2.7 (L) 3.4 - 5.0 g/dL    Protein Total 7.2 6.8 - 8.8 g/dL    Alkaline Phosphatase 138 40 - 150 U/L    ALT 40 0 - 50 U/L    AST 13 0 - 45 U/L   Magnesium    Collection Time: 05/09/17  6:36 AM   Result Value Ref Range    Magnesium 2.1 1.6 - 2.3 mg/dL

## 2017-05-09 NOTE — PROGRESS NOTES
Infusion Nursing Note:  Savannah Camacho presents for D1C1 Cisplatin    Note: pt feeling well today, no new issues or concerns to report since seeing Kerry last week. Patient new to oncology infusion.  New patient teaching done previously.  All medications and side effects reviewed with patient. Patient instructed to call triage with questions/concerns or if they have chills and/or temperature greater than or equal to 100.5, pt verbalized understanding of plan.    Treatment Conditions:  Lab Results   Component Value Date    HGB 10.2 05/09/2017     Lab Results   Component Value Date    WBC 5.7 05/09/2017      Lab Results   Component Value Date    ANEU 4.2 05/09/2017     Lab Results   Component Value Date     05/09/2017      Lab Results   Component Value Date     05/09/2017                   Lab Results   Component Value Date    POTASSIUM 3.9 05/09/2017           Lab Results   Component Value Date    MAG 2.1 05/09/2017            Lab Results   Component Value Date    CR 0.82 05/09/2017                   Lab Results   Component Value Date    PEPE 8.5 05/09/2017                Lab Results   Component Value Date    BILITOTAL 0.5 05/09/2017           Lab Results   Component Value Date    ALBUMIN 2.7 05/09/2017                    Lab Results   Component Value Date    ALT 40 05/09/2017           Lab Results   Component Value Date    AST 13 05/09/2017     Results reviewed, labs MET treatment parameters, ok to proceed with treatment.    Intravenous Access:  Peripheral IV placed.    Post Infusion Assessment:  Patient tolerated infusion without incident.  Blood return noted pre and post infusion.  No evidence of extravasations.  Access discontinued per protocol.    Discharge Plan:   Prescription refills given for ativan and compazine.  Discharge instructions reviewed with: Patient and Family.  Patient and/or family verbalized understanding of discharge instructions and all questions answered.  Copy of AVS reviewed  with patient and/or family.  Patient will return 5/16 for next appointment.  Patient discharged in stable condition accompanied by: self and .    Violette Vanegas RN

## 2017-05-09 NOTE — NURSING NOTE
Chief Complaint   Patient presents with     Blood Draw     Labs drawn by RN from VPT. VS taken.      Labs drawn from  AC.  Gayla Styles RN

## 2017-05-09 NOTE — PROGRESS NOTES
Could someone let her know that her labs have improved and that it's okay for her to start her statin again but I still want her to hold her HCTZ? Thanks! Kerry

## 2017-05-09 NOTE — PROGRESS NOTES
ONCOLOGY SOCIAL WORK  D)  Savannah is a 77-yr-old woman newly dx'd with stage IIB cervical cancer  I) introduction of oncology social work  A) Met w/pt at her first chemotherapy, later joined by her .  The couple live in [place] and are staying at the [facility] during treatment, including 6 weeks of radiation therapy.  They have two grandchildren who live locally and are available to help as needed, as well as other grandchildren from their son who  suddenly about seven years ago.  Their three surviving children all live in [place].  One of her daughters had breast cancer in her early forties and Savannah took her to most of her treatments.    Savannah admits to struggling some in coping with her dx, but feels well supported by her family.  Gave her a list of resources as well as a Senior Linkage Line book.    P) available as needed for support and resources    Na Ge, [facility]  [phone]

## 2017-05-09 NOTE — MR AVS SNAPSHOT
After Visit Summary   5/9/2017    Savannah Camacho    MRN: [id number]           Patient Information     Date Of Birth          1940        Visit Information        Provider Department      5/9/2017 7:00 AM UC 10 ATC; UC ONCOLOGY INFUSION [facility]        Today's Diagnoses     Malignant neoplasm of exocervix (H)    -  1      Care Instructions    Contact numbers:  Triage Main Line: [phone]  After hours: [phone]    Call with chills and/or temperature greater than or equal to 100.5 and questions or concerns.    If after hours, weekends, or holidays, call main hospital  at  [phone] and ask for Oncology doctor on call.           May 2017   [place] Monday Tuesday Wednesday Thursday Friday Saturday        1     US ABD/PEL DUPLEX COMPLETE    7:55 AM   (50 min.)   [id number]   [employer]     UMP EXTERNAL RADIATION TREATMT    2:00 PM   (15 min.)   UMP RAD ONC VARIAN   Radiation Oncology Clinic 2     UMP EXTERNAL RADIATION TREATMT    2:30 PM   (15 min.)   UMP RAD ONC VARIAN   Radiation Oncology Clinic 3     UMP EXTERNAL RADIATION TREATMT    2:30 PM   (15 min.)   UMP RAD ONC VARIAN   Radiation Oncology Clinic 4     UMP EXTERNAL RADIATION TREATMT    2:30 PM   (15 min.)   UMP RAD ONC VARIAN   [facility]iation Oncology Clinic     UMP ON TREATMENT VISIT    2:45 PM   (15 min.)   Michelle Carrion MD   Radiation Oncology Clinic 5     UMP EXTERNAL RADIATION TREATMT    2:30 PM   (15 min.)   P RAD ONC [facility]   Radiation Oncology Clinic     LAB    3:00 PM   (15 min.)    LAB   [facility]     UMP RETURN ACTIVE TREATMENT    3:05 PM   (40 min.)   Kerry Hurtado APRN CNP   [facility] 6       7     8     UMP EXTERNAL RADIATION TREATMT    2:30 PM   (15 min.)   P RAD ONC [facility]3]   Radiation Oncology Clinic 9     [facility] LAB DRAW    6:30 AM   (15 min.)    [facility] LAB DRAW   [facility] Lab Draw     P ONC INFUSION 360    7:00 AM   (360 min.)    UC ONCOLOGY INFUSION   [facility]     UMP EXTERNAL RADIATION TREATMT    2:30 PM   (15 min.)   UMP RAD ONC [facility]   Radiation Oncology Clinic 10     UMP EXTERNAL RADIATION TREATMT    2:30 PM   (15 min.)   UMP RAD ONC [facility]   Radiation Oncology Clinic 11     UMP EXTERNAL RADIATION TREATMT    2:30 PM   (15 min.)   UMP RAD ONC [facility]   Radiation Oncology Clinic     UMP ON TREATMENT VISIT    2:45 PM   (15 min.)   Michelle Carrion MD   Radiation Oncology Clinic 12     UMP EXTERNAL RADIATION TREATMT    2:30 PM   (15 min.)   UMP RAD ONC [facility]   Radiation Oncology Clinic 13       14     15     UMP EXTERNAL RADIATION TREATMT    2:30 PM   (15 min.)   UMP RAD ONC [facility]   Radiation Oncology Clinic 16     [facility] LAB DRAW    6:30 AM   (15 min.)   UC [facility] LAB DRAW   [facility] Lab Draw     P ONC INFUSION 360    7:00 AM   (360 min.)   UC ONCOLOGY INFUSION   [facility]     UMP EXTERNAL RADIATION TREATMT    2:30 PM   (15 min.)   UMP RAD ONC [facility]   Radiation Oncology Clinic 17     UMP EXTERNAL RADIATION TREATMT    2:30 PM   (15 min.)   UMP RAD ONC [facility]   Radiation Oncology Clinic     UMP ON TREATMENT VISIT    2:45 PM   (15 min.)   Michelle Carrion MD   Radiation Oncology Clinic 18     UMP EXTERNAL RADIATION TREATMT    2:30 PM   (15 min.)   UMP RAD ONC [facility]   Radiation Oncology Clinic 19     UMP EXTERNAL RADIATION TREATMT    2:30 PM   (15 min.)   UMP RAD ONC [facility]   Radiation Oncology Clinic 20       21     22     UMP EXTERNAL RADIATION TREATMT    2:30 PM   (15 min.)   UMP RAD ONC [facility]   Radiation Oncology Clinic 23     [facility] LAB DRAW    6:30 AM   (15 min.)   UC [facility] LAB DRAW   [facility] Lab Draw     P ONC INFUSION 360    7:00 AM   (360 min.)   UC ONCOLOGY INFUSION   [facility]     UMP EXTERNAL RADIATION TREATMT    2:30 PM   (15 min.)   UMP RAD ONC [facility]   Radiation Oncology Clinic 24     UMP EXTERNAL RADIATION TREATMT    2:30 PM    (15 min.)   UMP RAD ONC [facility]   Radiation Oncology Clinic 25     UMP EXTERNAL RADIATION TREATMT    2:30 PM   (15 min.)   UMP RAD ONC [facility]   Radiation Oncology Clinic     [facility] ON TREATMENT VISIT    2:45 PM   (15 min.)   Michelle Carrion MD   Radiation Oncology Clinic 26     UMP EXTERNAL RADIATION TREATMT    2:30 PM   (15 min.)   UMP RAD ONC [facility]   Radiation Oncology Clinic 27       28     29     30     UMP [facility] LAB DRAW    6:30 AM   (15 min.)    [facility] LAB DRAW   [facility] Lab Draw     UMP ONC INFUSION 360    7:00 AM   (360 min.)   UC ONCOLOGY INFUSION   [facility]     UMP EXTERNAL RADIATION TREATMT    2:30 PM   (15 min.)   UMP RAD ONC [facility]   Radiation Oncology Clinic     MR PELVIS WWO    3:00 PM   (60 min.)   [id number]   [facility], MRI 31     UMP EXTERNAL RADIATION TREATMT    2:30 PM   (15 min.)   UMP RAD ONC [facility]   Radiation Oncology Clinic                           June 2017 Sunday Monday Tuesday Wednesday Thursday Friday Saturday                       1     PAC EVAL   11:15 AM   (60 min.)   3,  Pac Cliff   [facility]     PAC RN ASSESSMENT   12:15 PM   (30 min.)   Rn,  Pac   [facility]     PAC ANESTHESIA CONSULT   12:45 PM   (10 min.)   Anesthesiologist, [facility]     P EXTERNAL RADIATION TREATMT    2:30 PM   (15 min.)   UMP RAD ONC [facility]   Radiation Oncology Clinic     [facility] ON TREATMENT VISIT    2:45 PM   (15 min.)   Michelle Carrion MD   Radiation Oncology Clinic 2     UMP EXTERNAL RADIATION TREATMT    2:30 PM   (15 min.)   UMP RAD ONC [facility]   Radiation Oncology Clinic 3       4     5     [facility] LAB DRAW    6:30 AM   (15 min.)    [facility] LAB DRAW   [facility] Lab Draw     UMP ONC INFUSION 360    7:00 AM   (360 min.)   UC ONCOLOGY INFUSION   [facility]     UMP EXTERNAL RADIATION TREATMT    2:30 PM   (15 min.)   UMP RAD ONC VARIAN    Radiation Oncology Clinic 6     7     8     9     10       11     12     13     [facility] TCT/SIM SUITE    6:00 AM   (90 min.)   Nicole Ward MD   Radiation Oncology Clinic     INSERT INTERSTITIAL NEEDLE, ULTRASOUND GUIDED    8:00 AM   Nicole Ward MD [facility] OR     [facility] TCT/SIM SUITE   11:00 AM   (60 min.)   Nicole Ward MD   Radiation Oncology Clinic     [facility] TCT/SIM SUITE    3:00 PM   (60 min.)   Nicole Ward MD   Radiation Oncology Clinic 14     [facility] TCT/SIM SUITE    8:00 AM   (90 min.)   Michelle Carrion MD   [facility]    2:30 PM   (60 min.)   Michelle Carrion MD   Radiation Oncology Clinic 15     [facility] TCT/SIM SUITE    8:00 AM   (60 min.)   Michelle Carrion MD   Radiation Oncology [facility] TCT/SIM SUITE    2:30 PM   (30 min.)   Michelle Carrion MD   [facility]    3:55 PM   (30 min.)   Michelle Carrion MD   Radiation Oncology Clinic     REMOVE RADIATION NEEDLES CERVIX    4:00 PM   Michelle Carrion MD [facility] OR 16     17       18     19     20     21     22     23     24       25     26     27     28     29     30                       Lab Results:  Recent Results (from the past 12 hour(s))   CBC with platelets differential    Collection Time: 05/09/17  6:36 AM   Result Value Ref Range    WBC 5.7 4.0 - 11.0 10e9/L    RBC Count 3.36 (L) 3.8 - 5.2 10e12/L    Hemoglobin 10.2 (L) 11.7 - 15.7 g/dL    Hematocrit 31.2 (L) 35.0 - 47.0 %    MCV 93 78 - 100 fl    MCH 30.4 26.5 - 33.0 pg    MCHC 32.7 31.5 - 36.5 g/dL    RDW 14.8 10.0 - 15.0 %    Platelet Count 436 150 - 450 10e9/L    Diff Method Automated Method     % Neutrophils 72.7 %    % Lymphocytes 16.2 %    % Monocytes 6.6 %    % Eosinophils 2.6 %    % Basophils 0.5 %    % Immature Granulocytes 1.4 %    Nucleated RBCs 0 0 /100    Absolute Neutrophil 4.2 1.6 - 8.3 10e9/L    Absolute Lymphocytes 0.9 0.8 - 5.3 10e9/L    Absolute Monocytes 0.4 0.0 - 1.3 10e9/L    Absolute  Eosinophils 0.2 0.0 - 0.7 10e9/L    Absolute Basophils 0.0 0.0 - 0.2 10e9/L    Abs Immature Granulocytes 0.1 0 - 0.4 10e9/L    Absolute Nucleated RBC 0.0    Comprehensive metabolic panel    Collection Time: 05/09/17  6:36 AM   Result Value Ref Range    Sodium 138 133 - 144 mmol/L    Potassium 3.9 3.4 - 5.3 mmol/L    Chloride 104 94 - 109 mmol/L    Carbon Dioxide 25 20 - 32 mmol/L    Anion Gap 9 3 - 14 mmol/L    Glucose 147 (H) 70 - 99 mg/dL    Urea Nitrogen 13 7 - 30 mg/dL    Creatinine 0.82 0.52 - 1.04 mg/dL    GFR Estimate 68 >60 mL/min/1.7m2    GFR Estimate If Black 82 >60 mL/min/1.7m2    Calcium 8.5 8.5 - 10.1 mg/dL    Bilirubin Total 0.5 0.2 - 1.3 mg/dL    Albumin 2.7 (L) 3.4 - 5.0 g/dL    Protein Total 7.2 6.8 - 8.8 g/dL    Alkaline Phosphatase 138 40 - 150 U/L    ALT 40 0 - 50 U/L    AST 13 0 - 45 U/L   Magnesium    Collection Time: 05/09/17  6:36 AM   Result Value Ref Range    Magnesium 2.1 1.6 - 2.3 mg/dL             Follow-ups after your visit        Your next 10 appointments already scheduled     May 09, 2017  2:30 PM CDT   EXTERNAL RADIATION TREATMENT with P RAD ONC [facility]   Radiation Oncology Clinic ([facility])    [facility]  1st Floor  [address] [address] [phone]            May 10, 2017  2:30 PM CDT   EXTERNAL RADIATION TREATMENT with UMP RAD ONC VARIAN   Radiation Oncology Clinic ([facility])    [facility]  1st Floor  [address] [address] [phone]            May 11, 2017  2:30 PM CDT   EXTERNAL RADIATION TREATMENT with UMP RAD ONC VARIAN   Radiation Oncology Clinic ([facility])    [facility]  1st Floor  [address] [address] [phone]            May 11, 2017  2:45 PM CDT   ON TREATMENT VISIT with Michelle Carrion MD   Radiation Oncology Clinic ([facility])    [facility]  1st  Floor  [address] [address]   [phone]            May 12, 2017  2:30 PM CDT   EXTERNAL RADIATION TREATMENT with UMP RAD ONC VARIAN   Radiation Oncology Clinic ([facility])    [facility]  1st Floor  [address] [address]   [phone]            May 15, 2017  2:30 PM CDT   EXTERNAL RADIATION TREATMENT with UMP RAD ONC VARIAN   Radiation Oncology Clinic ([facility])    [facility]  1st Floor  [address] [address]   [phone]            May 16, 2017  6:30 AM CDT   [facility] Lab Draw with UC [facility] LAB DRAW   [facility] Lab Draw ([facility])    [address]  2nd Floor  [place] [address] [phone]            May 16, 2017  7:00 AM CDT   Infusion 360 with UC ONCOLOGY INFUSION, UC 11 ATC   [facility] ([facility])    [address]  2nd Floor  [facility] [place] [address] [phone]            May 16, 2017  2:30 PM CDT   EXTERNAL RADIATION TREATMENT with UMP RAD ONC VARIAN   Radiation Oncology Clinic ([facility])    [facility]  1st Floor  [address] [address]   [phone]              Who to contact     If you have questions or need follow up information about today's clinic visit or your schedule please contact [facility] directly at [phone].  Normal or non-critical lab and imaging results will be communicated to you by MyChart, letter or phone within 4 business days after the clinic has received the results. If you do not hear from us within 7 days, please contact the clinic through MyChart or phone. If you have a critical or abnormal lab result, we will notify you by phone as soon as possible.  Submit refill requests through [employer] or call your pharmacy and they will forward the refill  "request to us. Please allow 3 business days for your refill to be completed.          Additional Information About Your Visit        MyChart Information     [employer] lets you send messages to your doctor, view your test results, renew your prescriptions, schedule appointments and more. To sign up, go to [email or web address] . Click on \"Log in\" on the left side of the screen, which will take you to the Welcome page. Then click on \"Sign up Now\" on the right side of the page.     You will be asked to enter the access code listed below, as well as some personal information. Please follow the directions to create your username and password.     Your access code is: [email or web address]  Expires: 2017 11:20 AM     Your access code will  in 90 days. If you need help or a new code, please call your [facility] clinic or [phone].        Care EveryWhere ID     This is your [facility] EveryWhere ID. This could be used by other organizations to access your [facility] medical records  [id number]        Your Vitals Were     Pulse Temperature Respirations Pulse Oximetry BMI (Body Mass Index)       95 98.2  F (36.8  C) (Oral) 16 98% 24.96 kg/m2        Blood Pressure from Last 3 Encounters:   17 98/54   17 144/73   17 132/71    Weight from Last 3 Encounters:   17 66 kg (145 lb 8 oz)   17 68.2 kg (150 lb 4.8 oz)   17 68.6 kg (151 lb 4.8 oz)              We Performed the Following     CBC with platelets differential     Comprehensive metabolic panel     Magnesium        Primary Care Provider Office Phone # Fax #    Adán Lanier [phone]       [place] [address] [address] [facility] [address]        Thank you!     Thank you for choosing [facility]  for your care. Our goal is always to provide you with excellent care. Hearing back from our patients is one way we can continue to improve our services. Please take a few minutes to complete the " written survey that you may receive in the mail after your visit with us. Thank you!             Your Updated Medication List - Protect others around you: Learn how to safely use, store and throw away your medicines at www.disposemymeds.org.          This list is accurate as of: 5/9/17  8:19 AM.  Always use your most recent med list.                   Brand Name Dispense Instructions for use    albuterol 108 (90 BASE) MCG/ACT Inhaler    PROAIR HFA/PROVENTIL HFA/VENTOLIN HFA     Inhale 2 puffs into the lungs every 6 hours as needed Reported on 4/20/2017       ASPIRIN PO      Take 81 mg by mouth daily       COREG PO      Take 6.25 mg by mouth 2 times daily       hydrochlorothiazide 25 MG tablet    HYDRODIURIL     daily Reported on 5/5/2017       lisinopril 5 MG tablet    PRINIVIL/ZESTRIL     2 times daily Reported on 5/5/2017       LORazepam 1 MG tablet    ATIVAN    30 tablet    Take 1 tablet (1 mg) by mouth every 6 hours as needed (nausea/vomiting, anxiety or sleep )       MULTIVITAMIN ADULT PO      Take by mouth daily       prochlorperazine 10 MG tablet    COMPAZINE    30 tablet    Take 1 tablet (10 mg) by mouth every 6 hours as needed (nausea/vomiting)       SIMVASTATIN PO      Take 40 mg by mouth once Reported on 5/5/2017       SINGULAIR PO      Take 10 mg by mouth daily

## 2017-05-10 ENCOUNTER — APPOINTMENT (OUTPATIENT)
Dept: RADIATION ONCOLOGY | Facility: CLINIC | Age: 77
End: 2017-05-10
Attending: RADIOLOGY
Payer: MEDICARE

## 2017-05-10 PROCEDURE — 77386 ZZH IMRT TREATMENT DELIVERY, COMPLEX: CPT | Performed by: RADIOLOGY

## 2017-05-11 ENCOUNTER — APPOINTMENT (OUTPATIENT)
Dept: RADIATION ONCOLOGY | Facility: CLINIC | Age: 77
End: 2017-05-11
Attending: RADIOLOGY
Payer: MEDICARE

## 2017-05-11 VITALS — WEIGHT: 150.6 LBS | BODY MASS INDEX: 25.84 KG/M2

## 2017-05-11 DIAGNOSIS — C53.9 MALIGNANT NEOPLASM OF CERVIX, UNSPECIFIED SITE (H): Primary | ICD-10-CM

## 2017-05-11 PROCEDURE — 77386 ZZH IMRT TREATMENT DELIVERY, COMPLEX: CPT | Performed by: RADIOLOGY

## 2017-05-11 NOTE — LETTER
2017       RE: Savannah Camacho  [address] [facility] [place] [address]     Dear Colleague,    Thank you for referring your patient, Savannah Camacho, to the RADIATION ONCOLOGY CLINIC. Please see a copy of my visit note below.    [facility] PHYSICIANS  SPECIALIZING IN BREAKTHROUGHS  Radiation Oncology    On Treatment Visit Note      Savannah Camacho      Date: 2017   MRN: [id number]   : 1940  Diagnosis: Cervical cancer      Reason for Visit:  On Radiation Treatment Visit     Treatment Summary to Date  Treatment Site: pelvis Current Dose: 1620/4500 cGy Fractions:  + 5 T&R      Chemotherapy  Chemo concurrent with radx?: Yes  Oncologist: Dr. Raines  Drug Name/Frequency 1: Cisplatin (started 17)    Subjective:   Patient is doing better. Electrolytes are now within normal limits. Did receive chemo this week.  Moderate fatigue. Some loose stools but no diarrhea. Very mild vaginal discharge.    Nursing ROS:   Nutrition Alteration  Diet Type: Patient's Preference  Nutrition Note: poor appetite  Skin  Skin Reaction: 0 - No changes           Gastrointestinal  Nausea: 0 - None  GI Note: BM soft but not loose.   Genitourinary   Note: No dilator given at this time (small amount of brown discharge. 1 mini pad /day)  Psychosocial  Pyschosocial Note: feeling very tired  Pain Assessment  0-10 Pain Scale: 0      Objective:   Wt 68.3 kg (150 lb 9.6 oz)  BMI 25.84 kg/m2  Gen: Appears well, in no acute distress      Labs:  CBC RESULTS:   Recent Labs   Lab Test  17   0636   WBC  5.7   RBC  3.36*   HGB  10.2*   HCT  31.2*   MCV  93   MCH  30.4   MCHC  32.7   RDW  14.8   PLT  436     ELECTROLYTES:  Recent Labs   Lab Test  17   0636   NA  138   POTASSIUM  3.9   CHLORIDE  104   PEPE  8.5   CO2  25   BUN  13   CR  0.82   GLC  147*       Assessment:    Tolerating radiation therapy well.  All questions and concerns addressed.    Toxicities:  Fatigue: Grade 1: Fatigue relieved by  rest  Diarrhea: Grade 1: Increase of <4 stools per day over baseline; mild increase in ostomy output compared to baseline  Urinary frequency: Grade 0: No toxicity  Urinary urgency: Grade 0: No toxicity    Plan:   1. Continue current therapy.    2. Speculum exam next week to assess treatment response.      Mosaiq chart and setup information reviewed  MVCT/IGRT images checked    Medication Review  Med list reviewed with patient?: Yes    Educational Topic Discussed  Education Instructions: reviewed      Bobby Ward MD    Please do not send letter to referring physician.      I saw and examined the patient with the resident.  I have reviewed and agree with the resident's note and plan of care.      Michelle Carrion MD      Again, thank you for allowing me to participate in the care of your patient.      Sincerely,    Michelle Carrion MD

## 2017-05-11 NOTE — LETTER
Date:May 12, 2017      Provider requested that no letter be sent. Do not send.       [facility] Health Information

## 2017-05-11 NOTE — PROGRESS NOTES
[facility] PHYSICIANS  SPECIALIZING IN BREAKTHROUGHS  Radiation Oncology    On Treatment Visit Note      Savannah Camacho      Date: 2017   MRN: [id number]   : 1940  Diagnosis: Cervical cancer      Reason for Visit:  On Radiation Treatment Visit     Treatment Summary to Date  Treatment Site: pelvis Current Dose: 1620/4500 cGy Fractions:  + 5 T&R      Chemotherapy  Chemo concurrent with radx?: Yes  Oncologist: Dr. Raines  Drug Name/Frequency 1: Cisplatin (started 17)    Subjective:   Patient is doing better. Electrolytes are now within normal limits. Did receive chemo this week.  Moderate fatigue. Some loose stools but no diarrhea. Very mild vaginal discharge.    Nursing ROS:   Nutrition Alteration  Diet Type: Patient's Preference  Nutrition Note: poor appetite  Skin  Skin Reaction: 0 - No changes           Gastrointestinal  Nausea: 0 - None  GI Note: BM soft but not loose.   Genitourinary   Note: No dilator given at this time (small amount of brown discharge. 1 mini pad /day)  Psychosocial  Pyschosocial Note: feeling very tired  Pain Assessment  0-10 Pain Scale: 0      Objective:   Wt 68.3 kg (150 lb 9.6 oz)  BMI 25.84 kg/m2  Gen: Appears well, in no acute distress      Labs:  CBC RESULTS:   Recent Labs   Lab Test  17   0636   WBC  5.7   RBC  3.36*   HGB  10.2*   HCT  31.2*   MCV  93   MCH  30.4   MCHC  32.7   RDW  14.8   PLT  436     ELECTROLYTES:  Recent Labs   Lab Test  17   0636   NA  138   POTASSIUM  3.9   CHLORIDE  104   PEPE  8.5   CO2  25   BUN  13   CR  0.82   GLC  147*       Assessment:    Tolerating radiation therapy well.  All questions and concerns addressed.    Toxicities:  Fatigue: Grade 1: Fatigue relieved by rest  Diarrhea: Grade 1: Increase of <4 stools per day over baseline; mild increase in ostomy output compared to baseline  Urinary frequency: Grade 0: No toxicity  Urinary urgency: Grade 0: No toxicity    Plan:   1. Continue current therapy.     2. Speculum exam next week to assess treatment response.      Mosaiq chart and setup information reviewed  MVCT/IGRT images checked    Medication Review  Med list reviewed with patient?: Yes    Educational Topic Discussed  Education Instructions: reviewed      Bobby Ward MD    Please do not send letter to referring physician.      I saw and examined the patient with the resident.  I have reviewed and agree with the resident's note and plan of care.      Mcihelle Carrion MD

## 2017-05-11 NOTE — MR AVS SNAPSHOT
After Visit Summary   5/11/2017    Savannah Camahco    MRN: [id number]           Patient Information     Date Of Birth          1940        Visit Information        Provider Department      5/11/2017 2:45 PM Michelle Carrion MD [facility]        Today's Diagnoses     Malignant neoplasm of cervix, unspecified site (H)    -  1       Follow-ups after your visit        Your next 10 appointments already scheduled     May 12, 2017  2:30 PM CDT   EXTERNAL RADIATION TREATMENT with UMP RAD ONC VARIAN   Radiation Oncology Clinic ([facility])    [facility]  1st Floor  [address] [address]   [phone]            May 15, 2017  2:30 PM CDT   EXTERNAL RADIATION TREATMENT with UMP RAD ONC VARIAN   Radiation Oncology Clinic ([facility])    [facility]  1st Floor  [address] [address]   [phone]            May 16, 2017  6:30 AM CDT   Masonic Lab Draw with [facility] MASONIC LAB DRAW   [facility] Masonic Lab Draw ([facility])    [address]  2nd Floor  [place] [facility] [address]   [phone]            May 16, 2017  7:00 AM CDT   Infusion 360 with UC ONCOLOGY INFUSION, UC 11 ATC   [facility]    [address]  2nd Floor  [place] [facility] [address]   [phone]            May 16, 2017  2:30 PM CDT   EXTERNAL RADIATION TREATMENT with UMP RAD ONC VARIAN   Radiation Oncology Clinic ([facility])    [facility]  1st Floor  [address] [address]   [phone]            May 17, 2017  2:30 PM CDT   EXTERNAL RADIATION TREATMENT with UMP RAD ONC VARIAN   Radiation Oncology Clinic ([facility])    [facility]  1st Floor  [address] [address]   [phone]            May 17, 2017  2:45 PM CDT    ON TREATMENT VISIT with Michelle Carrion MD   Radiation Oncology Clinic ([facility])    [facility]  1st Floor  [address] [address] [phone]            May 18, 2017  2:30 PM CDT   EXTERNAL RADIATION TREATMENT with [facility] RAD ONC VARIAN   Radiation Oncology Clinic ([facility])    [facility]  1st Floor  [address] [address] [phone]            May 19, 2017  2:30 PM CDT   EXTERNAL RADIATION TREATMENT with [facility] RAD ONC VARIAN   Radiation Oncology Clinic ([facility])    [facility]  1st Floor  [address] [address] [phone]              Who to contact     Please call your clinic at [phone] to:    Ask questions about your health    Make or cancel appointments    Discuss your medicines    Learn about your test results    Speak to your doctor   If you have compliments or concerns about an experience at your clinic, or if you wish to file a complaint, please contact [facility] Patient Relations at [phone] or email us at [email or web address]         Additional Information About Your Visit        [employer] Information     [employer] is an electronic gateway that provides easy, online access to your medical records. With [employer]NearMe, you can request a clinic appointment, read your test results, renew a prescription or communicate with your care team.     To sign up for [employer] visit the website at [email or web address]   You will be asked to enter the access code listed below, as well as some personal information. Please follow the directions to create your username and password.     Your access code is: [email or web address]  Expires: 2017 11:20 AM     Your access code will  in 90 days. If you need help or a new code, please contact your [facility] Clinic or call  [phone] for assistance.        Care EveryWhere ID     This is your Care EveryWhere ID. This could be used by other organizations to access your [facility] medical records  [id number]        Your Vitals Were     BMI (Body Mass Index)                   25.84 kg/m2            Blood Pressure from Last 3 Encounters:   05/09/17 98/54   05/05/17 144/73   05/03/17 132/71    Weight from Last 3 Encounters:   05/11/17 68.3 kg (150 lb 9.6 oz)   05/09/17 66 kg (145 lb 8 oz)   05/05/17 68.2 kg (150 lb 4.8 oz)              Today, you had the following     No orders found for display       Primary Care Provider Office Phone # Fax #    Adán Lanier [phone]       [address] [place] [facility] [address]        Thank you!     Thank you for choosing [facility]  for your care. Our goal is always to provide you with excellent care. Hearing back from our patients is one way we can continue to improve our services. Please take a few minutes to complete the written survey that you may receive in the mail after your visit with us. Thank you!             Your Updated Medication List - Protect others around you: Learn how to safely use, store and throw away your medicines at www.disposemymeds.org.          This list is accurate as of: 5/11/17  4:05 PM.  Always use your most recent med list.                   Brand Name Dispense Instructions for use    albuterol 108 (90 BASE) MCG/ACT Inhaler    PROAIR HFA/PROVENTIL HFA/VENTOLIN HFA     Inhale 2 puffs into the lungs every 6 hours as needed Reported on 4/20/2017       ASPIRIN PO      Take 81 mg by mouth daily       COREG PO      Take 6.25 mg by mouth 2 times daily       hydrochlorothiazide 25 MG tablet    HYDRODIURIL     daily Reported on 5/5/2017       lisinopril 5 MG tablet    PRINIVIL/ZESTRIL     2 times daily Reported on 5/5/2017       LORazepam 1 MG tablet    ATIVAN    30 tablet    Take 1 tablet (1 mg) by mouth every 6 hours as  needed (nausea/vomiting, anxiety or sleep )       MULTIVITAMIN ADULT PO      Take by mouth daily       prochlorperazine 10 MG tablet    COMPAZINE    30 tablet    Take 1 tablet (10 mg) by mouth every 6 hours as needed (nausea/vomiting)       SIMVASTATIN PO      Take 40 mg by mouth once Reported on 5/5/2017       SINGULAIR PO      Take 10 mg by mouth daily

## 2017-05-12 ENCOUNTER — APPOINTMENT (OUTPATIENT)
Dept: RADIATION ONCOLOGY | Facility: CLINIC | Age: 77
End: 2017-05-12
Attending: RADIOLOGY
Payer: MEDICARE

## 2017-05-12 PROCEDURE — 77386 ZZH IMRT TREATMENT DELIVERY, COMPLEX: CPT | Performed by: RADIOLOGY

## 2017-05-12 PROCEDURE — 77336 RADIATION PHYSICS CONSULT: CPT | Performed by: RADIOLOGY

## 2017-05-15 ENCOUNTER — APPOINTMENT (OUTPATIENT)
Dept: RADIATION ONCOLOGY | Facility: CLINIC | Age: 77
End: 2017-05-15
Attending: RADIOLOGY
Payer: MEDICARE

## 2017-05-15 PROCEDURE — 77386 ZZH IMRT TREATMENT DELIVERY, COMPLEX: CPT | Performed by: RADIOLOGY

## 2017-05-16 ENCOUNTER — APPOINTMENT (OUTPATIENT)
Dept: RADIATION ONCOLOGY | Facility: CLINIC | Age: 77
End: 2017-05-16
Attending: RADIOLOGY
Payer: MEDICARE

## 2017-05-16 ENCOUNTER — INFUSION THERAPY VISIT (OUTPATIENT)
Dept: ONCOLOGY | Facility: CLINIC | Age: 77
End: 2017-05-16
Attending: OBSTETRICS & GYNECOLOGY
Payer: MEDICARE

## 2017-05-16 ENCOUNTER — APPOINTMENT (OUTPATIENT)
Dept: LAB | Facility: CLINIC | Age: 77
End: 2017-05-16
Attending: OBSTETRICS & GYNECOLOGY
Payer: MEDICARE

## 2017-05-16 VITALS
DIASTOLIC BLOOD PRESSURE: 64 MMHG | WEIGHT: 144.3 LBS | HEART RATE: 97 BPM | TEMPERATURE: 97.8 F | BODY MASS INDEX: 24.76 KG/M2 | OXYGEN SATURATION: 98 % | SYSTOLIC BLOOD PRESSURE: 103 MMHG

## 2017-05-16 DIAGNOSIS — C53.1 MALIGNANT NEOPLASM OF EXOCERVIX (H): Primary | ICD-10-CM

## 2017-05-16 LAB
ALBUMIN SERPL-MCNC: 2.9 G/DL (ref 3.4–5)
ALP SERPL-CCNC: 103 U/L (ref 40–150)
ALT SERPL W P-5'-P-CCNC: 24 U/L (ref 0–50)
ANION GAP SERPL CALCULATED.3IONS-SCNC: 8 MMOL/L (ref 3–14)
AST SERPL W P-5'-P-CCNC: 17 U/L (ref 0–45)
BASOPHILS # BLD AUTO: 0 10E9/L (ref 0–0.2)
BASOPHILS NFR BLD AUTO: 0.2 %
BILIRUB SERPL-MCNC: 0.3 MG/DL (ref 0.2–1.3)
BUN SERPL-MCNC: 11 MG/DL (ref 7–30)
CALCIUM SERPL-MCNC: 8.5 MG/DL (ref 8.5–10.1)
CHLORIDE SERPL-SCNC: 103 MMOL/L (ref 94–109)
CO2 SERPL-SCNC: 27 MMOL/L (ref 20–32)
CREAT SERPL-MCNC: 0.87 MG/DL (ref 0.52–1.04)
DIFFERENTIAL METHOD BLD: ABNORMAL
EOSINOPHIL # BLD AUTO: 0.6 10E9/L (ref 0–0.7)
EOSINOPHIL NFR BLD AUTO: 13.3 %
ERYTHROCYTE [DISTWIDTH] IN BLOOD BY AUTOMATED COUNT: 14.4 % (ref 10–15)
GFR SERPL CREATININE-BSD FRML MDRD: 63 ML/MIN/1.7M2
GLUCOSE SERPL-MCNC: 112 MG/DL (ref 70–99)
HCT VFR BLD AUTO: 32.4 % (ref 35–47)
HGB BLD-MCNC: 10.6 G/DL (ref 11.7–15.7)
IMM GRANULOCYTES # BLD: 0.1 10E9/L (ref 0–0.4)
IMM GRANULOCYTES NFR BLD: 1.6 %
LYMPHOCYTES # BLD AUTO: 0.6 10E9/L (ref 0.8–5.3)
LYMPHOCYTES NFR BLD AUTO: 12.8 %
MAGNESIUM SERPL-MCNC: 1.9 MG/DL (ref 1.6–2.3)
MCH RBC QN AUTO: 30.3 PG (ref 26.5–33)
MCHC RBC AUTO-ENTMCNC: 32.7 G/DL (ref 31.5–36.5)
MCV RBC AUTO: 93 FL (ref 78–100)
MONOCYTES # BLD AUTO: 0.4 10E9/L (ref 0–1.3)
MONOCYTES NFR BLD AUTO: 9.8 %
NEUTROPHILS # BLD AUTO: 2.7 10E9/L (ref 1.6–8.3)
NEUTROPHILS NFR BLD AUTO: 62.3 %
NRBC # BLD AUTO: 0 10*3/UL
NRBC BLD AUTO-RTO: 0 /100
PLATELET # BLD AUTO: 199 10E9/L (ref 150–450)
POTASSIUM SERPL-SCNC: 3.7 MMOL/L (ref 3.4–5.3)
PROT SERPL-MCNC: 7.4 G/DL (ref 6.8–8.8)
RBC # BLD AUTO: 3.5 10E12/L (ref 3.8–5.2)
SODIUM SERPL-SCNC: 139 MMOL/L (ref 133–144)
WBC # BLD AUTO: 4.3 10E9/L (ref 4–11)

## 2017-05-16 PROCEDURE — 96361 HYDRATE IV INFUSION ADD-ON: CPT

## 2017-05-16 PROCEDURE — 25800025 ZZH RX 258: Mod: ZF | Performed by: OBSTETRICS & GYNECOLOGY

## 2017-05-16 PROCEDURE — 96367 TX/PROPH/DG ADDL SEQ IV INF: CPT

## 2017-05-16 PROCEDURE — 96375 TX/PRO/DX INJ NEW DRUG ADDON: CPT

## 2017-05-16 PROCEDURE — 83735 ASSAY OF MAGNESIUM: CPT | Performed by: OBSTETRICS & GYNECOLOGY

## 2017-05-16 PROCEDURE — 25000128 H RX IP 250 OP 636: Mod: ZF | Performed by: OBSTETRICS & GYNECOLOGY

## 2017-05-16 PROCEDURE — 96413 CHEMO IV INFUSION 1 HR: CPT

## 2017-05-16 PROCEDURE — 77386 ZZH IMRT TREATMENT DELIVERY, COMPLEX: CPT | Performed by: RADIOLOGY

## 2017-05-16 PROCEDURE — 85025 COMPLETE CBC W/AUTO DIFF WBC: CPT | Performed by: OBSTETRICS & GYNECOLOGY

## 2017-05-16 PROCEDURE — 80053 COMPREHEN METABOLIC PANEL: CPT | Performed by: OBSTETRICS & GYNECOLOGY

## 2017-05-16 PROCEDURE — 25000125 ZZHC RX 250: Mod: ZF | Performed by: OBSTETRICS & GYNECOLOGY

## 2017-05-16 RX ORDER — EPINEPHRINE 0.3 MG/.3ML
INJECTION SUBCUTANEOUS
Status: DISCONTINUED
Start: 2017-05-16 | End: 2017-05-16 | Stop reason: WASHOUT

## 2017-05-16 RX ORDER — DEXTROSE, SODIUM CHLORIDE, AND POTASSIUM CHLORIDE 5; .45; .15 G/100ML; G/100ML; G/100ML
2000 INJECTION INTRAVENOUS ONCE
Status: COMPLETED | OUTPATIENT
Start: 2017-05-16 | End: 2017-05-16

## 2017-05-16 RX ORDER — PALONOSETRON 0.05 MG/ML
0.25 INJECTION, SOLUTION INTRAVENOUS ONCE
Status: COMPLETED | OUTPATIENT
Start: 2017-05-16 | End: 2017-05-16

## 2017-05-16 RX ADMIN — CISPLATIN 70 MG: 1 INJECTION, SOLUTION INTRAVENOUS at 09:17

## 2017-05-16 RX ADMIN — SODIUM CHLORIDE 150 MG: 9 INJECTION, SOLUTION INTRAVENOUS at 07:48

## 2017-05-16 RX ADMIN — PALONOSETRON HYDROCHLORIDE 0.25 MG: 0.25 INJECTION INTRAVENOUS at 07:33

## 2017-05-16 RX ADMIN — POTASSIUM CHLORIDE, DEXTROSE MONOHYDRATE AND SODIUM CHLORIDE 2000 ML: 150; 5; 450 INJECTION, SOLUTION INTRAVENOUS at 08:14

## 2017-05-16 RX ADMIN — DEXAMETHASONE SODIUM PHOSPHATE 12 MG: 10 INJECTION, SOLUTION INTRAMUSCULAR; INTRAVENOUS at 07:34

## 2017-05-16 NOTE — PROGRESS NOTES
Infusion Nursing Note:  Savannah Camacho presents today for cycle 1, day 8 cisplatin.    Patient seen by provider today: No   present during visit today: Not Applicable.    Note: Patient states that she had some nausea this week, starting on Friday. She took compazine with relief and has been eating and drinking well.  Currently her stools are on the loose side but not diarrhea. She has had a couple of episodes of diarrhea and is taking imodium prn which has helped.      Intravenous Access:  Peripheral IV placed.    Treatment Conditions:  Lab Results   Component Value Date    HGB 10.6 05/16/2017     Lab Results   Component Value Date    WBC 4.3 05/16/2017      Lab Results   Component Value Date    ANEU 2.7 05/16/2017     Lab Results   Component Value Date     05/16/2017      Lab Results   Component Value Date     05/16/2017                   Lab Results   Component Value Date    POTASSIUM 3.7 05/16/2017           Lab Results   Component Value Date    MAG 1.9 05/16/2017            Lab Results   Component Value Date    CR 0.87 05/16/2017                   Lab Results   Component Value Date    PEPE 8.5 05/16/2017                Lab Results   Component Value Date    BILITOTAL 0.3 05/16/2017           Lab Results   Component Value Date    ALBUMIN 2.9 05/16/2017                    Lab Results   Component Value Date    ALT 24 05/16/2017           Lab Results   Component Value Date    AST 17 05/16/2017     Results reviewed, labs MET treatment parameters, ok to proceed with treatment.      Post Infusion Assessment:  Patient tolerated infusion without incident. Patient voiding in good amounts pre, during, and post cisplatin infusion  Blood return noted pre and post infusion.  Site patent and intact, free from redness, edema or discomfort.  No evidence of extravasations.  Access discontinued per protocol.    Discharge Plan:   Patient declined prescription refills.  Discharge instructions reviewed with:  Patient.  Patient and/or family verbalized understanding of discharge instructions and all questions answered.  Copy of AVS reviewed with patient and/or family.  Patient will return 5/23 for next appointment.  Patient discharged in stable condition accompanied by: .  Departure Mode: Ambulatory.  Face to Face time: 0.    Gianna Barrera RN

## 2017-05-16 NOTE — MR AVS SNAPSHOT
After Visit Summary   5/16/2017    Savannah Camacho    MRN: [id number]           Patient Information     Date Of Birth          1940        Visit Information        Provider Department      5/16/2017 7:00 AM  11 ATC;  ONCOLOGY INFUSION [facility]        Today's Diagnoses     Malignant neoplasm of exocervix (H)    -  1      Care Instructions    Contact Numbers    [facility] Main Line: [phone]  [facility] Triage:  [phone]    Call triage with chills and/or temperature greater than or equal to 100.5, uncontrolled nausea/vomiting, diarrhea, constipation, dizziness, shortness of breath, chest pain, bleeding, unexplained bruising, or any new/concerning symptoms, questions/concerns.     If you are having any concerning symptoms or wish to speak to a provider before your next infusion visit, please call your care coordinator or triage to notify them so we can adequately serve you.       After Hours: [phone]    If after hours, weekends, or holidays, call main hospital  and ask for Oncology doctor on call.           May 2017   [place] Monday Tuesday Wednesday Thursday Friday Saturday        1     US ABD/PEL DUPLEX COMPLETE    7:55 AM   (50 min.)   [id number]   [facility] [place], [facility] EXTERNAL RADIATION TREATMT    2:00 PM   (15 min.)   [facility] RAD ONC VARIAN   Radiation Oncology Clinic 2     P EXTERNAL RADIATION TREATMT    2:30 PM   (15 min.)   P RAD ONC VARIAN   Radiation Oncology Clinic 3     P EXTERNAL RADIATION TREATMT    2:30 PM   (15 min.)   P RAD ONC VARIAN   Radiation Oncology Clinic 4     P EXTERNAL RADIATION TREATMT    2:30 PM   (15 min.)   [facility] RAD ONC VARIAN   Radiation Oncology Clinic     [facility] ON TREATMENT VISIT    2:45 PM   (15 min.)   Michelle Carrion MD   Radiation Oncology Clinic 5     [facility] EXTERNAL RADIATION TREATMT    2:30 PM   (15 min.)   [facility] RAD ONC VARIAN   Radiation Oncology Clinic     LAB    3:00 PM   (15 min.)    LAB    Health Lab     [facility]  RETURN ACTIVE TREATMENT    3:05 PM   (40 min.)   Kerry Hurtado APRN CNP   [facility] 6       7     8     [facility] EXTERNAL RADIATION TREATMT    2:30 PM   (15 min.)   [facility] RAD ONC [facility]   Radiation Oncology [facility] 9     [facility] DRAW    6:30 AM   (15 min.)   [facility]   [facility] Draw     [facility] ONC INFUSION 360    7:00 AM   (360 min.)   [facility] ONCOLOGY INFUSION   [facility]     [facility] EXTERNAL RADIATION TREATMT    2:30 PM   (15 min.)   [facility] RAD ONC [facility]   Radiation Oncology Clinic 10     [facility] EXTERNAL RADIATION TREATMT    2:30 PM   (15 min.)   [facility] RAD ONC [facility]   Radiation Oncology Clinic 11     [facility] EXTERNAL RADIATION TREATMT    2:30 PM   (15 min.)   [facility] RAD ONC [facility]   Radiation Oncology [facility]     [facility] ON TREATMENT VISIT    2:45 PM   (15 min.)   Michelle Carrion MD   [facility] Oncology [facility] 12     [facility] EXTERNAL RADIATION TREATMT    2:30 PM   (15 min.)   [facility] RAD ONC [facility]   Radiation Oncology Clinic 13       14     15     [facility] EXTERNAL RADIATION TREATMT    2:30 PM   (15 min.)   [facility] RAD ONC [facility]   Radiation Oncology Clinic 16     [facility] DRAW    6:30 AM   (15 min.)   [facility] DRAW   [facility] Lab Draw     [facility] ONC INFUSION 360    7:00 AM   (360 min.)   [facility] ONCOLOGY INFUSION   [facility]     [facility] EXTERNAL RADIATION TREATMT    2:30 PM   (15 min.)   [facility] RAD ONC [facility]   Radiation Oncology Clinic 17     [facility] EXTERNAL RADIATION TREATMT    2:30 PM   (15 min.)   [facility] RAD ONC [facility]   Radiation Oncology Clinic     [facility] ON TREATMENT VISIT    2:45 PM   (15 min.)   Michelle Carrion MD   Rad[facility] Oncology Clinic 18     [facility] EXTERNAL RADIATION TREATMT    2:30 PM   (15 min.)   [facility] RAD ONC [facility]   Radiation Oncology Clinic 19     [facility] EXTERNAL RADIATION TREATMT    2:30 PM   (15 min.)   [facility] RAD ONC [facility]   Radiation Oncology Clinic 20       21     22     [facility] EXTERNAL RADIATION TREATMT    2:30 PM   (15 min.)   [facility] RAD ONC [facility]    Radiation Oncology Clinic 23     [facility] LAB DRAW    6:30 AM   (15 min.)    [facility] LAB DRAW   [facility] Lab Draw     [facility] ONC INFUSION 360    7:00 AM   (360 min.)    ONCOLOGY INFUSION   [facility]     [facility] EXTERNAL RADIATION TREATMT    2:30 PM   (15 min.)   [facility] RAD ONC [facility]   Radiation Oncology Clinic 24     [facility] EXTERNAL RADIATION TREATMT    2:30 PM   (15 min.)   [facility] RAD ONC [facility]   Radiation Oncology Clinic 25     [facility] EXTERNAL RADIATION TREATMT    2:30 PM   (15 min.)   [facility] RAD ONC [facility]   Radiation Oncology Clinic     [facility] ON TREATMENT VISIT    2:45 PM   (15 min.)   Micehlle Carrion MD   [facility] Oncology Clinic 26     [facility] EXTERNAL RADIATION TREATMT    2:30 PM   (15 min.)   [facility] RAD ONC [facility]   Radiation Oncology Clinic 27       28     29     30     P [facility] LAB DRAW    6:30 AM   (15 min.)    [facility] LAB DRAW   [facility] Lab Draw     [facility] ONC INFUSION 360    7:00 AM   (360 min.)    ONCOLOGY INFUSION   [facility]     [facility] EXTERNAL RADIATION TREATMT    2:30 PM   (15 min.)   [facility] RAD ONC [facility]   Radiation Oncology [facility]     MR PELVIS WWO    3:00 PM   (60 min.)   UUMR2   [facility] [place], MRI 31     [facility] EXTERNAL RADIATION TREATMT    2:30 PM   (15 min.)   [facility] RAD ONC [facility]   Radiation Oncology Clinic                           June 2017 Sunday Monday Tuesday Wednesday Thursday Friday Saturday                       1     PAC EVAL   11:15 AM   (60 min.)   3, [facility]     PAC RN ASSESSMENT   12:15 PM   (30 min.)   Rn, [facility]     PAC ANESTHESIA CONSULT   12:45 PM   (10 min.)   Anesthesiologist, [facility]     [facility] EXTERNAL RADIATION TREATMT    2:30 PM   (15 min.)   [facility] RAD ONC [facility]   Radiation Oncology Clinic     [facility] ON TREATMENT VISIT    2:45 PM   (15 min.)   Michelle Carrion MD   Radiation Oncology Clinic 2     [facility] EXTERNAL  RADIATION TREATMT    2:30 PM   (15 min.)   [facility] RAD ONC [facility]   Radiation Oncology Clinic 3       4     5     [facility] LAB DRAW    6:30 AM   (15 min.)    [facility] LAB DRAW   [facility] Lab Draw     [facility] ONC INFUSION 360    7:00 AM   (360 min.)    ONCOLOGY INFUSION   [facility] EXTERNAL RADIATION TREATMT    2:30 PM   (15 min.)   [facility] RAD ONC [facility]   Radiation Oncology Clinic 6     7     8     9     10       11     12     13     [facility] TCT/SIM SUITE    6:00 AM   (90 min.)   Nicole Ward MD   Radiation Oncology Clinic     INSERT INTERSTITIAL NEEDLE, ULTRASOUND GUIDED    8:00 AM   Nicole Ward MD   UU OR     [facility] TCT/SIM SUITE   11:00 AM   (60 min.)   Nicole Ward MD   Radiation Oncology Clinic     [facility] TCT/SIM SUITE    3:00 PM   (60 min.)   Nicole Ward MD   Radiation Oncology Clinic 14     [facility] TCT/SIM SUITE    8:00 AM   (90 min.)   Michelle Carrion MD   Radiation Oncology Clinic     [facility] TCT/SIM SUITE    2:30 PM   (60 min.)   Michelle Carrion MD   Radiation Oncology Clinic 15     [facility] TCT/SIM SUITE    8:00 AM   (60 min.)   Michelle Carrion MD   Radiation Oncology Clinic     [facility] TCT/SIM SUITE    2:30 PM   (30 min.)   Michelle Carrion MD   [facility]    3:55 PM   (30 min.)   Michelle Carrion MD   Radiation Oncology Clinic     REMOVE RADIATION NEEDLES CERVIX    4:00 PM   Michelle Carrion MD   [facility] OR 16     17       18     19     20     21     22     23     24       25     26     27     28     29     30                      Recent Results (from the past 24 hour(s))   CBC with platelets differential    Collection Time: 05/16/17  6:57 AM   Result Value Ref Range    WBC 4.3 4.0 - 11.0 10e9/L    RBC Count 3.50 (L) 3.8 - 5.2 10e12/L    Hemoglobin 10.6 (L) 11.7 - 15.7 g/dL    Hematocrit 32.4 (L) 35.0 - 47.0 %    MCV 93 78 - 100 fl    MCH 30.3 26.5 - 33.0 pg    MCHC 32.7 31.5 - 36.5 g/dL    RDW 14.4 10.0 - 15.0 %    Platelet Count  199 150 - 450 10e9/L    Diff Method Automated Method     % Neutrophils 62.3 %    % Lymphocytes 12.8 %    % Monocytes 9.8 %    % Eosinophils 13.3 %    % Basophils 0.2 %    % Immature Granulocytes 1.6 %    Nucleated RBCs 0 0 /100    Absolute Neutrophil 2.7 1.6 - 8.3 10e9/L    Absolute Lymphocytes 0.6 (L) 0.8 - 5.3 10e9/L    Absolute Monocytes 0.4 0.0 - 1.3 10e9/L    Absolute Eosinophils 0.6 0.0 - 0.7 10e9/L    Absolute Basophils 0.0 0.0 - 0.2 10e9/L    Abs Immature Granulocytes 0.1 0 - 0.4 10e9/L    Absolute Nucleated RBC 0.0    Comprehensive metabolic panel    Collection Time: 05/16/17  6:57 AM   Result Value Ref Range    Sodium 139 133 - 144 mmol/L    Potassium 3.7 3.4 - 5.3 mmol/L    Chloride 103 94 - 109 mmol/L    Carbon Dioxide 27 20 - 32 mmol/L    Anion Gap 8 3 - 14 mmol/L    Glucose 112 (H) 70 - 99 mg/dL    Urea Nitrogen 11 7 - 30 mg/dL    Creatinine 0.87 0.52 - 1.04 mg/dL    GFR Estimate 63 >60 mL/min/1.7m2    GFR Estimate If Black 77 >60 mL/min/1.7m2    Calcium 8.5 8.5 - 10.1 mg/dL    Bilirubin Total 0.3 0.2 - 1.3 mg/dL    Albumin 2.9 (L) 3.4 - 5.0 g/dL    Protein Total 7.4 6.8 - 8.8 g/dL    Alkaline Phosphatase 103 40 - 150 U/L    ALT 24 0 - 50 U/L    AST 17 0 - 45 U/L   Magnesium    Collection Time: 05/16/17  6:57 AM   Result Value Ref Range    Magnesium 1.9 1.6 - 2.3 mg/dL               Follow-ups after your visit        Your next 10 appointments already scheduled     May 16, 2017  2:30 PM CDT   EXTERNAL RADIATION TREATMENT with [facility] RAD ONC VARIAN   Radiation Oncology Clinic ([facility])    [facility]  1st Floor  [address] [address] [phone]            May 17, 2017  2:30 PM CDT   EXTERNAL RADIATION TREATMENT with [facility] RAD ONC VARIAN   Radiation Oncology Clinic ([facility])    [facility]  1st Floor  [address] [address]   [phone]            May 17, 2017  2:45 PM CDT   ON TREATMENT  VISIT with Michelle Carrion MD   Radiation Oncology Clinic ([facility])    [facility]  1st Floor  [address] [address]   [phone]            May 18, 2017  2:30 PM CDT   EXTERNAL RADIATION TREATMENT with [facility] RAD ONC VARIAN   Radiation Oncology Clinic ([facility])    [facility]  1st Floor  [address] [address]   [phone]            May 19, 2017  2:30 PM CDT   EXTERNAL RADIATION TREATMENT with [facility] RAD ONC VARIAN   Radiation Oncology Clinic ([facility])    [facility]  1st Floor  [address] [address]   [phone]            May 22, 2017  2:30 PM CDT   EXTERNAL RADIATION TREATMENT with [facility] RAD ONC VARIAN   Radiation Oncology Clinic ([facility])    [facility]  1st Floor  [address] [address]   [phone]            May 23, 2017  6:30 AM CDT   [facility] Draw with [facility] DRAW   [facility] Lab Draw ([facility])    [address]  [address] [address]   [phone]            May 23, 2017  7:00 AM CDT   Infusion 360 with [facility] ONCOLOGY INFUSION, [facility] 10 ATC   [facility] ([facility])    [address]  [address] [facility] [address]   [phone]            May 23, 2017  2:30 PM CDT   EXTERNAL RADIATION TREATMENT with [facility] RAD ONC VARIAN   Radiation Oncology Clinic ([facility])    [facility]  1st Floor  [address] [address]   [phone]              Who to contact     If you have questions or need follow up information about today's clinic visit or your schedule please contact [facility] directly at [phone].  Normal or non-critical lab and imaging results will be communicated to you by  "MyChart, letter or phone within 4 business days after the clinic has received the results. If you do not hear from us within 7 days, please contact the clinic through [employer] or phone. If you have a critical or abnormal lab result, we will notify you by phone as soon as possible.  Submit refill requests through [employer] or call your pharmacy and they will forward the refill request to us. Please allow 3 business days for your refill to be completed.          Additional Information About Your Visit        [email or web address] Information     [employer] lets you send messages to your doctor, view your test results, renew your prescriptions, schedule appointments and more. To sign up, go to [email or web address] . Click on \"Log in\" on the left side of the screen, which will take you to the Welcome page. Then click on \"Sign up Now\" on the right side of the page.     You will be asked to enter the access code listed below, as well as some personal information. Please follow the directions to create your username and password.     Your access code is: [email or web address]  Expires: 2017 11:20 AM     Your access code will  in 90 days. If you need help or a new code, please call your [place] clinic or [phone].        Care EveryWhere ID     This is your Care EveryWhere ID. This could be used by other organizations to access your [place] medical records  [id number]        Your Vitals Were     Pulse Temperature Pulse Oximetry BMI (Body Mass Index)          97 97.8  F (36.6  C) (Oral) 98% 24.76 kg/m2         Blood Pressure from Last 3 Encounters:   17 103/64   17 98/54   17 144/73    Weight from Last 3 Encounters:   17 65.5 kg (144 lb 4.8 oz)   17 68.3 kg (150 lb 9.6 oz)   17 66 kg (145 lb 8 oz)              We Performed the Following     CBC with platelets differential     Comprehensive metabolic panel     Electrolyte Replacement     Magnesium     Nursing Communication 1     Treatment " Conditions        Primary Care Provider Office Phone # Fax #    Adán Lanier [phone]       [place] [address] [address] [facility] [address]        Thank you!     Thank you for choosing [facility]  for your care. Our goal is always to provide you with excellent care. Hearing back from our patients is one way we can continue to improve our services. Please take a few minutes to complete the written survey that you may receive in the mail after your visit with us. Thank you!             Your Updated Medication List - Protect others around you: Learn how to safely use, store and throw away your medicines at www.disposemymeds.org.          This list is accurate as of: 5/16/17 10:57 AM.  Always use your most recent med list.                   Brand Name Dispense Instructions for use    albuterol 108 (90 BASE) MCG/ACT Inhaler    PROAIR HFA/PROVENTIL HFA/VENTOLIN HFA     Inhale 2 puffs into the lungs every 6 hours as needed Reported on 4/20/2017       ASPIRIN PO      Take 81 mg by mouth daily       COREG PO      Take 6.25 mg by mouth 2 times daily       hydrochlorothiazide 25 MG tablet    HYDRODIURIL     daily Reported on 5/16/2017       lisinopril 5 MG tablet    PRINIVIL/ZESTRIL     2 times daily Reported on 5/5/2017       LORazepam 1 MG tablet    ATIVAN    30 tablet    Take 1 tablet (1 mg) by mouth every 6 hours as needed (nausea/vomiting, anxiety or sleep )       MULTIVITAMIN ADULT PO      Take by mouth daily       prochlorperazine 10 MG tablet    COMPAZINE    30 tablet    Take 1 tablet (10 mg) by mouth every 6 hours as needed (nausea/vomiting)       SIMVASTATIN PO      Take 40 mg by mouth once Reported on 5/16/2017       SINGULAIR PO      Take 10 mg by mouth daily Reported on 5/16/2017

## 2017-05-16 NOTE — NURSING NOTE
Chief Complaint   Patient presents with     Blood Draw     labs drawn     Vitals taken. Labs drawn peripherally. Patient tolerated well. Checked in for next appointment.     Rena Menjivar

## 2017-05-16 NOTE — PATIENT INSTRUCTIONS
Contact Numbers    [facility] Main Line: [phone]  [facility] Triage:  [phone]    Call triage with chills and/or temperature greater than or equal to 100.5, uncontrolled nausea/vomiting, diarrhea, constipation, dizziness, shortness of breath, chest pain, bleeding, unexplained bruising, or any new/concerning symptoms, questions/concerns.     If you are having any concerning symptoms or wish to speak to a provider before your next infusion visit, please call your care coordinator or triage to notify them so we can adequately serve you.       After Hours: [phone]    If after hours, weekends, or holidays, call main hospital  and ask for Oncology doctor on call.           May 2017   [place] Monday Tuesday Wednesday Thursday Friday Saturday        1     US ABD/PEL DUPLEX COMPLETE    7:55 AM   (50 min.)   [id number]   [facility], [place], [facility], [place]     UMP EXTERNAL RADIATION TREATMT    2:00 PM   (15 min.)   UMP RAD ONC VARIAN   Radiation Oncology Clinic 2     UMP EXTERNAL RADIATION TREATMT    2:30 PM   (15 min.)   UMP RAD ONC VARIAN   Radiation Oncology Clinic 3     UMP EXTERNAL RADIATION TREATMT    2:30 PM   (15 min.)   UMP RAD ONC VARIAN   Radiation Oncology Clinic 4     UMP EXTERNAL RADIATION TREATMT    2:30 PM   (15 min.)   UMP RAD ONC VARIAN   Radiation Oncology Clinic     UMP ON TREATMENT VISIT    2:45 PM   (15 min.)   Michelle Carrion MD   Radiation Oncology Clinic 5     UMP EXTERNAL RADIATION TREATMT    2:30 PM   (15 min.)   UMP RAD ONC VARIAN   Radiation Oncology Clinic     LAB    3:00 PM   (15 min.)    LAB   [facility]     UMP RETURN ACTIVE TREATMENT    3:05 PM   (40 min.)   Kerry Hurtado APRN CNP   [facility] 6       7     8     UMP EXTERNAL RADIATION TREATMT    2:30 PM   (15 min.)   P RAD ONC VARIAN   Radiation Oncology Clinic 9     [facility] LAB DRAW    6:30 AM   (15 min.)    MASONIC LAB DRAW   [facility] Lab Draw     UMP ONC INFUSION 360    7:00 AM   (360 min.)   GIRISH  ONCOLOGY INFUSION   [facility]     UMP EXTERNAL RADIATION TREATMT    2:30 PM   (15 min.)   UMP RAD ONC [facility]   Radiation Oncology Clinic 10     UMP EXTERNAL RADIATION TREATMT    2:30 PM   (15 min.)   UMP RAD ONC [facility]   Radiation Oncology Clinic 11     UMP EXTERNAL RADIATION TREATMT    2:30 PM   (15 min.)   UMP RAD ONC [facility]   Radiation Oncology Clinic     UMP ON TREATMENT VISIT    2:45 PM   (15 min.)   Michelle Carrion MD   Radiation Oncology Clinic 12     UMP EXTERNAL RADIATION TREATMT    2:30 PM   (15 min.)   UMP RAD ONC [facility]   Radiation Oncology Clinic 13       14     15     UMP EXTERNAL RADIATION TREATMT    2:30 PM   (15 min.)   UMP RAD ONC VARIAN   Radiation Oncology Clinic 16     [facility] LAB DRAW    6:30 AM   (15 min.)   [facility] DRAW   [facility] Draw     P ONC INFUSION 360    7:00 AM   (360 min.)   UC ONCOLOGY INFUSION   [facility]     UMP EXTERNAL RADIATION TREATMT    2:30 PM   (15 min.)   UMP RAD ONC [facility]   Radiation Oncology Clinic 17     UMP EXTERNAL RADIATION TREATMT    2:30 PM   (15 min.)   UMP RAD ONC [facility]   Radiation Oncology Clinic     UMP ON TREATMENT VISIT    2:45 PM   (15 min.)   Michelle Carrion MD   Radiation Oncology Clinic 18     UMP EXTERNAL RADIATION TREATMT    2:30 PM   (15 min.)   UMP RAD ONC [facility]   Radiation Oncology Clinic 19     UMP EXTERNAL RADIATION TREATMT    2:30 PM   (15 min.)   UMP RAD ONC [facility]   Radiation Oncology Clinic 20       21     22     UMP EXTERNAL RADIATION TREATMT    2:30 PM   (15 min.)   UMP RAD ONC [facility]   Radiation Oncology Clinic 23     [facility] LAB DRAW    6:30 AM   (15 min.)   [facility] LAB DRAW   [facility] Lab Draw     P ONC INFUSION 360    7:00 AM   (360 min.)   UC ONCOLOGY INFUSION   [facility]     UMP EXTERNAL RADIATION TREATMT    2:30 PM   (15 min.)   UMP RAD ONC [facility]   Radiation Oncology Clinic 24     UMP EXTERNAL RADIATION TREATMT    2:30 PM   (15  min.)   UMP RAD ONC [facility]   Radiation Oncology Clinic 25     UMP EXTERNAL RADIATION TREATMT    2:30 PM   (15 min.)   UMP RAD ONC VARIAN   Radiation Oncology Clinic     [facility] ON TREATMENT VISIT    2:45 PM   (15 min.)   Michelle Carrion MD   Radiation Oncology Clinic 26     UMP EXTERNAL RADIATION TREATMT    2:30 PM   (15 min.)   UMP RAD ONC [facility]   Radiation Oncology Clinic 27       28     29     30     [facility] LAB DRAW    6:30 AM   (15 min.)    MASONIC LAB DRAW   [facility] Lab Draw     UMP ONC INFUSION 360    7:00 AM   (360 min.)   UC ONCOLOGY INFUSION   [facility]     UMP EXTERNAL RADIATION TREATMT    2:30 PM   (15 min.)   UMP RAD ONC [facility]   Radiation Oncology Clinic     MR PELVIS WWO    3:00 PM   (60 min.)   [id number]   [facility] [place], MRI 31     UMP EXTERNAL RADIATION TREATMT    2:30 PM   (15 min.)   UMP RAD ONC [facility]   Radiation Oncology Clinic                           June 2017 Sunday Monday Tuesday Wednesday Thursday Friday Saturday                       1     PAC EVAL   11:15 AM   (60 min.)   3,  Pac [place]   [facility]     PAC RN ASSESSMENT   12:15 PM   (30 min.)   Rn,  Pac   [facility]     PAC ANESTHESIA CONSULT   12:45 PM   (10 min.)   Anesthesiologist,  Pac   [facility]     P EXTERNAL RADIATION TREATMT    2:30 PM   (15 min.)   UMP RAD ONC [facility]   Radiation Oncology Clinic     [facility] ON TREATMENT VISIT    2:45 PM   (15 min.)   Michelle Carrion MD   Radiation Oncology Clinic 2     UMP EXTERNAL RADIATION TREATMT    2:30 PM   (15 min.)   UMP RAD ONC [facility]   Radiation Oncology Clinic 3       4     5     [facility] LAB DRAW    6:30 AM   (15 min.)    MASONIC LAB DRAW   [facility] Lab Draw     UMP ONC INFUSION 360    7:00 AM   (360 min.)   UC ONCOLOGY INFUSION   [facility]     UMP EXTERNAL RADIATION TREATMT    2:30 PM   (15 min.)   UMP RAD ONC VARIAN   Radiation  Oncology Clinic 6     7     8     9     10       11     12     13     [facility] TCT/SIM SUITE    6:00 AM   (90 min.)   Nicole Ward MD   Radiation Oncology Clinic     INSERT INTERSTITIAL NEEDLE, ULTRASOUND GUIDED    8:00 AM   Nicole Ward MD [facility] OR     [facility] TCT/SIM SUITE   11:00 AM   (60 min.)   Nicole Ward MD   Radiation Oncology Clinic     [facility] TCT/SIM SUITE    3:00 PM   (60 min.)   Nicole Ward MD   Radiation Oncology Clinic 14     [facility] TCT/SIM SUITE    8:00 AM   (90 min.)   Michelle Carrion MD   [facility]    2:30 PM   (60 min.)   Michelle Carrion MD   Radiation Oncology Clinic 15     [facility] TCT/SIM SUITE    8:00 AM   (60 min.)   Michelle Carrion MD   Radiation Oncology [facility] TCT/SIM SUITE    2:30 PM   (30 min.)   Michelle Carrion MD   [facility]    3:55 PM   (30 min.)   Michelle Carrion MD   Radiation Oncology Clinic     REMOVE RADIATION NEEDLES CERVIX    4:00 PM   Michelle Carrion MD [facility] OR 16     17       18     19     20     21     22     23     24       25     26     27     28     29     30                      Recent Results (from the past 24 hour(s))   CBC with platelets differential    Collection Time: 05/16/17  6:57 AM   Result Value Ref Range    WBC 4.3 4.0 - 11.0 10e9/L    RBC Count 3.50 (L) 3.8 - 5.2 10e12/L    Hemoglobin 10.6 (L) 11.7 - 15.7 g/dL    Hematocrit 32.4 (L) 35.0 - 47.0 %    MCV 93 78 - 100 fl    MCH 30.3 26.5 - 33.0 pg    MCHC 32.7 31.5 - 36.5 g/dL    RDW 14.4 10.0 - 15.0 %    Platelet Count 199 150 - 450 10e9/L    Diff Method Automated Method     % Neutrophils 62.3 %    % Lymphocytes 12.8 %    % Monocytes 9.8 %    % Eosinophils 13.3 %    % Basophils 0.2 %    % Immature Granulocytes 1.6 %    Nucleated RBCs 0 0 /100    Absolute Neutrophil 2.7 1.6 - 8.3 10e9/L    Absolute Lymphocytes 0.6 (L) 0.8 - 5.3 10e9/L    Absolute Monocytes 0.4 0.0 - 1.3 10e9/L    Absolute Eosinophils 0.6 0.0 - 0.7  10e9/L    Absolute Basophils 0.0 0.0 - 0.2 10e9/L    Abs Immature Granulocytes 0.1 0 - 0.4 10e9/L    Absolute Nucleated RBC 0.0    Comprehensive metabolic panel    Collection Time: 05/16/17  6:57 AM   Result Value Ref Range    Sodium 139 133 - 144 mmol/L    Potassium 3.7 3.4 - 5.3 mmol/L    Chloride 103 94 - 109 mmol/L    Carbon Dioxide 27 20 - 32 mmol/L    Anion Gap 8 3 - 14 mmol/L    Glucose 112 (H) 70 - 99 mg/dL    Urea Nitrogen 11 7 - 30 mg/dL    Creatinine 0.87 0.52 - 1.04 mg/dL    GFR Estimate 63 >60 mL/min/1.7m2    GFR Estimate If Black 77 >60 mL/min/1.7m2    Calcium 8.5 8.5 - 10.1 mg/dL    Bilirubin Total 0.3 0.2 - 1.3 mg/dL    Albumin 2.9 (L) 3.4 - 5.0 g/dL    Protein Total 7.4 6.8 - 8.8 g/dL    Alkaline Phosphatase 103 40 - 150 U/L    ALT 24 0 - 50 U/L    AST 17 0 - 45 U/L   Magnesium    Collection Time: 05/16/17  6:57 AM   Result Value Ref Range    Magnesium 1.9 1.6 - 2.3 mg/dL

## 2017-05-17 ENCOUNTER — APPOINTMENT (OUTPATIENT)
Dept: RADIATION ONCOLOGY | Facility: CLINIC | Age: 77
End: 2017-05-17
Attending: RADIOLOGY
Payer: MEDICARE

## 2017-05-17 VITALS — BODY MASS INDEX: 25.82 KG/M2 | WEIGHT: 150.5 LBS

## 2017-05-17 DIAGNOSIS — C53.1 MALIGNANT NEOPLASM OF EXOCERVIX (H): Primary | ICD-10-CM

## 2017-05-17 PROCEDURE — 77386 ZZH IMRT TREATMENT DELIVERY, COMPLEX: CPT | Performed by: RADIOLOGY

## 2017-05-17 NOTE — MR AVS SNAPSHOT
After Visit Summary   5/17/2017    Savannah Camacho    MRN: [id number]           Patient Information     Date Of Birth          1940        Visit Information        Provider Department      5/17/2017 2:45 PM Michelle Carrion MD Radiation Oncology Clinic        Today's Diagnoses     Malignant neoplasm of exocervix (H)    -  1       Follow-ups after your visit        Your next 10 appointments already scheduled     May 18, 2017  2:30 PM CDT   EXTERNAL RADIATION TREATMENT with UMP RAD ONC VARIAN   Radiation Oncology Clinic ([facility])    [facility]  1st Floor  [address] [address]   [phone]            May 19, 2017  2:30 PM CDT   EXTERNAL RADIATION TREATMENT with UMP RAD ONC VARIAN   Radiation Oncology Clinic ([facility])    [facility]  1st Floor  [address] [address]   [phone]            May 22, 2017  2:30 PM CDT   EXTERNAL RADIATION TREATMENT with UMP RAD ONC VARIAN   Radiation Oncology Clinic ([facility])    [facility]  1st Floor  [address] [address]   [phone]            May 23, 2017  6:30 AM CDT   [facility] Lab Draw with [facility] LAB DRAW   [facility]    [address]  2nd Floor  [place] [address]   [phone]            May 23, 2017  7:00 AM CDT   Infusion 360 with UC ONCOLOGY INFUSION, [facility] 10 ATC   [facility])    [address]  2nd Floor  [place] [facility] [address]   [phone]            May 23, 2017  2:30 PM CDT   EXTERNAL RADIATION TREATMENT with UMP RAD ONC VARIAN   Radiation Oncology Clinic ([facility])    [facility]  1st Floor  [address] [address]   [phone]            May 24, 2017  2:30 PM CDT   EXTERNAL  RADIATION TREATMENT with [facility] RAD ONC VARIAN   Radiation Oncology Clinic ([facility])    [facility]  1st Floor  [address] [address]   [phone]            May 25, 2017  2:30 PM CDT   EXTERNAL RADIATION TREATMENT with [facility] RAD ONC VARIAN   Radiation Oncology Clinic ([facility])    [facility]  1st Floor  [address] [address] [phone]            May 25, 2017  2:45 PM CDT   ON TREATMENT VISIT with Michelle Carrion MD   Radiation Oncology Clinic ([facility])    [facility]  1st Floor  [address] [address] [phone]              Who to contact     Please call your clinic at [phone] to:    Ask questions about your health    Make or cancel appointments    Discuss your medicines    Learn about your test results    Speak to your doctor   If you have compliments or concerns about an experience at your clinic, or if you wish to file a complaint, please contact [facility] Patient Relations at [phone] or email us at [email or web address]         Additional Information About Your Visit        [employer] Information     [employer] is an electronic gateway that provides easy, online access to your medical records. With [employer], you can request a clinic appointment, read your test results, renew a prescription or communicate with your care team.     To sign up for [employer] visit the website at [email or web address]   You will be asked to enter the access code listed below, as well as some personal information. Please follow the directions to create your username and password.     Your access code is: [email or web address]  Expires: 2017 11:20 AM     Your access code will  in 90 days. If you need help or a new code, please contact your [facility] or call [phone] for  assistance.        Care EveryWhere ID     This is your Care EveryWhere ID. This could be used by other organizations to access your [facility] medical records  [id number]        Your Vitals Were     BMI (Body Mass Index)                   25.82 kg/m2            Blood Pressure from Last 3 Encounters:   05/16/17 103/64   05/09/17 98/54   05/05/17 144/73    Weight from Last 3 Encounters:   05/17/17 68.3 kg (150 lb 8 oz)   05/16/17 65.5 kg (144 lb 4.8 oz)   05/11/17 68.3 kg (150 lb 9.6 oz)              Today, you had the following     No orders found for display       Primary Care Provider Office Phone # Fax #    Adán Lanier [phone]       [address] [place] [facility] [address]        Thank you!     Thank you for choosing [facility]ATION ONCOLOGY CLINIC  for your care. Our goal is always to provide you with excellent care. Hearing back from our patients is one way we can continue to improve our services. Please take a few minutes to complete the written survey that you may receive in the mail after your visit with us. Thank you!             Your Updated Medication List - Protect others around you: Learn how to safely use, store and throw away your medicines at www.disposemymeds.org.          This list is accurate as of: 5/17/17  4:24 PM.  Always use your most recent med list.                   Brand Name Dispense Instructions for use    albuterol 108 (90 BASE) MCG/ACT Inhaler    PROAIR HFA/PROVENTIL HFA/VENTOLIN HFA     Inhale 2 puffs into the lungs every 6 hours as needed Reported on 4/20/2017       ASPIRIN PO      Take 81 mg by mouth daily       COREG PO      Take 6.25 mg by mouth 2 times daily       hydrochlorothiazide 25 MG tablet    HYDRODIURIL     daily On hold for now 5/17/17, Reported on 5/16/2017       lisinopril 5 MG tablet    PRINIVIL/ZESTRIL     2 times daily Reported on 5/5/2017       LORazepam 1 MG tablet    ATIVAN    30 tablet    Take 1 tablet (1 mg) by mouth every  6 hours as needed (nausea/vomiting, anxiety or sleep )       MULTIVITAMIN ADULT PO      Take by mouth daily       prochlorperazine 10 MG tablet    COMPAZINE    30 tablet    Take 1 tablet (10 mg) by mouth every 6 hours as needed (nausea/vomiting)       SIMVASTATIN PO      Take 40 mg by mouth once On hold 5/17/17 per Gyn onc  Reported on 5/16/2017       SINGULAIR PO      Take 10 mg by mouth daily Reported on 5/16/2017

## 2017-05-17 NOTE — PROGRESS NOTES
[facility] PHYSICIANS  SPECIALIZING IN BREAKTHROUGHS  Radiation Oncology    On Treatment Visit Note      Savannah Camacho      Date: 2017   MRN: [id number]   : 1940  Diagnosis: Cervical cancer      Reason for Visit:  On Radiation Treatment Visit     Treatment Summary to Date  Treatment Site: pelvis Current Dose: 2340/4500cGy cGy Fractions: 13/25fx + 5 T&R      Chemotherapy  Chemo concurrent with radx?: Yes  Oncologist: Dr. Raines  Drug Name/Frequency 1: Cisplatin (started 17)    Subjective:   Patient continues to do well without any significant issues.  Electrolytes now within normal limits.  No vaginal bleeding.     Nursing ROS:   Nutrition Alteration  Diet Type: Patient's Preference  Nutrition Note: pushing fluids  Skin  Skin Reaction: 0 - No changes           Gastrointestinal  Nausea: 0 - None  Diarrhea: 0 - None  GI Note: took imodium last week-      Psychosocial  Pyschosocial Note: tired  Pain Assessment  0-10 Pain Scale: 0      Objective:   Wt 68.3 kg (150 lb 8 oz)  BMI 25.82 kg/m2  Gen: Appears well, in no acute distress  Gyn: Speculum exam shows whitish coating over an erythematous patch over the anterior vagina. On bimanual exam, I am still unable to feel the cervix.  Rather a blind pouch is felt at the proximal vagina.     Labs:  CBC RESULTS:   Recent Labs   Lab Test  17   0657   WBC  4.3   RBC  3.50*   HGB  10.6*   HCT  32.4*   MCV  93   MCH  30.3   MCHC  32.7   RDW  14.4   PLT  199     ELECTROLYTES:  Recent Labs   Lab Test  17   0657   NA  139   POTASSIUM  3.7   CHLORIDE  103   PEPE  8.5   CO2  27   BUN  11   CR  0.87   GLC  112*       Assessment:    Tolerating radiation therapy well.  All questions and concerns addressed.    Toxicities:  Diarrhea: Grade 0: No toxicity  Urinary frequency: Grade 0: No toxicity    Plan:   1. Continue current therapy.    2. Provided a vaginal dilator.       Fanzo chart and setup information reviewed  MVCT/IGRT images  checked    Medication Review  Med list reviewed with patient?: Yes    Educational Topic Discussed  Education Instructions: reviewed        Bobby Ward MD    Please do not send letter to referring physician.

## 2017-05-17 NOTE — LETTER
Date:May 18, 2017      Provider requested that no letter be sent. Do not send.       [facility] Health Information

## 2017-05-17 NOTE — LETTER
2017       RE: Savannah Camacho  [address] [facility] [place] [address]     Dear Colleague,    Thank you for referring your patient, Savannah Camacho, to the RADIATION ONCOLOGY CLINIC. Please see a copy of my visit note below.    [facility] PHYSICIANS  SPECIALIZING IN BREAKTHROUGHS  Radiation Oncology    On Treatment Visit Note      Savannah Camacho      Date: 2017   MRN: [id number]   : 1940  Diagnosis: Cervical cancer      Reason for Visit:  On Radiation Treatment Visit     Treatment Summary to Date  Treatment Site: pelvis Current Dose: 2340/4500cGy cGy Fractions: fx + 5 T&R      Chemotherapy  Chemo concurrent with radx?: Yes  Oncologist: Dr. Raines  Drug Name/Frequency 1: Cisplatin (started 17)    Subjective:   Patient continues to do well without any significant issues.  Electrolytes now within normal limits.  No vaginal bleeding.     Nursing ROS:   Nutrition Alteration  Diet Type: Patient's Preference  Nutrition Note: pushing fluids  Skin  Skin Reaction: 0 - No changes           Gastrointestinal  Nausea: 0 - None  Diarrhea: 0 - None  GI Note: took imodium last week-      Psychosocial  Pyschosocial Note: tired  Pain Assessment  0-10 Pain Scale: 0      Objective:   Wt 68.3 kg (150 lb 8 oz)  BMI 25.82 kg/m2  Gen: Appears well, in no acute distress  Gyn: Speculum exam shows whitish coating over an erythematous patch over the anterior vagina. On bimanual exam, I am still unable to feel the cervix.  Rather a blind pouch is felt at the proximal vagina.     Labs:  CBC RESULTS:   Recent Labs   Lab Test  17   0657   WBC  4.3   RBC  3.50*   HGB  10.6*   HCT  32.4*   MCV  93   MCH  30.3   MCHC  32.7   RDW  14.4   PLT  199     ELECTROLYTES:  Recent Labs   Lab Test  17   0657   NA  139   POTASSIUM  3.7   CHLORIDE  103   PEPE  8.5   CO2  27   BUN  11   CR  0.87   GLC  112*       Assessment:    Tolerating radiation therapy well.  All questions and concerns  addressed.    Toxicities:  Diarrhea: Grade 0: No toxicity  Urinary frequency: Grade 0: No toxicity    Plan:   1. Continue current therapy.    2. Provided a vaginal dilator.       Mosaiq chart and setup information reviewed  MVCT/IGRT images checked    Medication Review  Med list reviewed with patient?: Yes    Educational Topic Discussed  Education Instructions: reviewed        Bobby Ward MD    Please do not send letter to referring physician.        Again, thank you for allowing me to participate in the care of your patient.      Sincerely,    Michelle Carrion MD

## 2017-05-18 ENCOUNTER — APPOINTMENT (OUTPATIENT)
Dept: RADIATION ONCOLOGY | Facility: CLINIC | Age: 77
End: 2017-05-18
Attending: RADIOLOGY
Payer: MEDICARE

## 2017-05-18 PROCEDURE — 77386 ZZH IMRT TREATMENT DELIVERY, COMPLEX: CPT | Performed by: RADIOLOGY

## 2017-05-19 ENCOUNTER — APPOINTMENT (OUTPATIENT)
Dept: RADIATION ONCOLOGY | Facility: CLINIC | Age: 77
End: 2017-05-19
Attending: RADIOLOGY
Payer: MEDICARE

## 2017-05-19 LAB — COPATH REPORT: NORMAL

## 2017-05-19 PROCEDURE — 77386 ZZH IMRT TREATMENT DELIVERY, COMPLEX: CPT | Performed by: RADIOLOGY

## 2017-05-19 PROCEDURE — 77336 RADIATION PHYSICS CONSULT: CPT | Performed by: RADIOLOGY

## 2017-05-22 ENCOUNTER — APPOINTMENT (OUTPATIENT)
Dept: RADIATION ONCOLOGY | Facility: CLINIC | Age: 77
End: 2017-05-22
Attending: RADIOLOGY
Payer: MEDICARE

## 2017-05-22 PROCEDURE — 77386 ZZH IMRT TREATMENT DELIVERY, COMPLEX: CPT | Performed by: RADIOLOGY

## 2017-05-23 ENCOUNTER — INFUSION THERAPY VISIT (OUTPATIENT)
Dept: ONCOLOGY | Facility: CLINIC | Age: 77
End: 2017-05-23
Attending: OBSTETRICS & GYNECOLOGY
Payer: MEDICARE

## 2017-05-23 ENCOUNTER — APPOINTMENT (OUTPATIENT)
Dept: RADIATION ONCOLOGY | Facility: CLINIC | Age: 77
End: 2017-05-23
Attending: RADIOLOGY
Payer: MEDICARE

## 2017-05-23 ENCOUNTER — APPOINTMENT (OUTPATIENT)
Dept: LAB | Facility: CLINIC | Age: 77
End: 2017-05-23
Attending: OBSTETRICS & GYNECOLOGY
Payer: MEDICARE

## 2017-05-23 VITALS
WEIGHT: 143.7 LBS | OXYGEN SATURATION: 99 % | RESPIRATION RATE: 16 BRPM | DIASTOLIC BLOOD PRESSURE: 70 MMHG | BODY MASS INDEX: 24.65 KG/M2 | TEMPERATURE: 96.4 F | HEART RATE: 80 BPM | SYSTOLIC BLOOD PRESSURE: 115 MMHG

## 2017-05-23 DIAGNOSIS — C53.1 MALIGNANT NEOPLASM OF EXOCERVIX (H): Primary | ICD-10-CM

## 2017-05-23 LAB
ALBUMIN SERPL-MCNC: 3.1 G/DL (ref 3.4–5)
ALP SERPL-CCNC: 88 U/L (ref 40–150)
ALT SERPL W P-5'-P-CCNC: 30 U/L (ref 0–50)
ANION GAP SERPL CALCULATED.3IONS-SCNC: 10 MMOL/L (ref 3–14)
AST SERPL W P-5'-P-CCNC: 26 U/L (ref 0–45)
BASOPHILS # BLD AUTO: 0 10E9/L (ref 0–0.2)
BASOPHILS # BLD AUTO: NORMAL 10E9/L (ref 0–0.2)
BASOPHILS NFR BLD AUTO: 0.5 %
BASOPHILS NFR BLD AUTO: NORMAL %
BILIRUB SERPL-MCNC: 0.4 MG/DL (ref 0.2–1.3)
BUN SERPL-MCNC: 14 MG/DL (ref 7–30)
CALCIUM SERPL-MCNC: 8.3 MG/DL (ref 8.5–10.1)
CHLORIDE SERPL-SCNC: 101 MMOL/L (ref 94–109)
CO2 SERPL-SCNC: 25 MMOL/L (ref 20–32)
CREAT SERPL-MCNC: 0.73 MG/DL (ref 0.52–1.04)
DIFFERENTIAL METHOD BLD: ABNORMAL
DIFFERENTIAL METHOD BLD: NORMAL
EOSINOPHIL # BLD AUTO: 0.6 10E9/L (ref 0–0.7)
EOSINOPHIL # BLD AUTO: NORMAL 10E9/L (ref 0–0.7)
EOSINOPHIL NFR BLD AUTO: 15.2 %
EOSINOPHIL NFR BLD AUTO: NORMAL %
ERYTHROCYTE [DISTWIDTH] IN BLOOD BY AUTOMATED COUNT: 14.2 % (ref 10–15)
ERYTHROCYTE [DISTWIDTH] IN BLOOD BY AUTOMATED COUNT: NORMAL % (ref 10–15)
GFR SERPL CREATININE-BSD FRML MDRD: 78 ML/MIN/1.7M2
GLUCOSE SERPL-MCNC: 114 MG/DL (ref 70–99)
HCT VFR BLD AUTO: 28.4 % (ref 35–47)
HCT VFR BLD AUTO: NORMAL % (ref 35–47)
HGB BLD-MCNC: 9.4 G/DL (ref 11.7–15.7)
HGB BLD-MCNC: NORMAL G/DL (ref 11.7–15.7)
IMM GRANULOCYTES # BLD: 0 10E9/L (ref 0–0.4)
IMM GRANULOCYTES # BLD: NORMAL 10E9/L (ref 0–0.4)
IMM GRANULOCYTES NFR BLD: 0.8 %
IMM GRANULOCYTES NFR BLD: NORMAL %
LYMPHOCYTES # BLD AUTO: 0.3 10E9/L (ref 0.8–5.3)
LYMPHOCYTES # BLD AUTO: NORMAL 10E9/L (ref 0.8–5.3)
LYMPHOCYTES NFR BLD AUTO: 8.8 %
LYMPHOCYTES NFR BLD AUTO: NORMAL %
MAGNESIUM SERPL-MCNC: 1.8 MG/DL (ref 1.6–2.3)
MCH RBC QN AUTO: 29.8 PG (ref 26.5–33)
MCH RBC QN AUTO: NORMAL PG (ref 26.5–33)
MCHC RBC AUTO-ENTMCNC: 33.1 G/DL (ref 31.5–36.5)
MCHC RBC AUTO-ENTMCNC: NORMAL G/DL (ref 31.5–36.5)
MCV RBC AUTO: 90 FL (ref 78–100)
MCV RBC AUTO: NORMAL FL (ref 78–100)
MONOCYTES # BLD AUTO: 0.4 10E9/L (ref 0–1.3)
MONOCYTES # BLD AUTO: NORMAL 10E9/L (ref 0–1.3)
MONOCYTES NFR BLD AUTO: 11.5 %
MONOCYTES NFR BLD AUTO: NORMAL %
NEUTROPHILS # BLD AUTO: 2.4 10E9/L (ref 1.6–8.3)
NEUTROPHILS # BLD AUTO: NORMAL 10E9/L (ref 1.6–8.3)
NEUTROPHILS NFR BLD AUTO: 63.2 %
NEUTROPHILS NFR BLD AUTO: NORMAL %
NRBC # BLD AUTO: 0 10*3/UL
NRBC # BLD AUTO: NORMAL 10*3/UL
NRBC BLD AUTO-RTO: 0 /100
NRBC BLD AUTO-RTO: NORMAL /100
PLATELET # BLD AUTO: 110 10E9/L (ref 150–450)
PLATELET # BLD AUTO: NORMAL 10E9/L (ref 150–450)
POTASSIUM SERPL-SCNC: 3.8 MMOL/L (ref 3.4–5.3)
PROT SERPL-MCNC: 7.3 G/DL (ref 6.8–8.8)
RBC # BLD AUTO: 3.15 10E12/L (ref 3.8–5.2)
RBC # BLD AUTO: NORMAL 10E12/L (ref 3.8–5.2)
SODIUM SERPL-SCNC: 136 MMOL/L (ref 133–144)
WBC # BLD AUTO: 3.7 10E9/L (ref 4–11)
WBC # BLD AUTO: NORMAL 10E9/L (ref 4–11)

## 2017-05-23 PROCEDURE — 96413 CHEMO IV INFUSION 1 HR: CPT

## 2017-05-23 PROCEDURE — 25000125 ZZHC RX 250: Mod: ZF | Performed by: OBSTETRICS & GYNECOLOGY

## 2017-05-23 PROCEDURE — 77386 ZZH IMRT TREATMENT DELIVERY, COMPLEX: CPT | Performed by: RADIOLOGY

## 2017-05-23 PROCEDURE — 83735 ASSAY OF MAGNESIUM: CPT | Performed by: OBSTETRICS & GYNECOLOGY

## 2017-05-23 PROCEDURE — 96367 TX/PROPH/DG ADDL SEQ IV INF: CPT

## 2017-05-23 PROCEDURE — 96375 TX/PRO/DX INJ NEW DRUG ADDON: CPT

## 2017-05-23 PROCEDURE — 96361 HYDRATE IV INFUSION ADD-ON: CPT

## 2017-05-23 PROCEDURE — 80053 COMPREHEN METABOLIC PANEL: CPT | Performed by: OBSTETRICS & GYNECOLOGY

## 2017-05-23 PROCEDURE — 25800025 ZZH RX 258: Mod: ZF | Performed by: OBSTETRICS & GYNECOLOGY

## 2017-05-23 PROCEDURE — 85025 COMPLETE CBC W/AUTO DIFF WBC: CPT | Performed by: OBSTETRICS & GYNECOLOGY

## 2017-05-23 PROCEDURE — 25000128 H RX IP 250 OP 636: Mod: ZF | Performed by: OBSTETRICS & GYNECOLOGY

## 2017-05-23 RX ORDER — DEXTROSE, SODIUM CHLORIDE, AND POTASSIUM CHLORIDE 5; .45; .15 G/100ML; G/100ML; G/100ML
2000 INJECTION INTRAVENOUS ONCE
Status: COMPLETED | OUTPATIENT
Start: 2017-05-23 | End: 2017-05-23

## 2017-05-23 RX ORDER — PALONOSETRON 0.05 MG/ML
0.25 INJECTION, SOLUTION INTRAVENOUS ONCE
Status: COMPLETED | OUTPATIENT
Start: 2017-05-23 | End: 2017-05-23

## 2017-05-23 RX ADMIN — SODIUM CHLORIDE 150 MG: 9 INJECTION, SOLUTION INTRAVENOUS at 08:14

## 2017-05-23 RX ADMIN — POTASSIUM CHLORIDE, DEXTROSE MONOHYDRATE AND SODIUM CHLORIDE 2000 ML: 150; 5; 450 INJECTION, SOLUTION INTRAVENOUS at 08:40

## 2017-05-23 RX ADMIN — CISPLATIN 70 MG: 1 INJECTION, SOLUTION INTRAVENOUS at 09:36

## 2017-05-23 RX ADMIN — PALONOSETRON HYDROCHLORIDE 0.25 MG: 0.25 INJECTION INTRAVENOUS at 08:42

## 2017-05-23 RX ADMIN — DEXAMETHASONE SODIUM PHOSPHATE 12 MG: 10 INJECTION, SOLUTION INTRAMUSCULAR; INTRAVENOUS at 08:41

## 2017-05-23 ASSESSMENT — PAIN SCALES - GENERAL: PAINLEVEL: NO PAIN (0)

## 2017-05-23 NOTE — NURSING NOTE
Chief Complaint   Patient presents with     Blood Draw     blood labs collected from R arm venipuncture, vitals WDL      Yana Lewis RN

## 2017-05-23 NOTE — PROGRESS NOTES
"Infusion Nursing Note:  Pt presents today for C1 D15 Cisplatin.     present during visit today: Not Applicable.    Lab Results   Component Value Date    HGB 9.4 05/23/2017     Lab Results   Component Value Date    WBC 3.7 05/23/2017      Lab Results   Component Value Date    ANEU 2.4 05/23/2017     Lab Results   Component Value Date     05/23/2017      Lab Results   Component Value Date     05/23/2017                   Lab Results   Component Value Date    POTASSIUM 3.8 05/23/2017           Lab Results   Component Value Date    MAG 1.8 05/23/2017            Lab Results   Component Value Date    CR 0.73 05/23/2017                   Lab Results   Component Value Date    PEPE 8.3 05/23/2017                Lab Results   Component Value Date    BILITOTAL 0.4 05/23/2017           Lab Results   Component Value Date    ALBUMIN 3.1 05/23/2017                    Lab Results   Component Value Date    ALT 30 05/23/2017           Lab Results   Component Value Date    AST 26 05/23/2017     Results reviewed, labs MET treatment parameters.    Note: Pt denies any fever or chills. States she has had some increased nausea compared to week 1. Is taking compazine and ativan with relief. No vomiting noted. Did explain she had to \"push\" herself to eat more. Writer discussed taking compazine on more of a scheduled basis every 6 hours instead of waiting to feel queezy.No other new issues/concerns noted.  Proceed with treatment.    Intravenous Access:  Peripheral IV placed.    Post Infusion Assessment:  Patient tolerated infusion without incident.  Blood return noted pre and post infusion.  PIV flushed and dc'd intact at end of infusion.    Discharge Plan:   Prescription refills given for ativan and compazine.  Discharge instructions reviewed with: Patient.  Patient and/or family verbalized understanding of discharge instructions and all questions answered.  Copy of AVS reviewed with patient and/or family.  Pt will " return 5/30/2017 for next infusion.    Face-To-Face Time: 6 minutes

## 2017-05-23 NOTE — PATIENT INSTRUCTIONS
Contact Numbers  [facility] Main Line/After Hours: [phone]  [facility] Triage line: [phone]      Please call the triage or after hours line if you experience a temperature greater than or equal to 100.5, shaking chills, have uncontrolled nausea, vomiting and/or diarrhea, dizziness, shortness of breath, chest pain, bleeding, unexplained bruising, or if you have any other new/concerning symptoms, questions or concerns.      If you are having any concerning symptoms or wish to speak to a provider before your next infusion visit, please call to notify us so we can adequately serve you.     If you need a refill on a narcotic prescription or other medication, please call before your infusion appointment.                 May 2017   [place] Monday Tuesday Wednesday Thursday Friday Saturday        1     US ABD/PEL DUPLEX COMPLETE    7:55 AM   (50 min.)   [email or web address]   [facility], [facility]     [facility] EXTERNAL RADIATION TREATMT    2:00 PM   (15 min.)   [facility] RAD ONC VARIAN   [facility] 2     UMP EXTERNAL RADIATION TREATMT    2:30 PM   (15 min.)   [facility] RAD ONC VARIAN   [facility] 3     [facility] EXTERNAL RADIATION TREATMT    2:30 PM   (15 min.)   [facility] RAD ONC VARIAN   [facility] 4     [facility] EXTERNAL RADIATION TREATMT    2:30 PM   (15 min.)   [facility] RAD ONC VARIAN   [facility]     [facility] ON TREATMENT VISIT    2:45 PM   (15 min.)   Michelle Carrion MD   [facility] 5     [facility] EXTERNAL RADIATION TREATMT    2:30 PM   (15 min.)   [facility] RAD ONC VARIAN   [facility]     LAB    3:00 PM   (15 min.)    LAB   [facility]     UMP RETURN ACTIVE TREATMENT    3:05 PM   (40 min.)   Kerry Hurtado APRN CNP   [facility] 6       7     8     UMP EXTERNAL RADIATION TREATMT    2:30 PM   (15 min.)   P RAD ONC VARIAN   [facility] 9     [facility]    6[facility]30 AM   (15 min.)   [facility]   [facility] Lab Draw     UMP ONC INFUSION  360    7:00 AM   (360 min.)   [facility] ONCOLOGY INFUSION   [facility]     UMP EXTERNAL RADIATION TREATMT    2:30 PM   (15 min.)   UMP RAD ONC [facility]   Radiation Oncology Clinic 10     UMP EXTERNAL RADIATION TREATMT    2:30 PM   (15 min.)   UMP RAD ONC [facility]   Radiation Oncology Clinic 11     UMP EXTERNAL RADIATION TREATMT    2:30 PM   (15 min.)   [facility] RAD ONC [facility]   Radiation Oncology Clinic     UMP ON TREATMENT VISIT    2:45 PM   (15 min.)   Michelle Carrion MD   [facility] 12     UMP EXTERNAL RADIATION TREATMT    2:30 PM   (15 min.)   UMP RAD ONC [facility]   Radiation Oncology Clinic 13       14     15     UMP EXTERNAL RADIATION TREATMT    2:30 PM   (15 min.)   UMP RAD ONC [facility]   Radiation Oncology Clinic 16     [facility] DRAW    6:30 AM   (15 min.)   [facility] D[facility]   [email or web address] Lab Draw     UMP ONC INFUSION 360    7:00 AM   (360 min.)   [facility] ONCOLOGY INFUSION   [facility]     UMP EXTERNAL RADIATION TREATMT    2:30 PM   (15 min.)   UMP RAD ONC [facility]   Radiation Oncology Clinic 17     UMP EXTERNAL RADIATION TREATMT    2:30 PM   (15 min.)   [facility] RAD ONC [facility]   Radiation Oncology Clinic     UMP ON TREATMENT VISIT    2:45 PM   (15 min.)   Michelle Carrion MD   [facility] 18     UMP EXTERNAL RADIATION TREATMT    2:30 PM   (15 min.)   UMP RAD ONC [facility]   Radiation Oncology Clinic 19     UMP EXTERNAL RADIATION TREATMT    2:30 PM   (15 min.)   [facility] RAD ONC [facility]   Radiation Oncology Clinic 20       21     22     UMP EXTERNAL RADIATION TREATMT    2:30 PM   (15 min.)   UMP RAD ONC [facility]   Rad[facility] Oncology Clinic 23     [facility] D[facility]    6:30 AM   (15 min.)   [facility]   [facility] Draw     [facility] ONC INFUSION 360    7:00 AM   (360 min.)   [facility] ONCOLOGY INFUSION   [facility]     UMP EXTERNAL RADIATION TREATMT    2:30 PM   (15 min.)   UMP RAD ONC [facility]   Radiation Oncology Clinic 24     UMP EXTERNAL  RADIATION TREATMT    2:30 PM   (15 min.)   [facility] RAD ONC [facility]   Radiation Oncology Clinic 25     UMP EXTERNAL RADIATION TREATMT    2:30 PM   (15 min.)   [facility] RAD ONC [facility]   Radiation Oncology Clinic     [facility] ON TREATMENT VISIT    2:45 PM   (15 min.)   Michelle Carrion MD   [facility]cology [facility] 26     UMP EXTERNAL RADIATION TREATMT    2:30 PM   (15 min.)   [facility] RAD ONC [facility]   [facility] Oncology Clinic 27       28     29     30     [facility] DRAW    6:30 AM   (15 min.)    [facility] DRAW   [facility] Draw     P ONC INFUSION 360    7:00 AM   (360 min.)    ONCOLOGY INFUSION   [facility]     UMP EXTERNAL RADIATION TREATMT    2:30 PM   (15 min.)   [facility] RAD ONC [facility]   Radiation Oncology Clinic     MR PELVIS WWO    3:00 PM   (60 min.)   [email or web address]   [facility] [place], MRI 31     UMP EXTERNAL RADIATION TREATMT    2:30 PM   (15 min.)   [facility] RAD ONC [facility]   Radiation Oncology Clinic                           June 2017 Sunday Monday Tuesday Wednesday Thursday Friday Saturday                       1     PAC EVAL   11:15 AM   (60 min.)   6,  Pac [place]   [facility]     PAC RN ASSESSMENT   12:15 PM   (30 min.)   Rn,  Pac   [facility]     PAC ANESTHESIA CONSULT   12:45 PM   (10 min.)   Anesthesiologist,  Pac   [facility]     P EXTERNAL RADIATION TREATMT    2:30 PM   (15 min.)   [facility] RAD ONC [facility]   Radiation Oncology Clinic     [facility] ON TREATMENT VISIT    2:45 PM   (15 min.)   Jazzy Dave MD   [facility]cology Clinic 2     [facility] EXTERNAL RADIATION TREATMT    2:30 PM   (15 min.)   [facility] RAD ONC [facility]   Rad[facility] Oncology Clinic 3       4     5     [facility] DRAW    6:30 AM   (15 min.)    [facility] DRAW   [facility] Draw     P ONC INFUSION 360    7:00 AM   (360 min.)   [facility] ONCOLOGY INFUSION   [facility]     UMP EXTERNAL RADIATION TREATMT    2:30 PM   (15  min.)   [facility] RAD ONC VARIAN   Radiation Oncology Clinic 6     7     8     9     10       11     12     13     [facility] TCT/SIM SUITE    6:00 AM   (90 min.)   Nicole Ward MD   Rad[facility] Oncology Clinic     INSERT INTERSTITIAL NEEDLE, ULTRASOUND GUIDED    8:00 AM   Nicole Ward MD   [facility]     [facility] TCT/SIM SUITE   11:00 AM   (60 min.)   Nicole Ward MD   Radiation Oncology Clinic     [facility] TCT/SIM SUITE    3:00 PM   (60 min.)   Nicole Ward MD   [facility]cology [facility] 14     [facility] TCT/SIM SUITE    8:00 AM   (90 min.)   Michelle Carrion MD   Radiation Oncology [facility] TCT/SIM SUITE    2:30 PM   (60 min.)   Michelle Carrion MD   [facility]cology Clinic 15     [facility] TCT/SIM SUITE    8:00 AM   (60 min.)   Michelle Carrion MD   Radiation Oncology [facility] TCT/SIM SUITE    2:30 PM   (30 min.)   Michelle Carrion MD   Radiation Oncology [facility] TCT/SIM SUITE    3:55 PM   (30 min.)   Michelle Carrion MD   Radiation Oncology Clinic     REMOVE RADIATION NEEDLES CERVIX    4:00 PM   Michelle Carrion MD [facility] OR 16     17       18     19     20     21     22     23     24       25     26     27     28     29     30                       Lab Results:  Recent Results (from the past 12 hour(s))   CBC with platelets differential    Collection Time: 05/23/17  6:42 AM   Result Value Ref Range    WBC  4.0 - 11.0 10e9/L     Canceled, Test credited   Unsatisfactory specimen - clotted  NOTIFIED REGIS LUND RN [facility] 05/23/2017 0714 BY       RBC Count  3.8 - 5.2 10e12/L     Canceled, Test credited   Unsatisfactory specimen - clotted  NOTIFIED REGIS LUND RN [facility] 05/23/2017 0714 BY       Hemoglobin  11.7 - 15.7 g/dL     Canceled, Test credited   Unsatisfactory specimen - clotted  NOTIFIED REGIS LUND RN [facility] 05/23/2017 0714 BY       Hematocrit  35.0 - 47.0 %     Canceled, Test credited   Unsatisfactory specimen - clotted  NOTIFIED REGIS LUND RN [facility] 05/23/2017 0714 BY       MCV   78 - 100 fl     Canceled, Test credited   Unsatisfactory specimen - clotted  NOTIFIED REGIS LUND RN [facility] 05/23/2017 0714 BY       MCH  26.5 - 33.0 pg     Canceled, Test credited   Unsatisfactory specimen - clotted  NOTIFIED REGIS LUND RN [facility] 05/23/2017 0714 BY       MCHC  31.5 - 36.5 g/dL     Canceled, Test credited   Unsatisfactory specimen - clotted  NOTIFIED REGIS LUND RN [facility] 05/23/2017 0714 BY       RDW  10.0 - 15.0 %     Canceled, Test credited   Unsatisfactory specimen - clotted  NOTIFIED REGIS LUND RN [facility] 05/23/2017 0714 BY       Platelet Count  150 - 450 10e9/L     Canceled, Test credited   Unsatisfactory specimen - clotted  NOTIFIED REGIS LUND RN [facility] 05/23/2017 0714 BY       Diff Method       Canceled, Test credited   Unsatisfactory specimen - clotted  NOTIFIED REGIS LUND RN [facility] 05/23/2017 0714 BY       % Neutrophils Not Measured %    % Lymphocytes Not Measured %    % Monocytes Not Measured %    % Eosinophils Not Measured %    % Basophils Not Measured %    % Immature Granulocytes Not Measured %    Nucleated RBCs Not Measured 0 /100    Absolute Neutrophil Not Measured 1.6 - 8.3 10e9/L    Absolute Lymphocytes Not Measured 0.8 - 5.3 10e9/L    Absolute Monocytes Not Measured 0.0 - 1.3 10e9/L    Absolute Eosinophils Not Measured 0.0 - 0.7 10e9/L    Absolute Basophils Not Measured 0.0 - 0.2 10e9/L    Abs Immature Granulocytes Not Measured 0 - 0.4 10e9/L    Absolute Nucleated RBC Not Measured    Comprehensive metabolic panel    Collection Time: 05/23/17  6:42 AM   Result Value Ref Range    Sodium 136 133 - 144 mmol/L    Potassium 3.8 3.4 - 5.3 mmol/L    Chloride 101 94 - 109 mmol/L    Carbon Dioxide 25 20 - 32 mmol/L    Anion Gap 10 3 - 14 mmol/L    Glucose 114 (H) 70 - 99 mg/dL    Urea Nitrogen 14 7 - 30 mg/dL    Creatinine 0.73 0.52 - 1.04 mg/dL    GFR Estimate 78 >60 mL/min/1.7m2    GFR Estimate If Black >90   GFR Calc   >60 mL/min/1.7m2    Calcium  8.3 (L) 8.5 - 10.1 mg/dL    Bilirubin Total 0.4 0.2 - 1.3 mg/dL    Albumin 3.1 (L) 3.4 - 5.0 g/dL    Protein Total 7.3 6.8 - 8.8 g/dL    Alkaline Phosphatase 88 40 - 150 U/L    ALT 30 0 - 50 U/L    AST 26 0 - 45 U/L   Magnesium    Collection Time: 05/23/17  6:42 AM   Result Value Ref Range    Magnesium 1.8 1.6 - 2.3 mg/dL   CBC with platelets differential    Collection Time: 05/23/17  7:24 AM   Result Value Ref Range    WBC 3.7 (L) 4.0 - 11.0 10e9/L    RBC Count 3.15 (L) 3.8 - 5.2 10e12/L    Hemoglobin 9.4 (L) 11.7 - 15.7 g/dL    Hematocrit 28.4 (L) 35.0 - 47.0 %    MCV 90 78 - 100 fl    MCH 29.8 26.5 - 33.0 pg    MCHC 33.1 31.5 - 36.5 g/dL    RDW 14.2 10.0 - 15.0 %    Platelet Count 110 (L) 150 - 450 10e9/L    Diff Method Automated Method     % Neutrophils 63.2 %    % Lymphocytes 8.8 %    % Monocytes 11.5 %    % Eosinophils 15.2 %    % Basophils 0.5 %    % Immature Granulocytes 0.8 %    Nucleated RBCs 0 0 /100    Absolute Neutrophil 2.4 1.6 - 8.3 10e9/L    Absolute Lymphocytes 0.3 (L) 0.8 - 5.3 10e9/L    Absolute Monocytes 0.4 0.0 - 1.3 10e9/L    Absolute Eosinophils 0.6 0.0 - 0.7 10e9/L    Absolute Basophils 0.0 0.0 - 0.2 10e9/L    Abs Immature Granulocytes 0.0 0 - 0.4 10e9/L    Absolute Nucleated RBC 0.0

## 2017-05-24 ENCOUNTER — APPOINTMENT (OUTPATIENT)
Dept: RADIATION ONCOLOGY | Facility: CLINIC | Age: 77
End: 2017-05-24
Attending: RADIOLOGY
Payer: MEDICARE

## 2017-05-24 PROCEDURE — 77386 ZZH IMRT TREATMENT DELIVERY, COMPLEX: CPT | Performed by: RADIOLOGY

## 2017-05-25 ENCOUNTER — APPOINTMENT (OUTPATIENT)
Dept: RADIATION ONCOLOGY | Facility: CLINIC | Age: 77
End: 2017-05-25
Attending: RADIOLOGY
Payer: MEDICARE

## 2017-05-25 VITALS — WEIGHT: 141 LBS | BODY MASS INDEX: 24.19 KG/M2

## 2017-05-25 DIAGNOSIS — C53.1 MALIGNANT NEOPLASM OF EXOCERVIX (H): Primary | ICD-10-CM

## 2017-05-25 PROCEDURE — 77386 ZZH IMRT TREATMENT DELIVERY, COMPLEX: CPT | Performed by: RADIOLOGY

## 2017-05-25 NOTE — PROGRESS NOTES
[facility] PHYSICIANS  SPECIALIZING IN BREAKTHROUGHS  Radiation Oncology    On Treatment Visit Note      Savannah Camacho      Date: 2017   MRN: [id number]   : 1940  Diagnosis: Cervical cancer      Reason for Visit:  On Radiation Treatment Visit     Treatment Summary to Date  Treatment Site: pelvis Current Dose: 3420/4500cGy cGy Fractions: fx + 5 T&R      Chemotherapy  Chemo concurrent with radx?: Yes  Oncologist: Dr. Raines  Drug Name/Frequency 1: Cisplatin (started 17)    Subjective:   Patient reports some mild nausea associated with chemotherapy. She has had worsening fatigue this week. She has noted some looser stools, which are well controlled with Imodium. She has been using vaginal dilator without much difficulty.    Nursing ROS:   Nutrition Alteration  Diet Type: Patient's Preference  Nutrition Note: pushing fluids  Skin  Skin Reaction: 0 - No changes           Gastrointestinal  Nausea: 1 - One to two episodes of nausea/24  Diarrhea: 0 - None  GI Note: took imodium last week-      Psychosocial  Pyschosocial Note: tired  Pain Assessment  0-10 Pain Scale: 0      Objective:   Wt 64 kg (141 lb)  BMI 24.19 kg/m2  Gen: Appears well, in no acute distress      Labs:  CBC RESULTS:   Recent Labs   Lab Test  17   0724   WBC  3.7*   RBC  3.15*   HGB  9.4*   HCT  28.4*   MCV  90   MCH  29.8   MCHC  33.1   RDW  14.2   PLT  110*     ELECTROLYTES:  Recent Labs   Lab Test  17   0642   NA  136   POTASSIUM  3.8   CHLORIDE  101   PEPE  8.3*   CO2  25   BUN  14   CR  0.73   GLC  114*       Assessment:    Tolerating radiation therapy well.  All questions and concerns addressed.    Toxicities:  Fatigue: Grade 1: Fatigue relieved by rest  Nausea: Grade 1: Loss of appetite without alteration in eating habits  Pain: Grade 0: No toxicity  Diarrhea: Grade 1: Increase of <4 stools per day over baseline; mild increase in ostomy output compared to baseline    Plan:   1. Continue current  therapy.    2. Continue Imodium PRN for loose stools or diarrhea.       Mosaiq chart and setup information reviewed  MVCT/IGRT images checked    Medication Review  Med list reviewed with patient?: Yes    Educational Topic Discussed  Additional Instructions: HDR schedule was given to pt and family. All questions explained, Gianna Ray RN  Education Instructions: reviewed        Bobby Ward MD    Please do not send letter to referring physician.        I saw and examined the patient with the resident.  I have reviewed and agree with the resident's note and plan of care.      Michelle Carrion MD

## 2017-05-25 NOTE — LETTER
Date:May 31, 2017      Provider requested that no letter be sent. Do not send.       [facility] Health Information

## 2017-05-25 NOTE — LETTER
2017       RE: Savannah Camacho  [address] [facility] [address]     Dear Colleague,    Thank you for referring your patient, Savannah Camacho, to the RADIATION ONCOLOGY CLINIC. Please see a copy of my visit note below.    [facility] PHYSICIANS  SPECIALIZING IN BREAKTHROUGHS  Radiation Oncology    On Treatment Visit Note      Savannah Camacho      Date: 2017   MRN: [id number]   : 1940  Diagnosis: Cervical cancer      Reason for Visit:  On Radiation Treatment Visit     Treatment Summary to Date  Treatment Site: pelvis Current Dose: 3420/4500cGy cGy Fractions: fx + 5 T&R      Chemotherapy  Chemo concurrent with radx?: Yes  Oncologist: Dr. Raines  Drug Name/Frequency 1: Cisplatin (started 17)    Subjective:   Patient reports some mild nausea associated with chemotherapy. She has had worsening fatigue this week. She has noted some looser stools, which are well controlled with Imodium. She has been using vaginal dilator without much difficulty.    Nursing ROS:   Nutrition Alteration  Diet Type: Patient's Preference  Nutrition Note: pushing fluids  Skin  Skin Reaction: 0 - No changes           Gastrointestinal  Nausea: 1 - One to two episodes of nausea/24  Diarrhea: 0 - None  GI Note: took imodium last week-      Psychosocial  Pyschosocial Note: tired  Pain Assessment  0-10 Pain Scale: 0      Objective:   Wt 64 kg (141 lb)  BMI 24.19 kg/m2  Gen: Appears well, in no acute distress      Labs:  CBC RESULTS:   Recent Labs   Lab Test  17   0724   WBC  3.7*   RBC  3.15*   HGB  9.4*   HCT  28.4*   MCV  90   MCH  29.8   MCHC  33.1   RDW  14.2   PLT  110*     ELECTROLYTES:  Recent Labs   Lab Test  17   0642   NA  136   POTASSIUM  3.8   CHLORIDE  101   PEPE  8.3*   CO2  25   BUN  14   CR  0.73   GLC  114*       Assessment:    Tolerating radiation therapy well.  All questions and concerns addressed.    Toxicities:  Fatigue: Grade 1: Fatigue relieved by rest  Nausea: Grade  1: Loss of appetite without alteration in eating habits  Pain: Grade 0: No toxicity  Diarrhea: Grade 1: Increase of <4 stools per day over baseline; mild increase in ostomy output compared to baseline    Plan:   1. Continue current therapy.    2. Continue Imodium PRN for loose stools or diarrhea.       Mosaiq chart and setup information reviewed  MVCT/IGRT images checked    Medication Review  Med list reviewed with patient?: Yes    Educational Topic Discussed  Additional Instructions: HDR schedule was given to pt and family. All questions explained, Gianna Ray RN  Education Instructions: reviewed        Bobby Ward MD    Please do not send letter to referring physician.        I saw and examined the patient with the resident.  I have reviewed and agree with the resident's note and plan of care.      Michelle Carrion MD    Again, thank you for allowing me to participate in the care of your patient.      Sincerely,    Michelle Carrion MD

## 2017-05-25 NOTE — MR AVS SNAPSHOT
After Visit Summary   5/25/2017    Savannah Camacho    MRN: [id number]           Patient Information     Date Of Birth          1940        Visit Information        Provider Department      5/25/2017 2:45 PM Michelle Carrion MD Radiation Oncology Clinic        Today's Diagnoses     Malignant neoplasm of exocervix (H)    -  1       Follow-ups after your visit        Your next 10 appointments already scheduled     May 26, 2017  2:30 PM CDT   EXTERNAL RADIATION TREATMENT with [facility] RAD ONC VARIAN   Radiation Oncology Clinic ([facility])    [facility]  1st Floor  [address] [address]   [phone]            May 30, 2017  6:30 AM CDT   [facility] Lab Draw with  [facility] LAB DRAW   [facility] ([facility])    [address]  [address] [facility] [address]   [phone]            May 30, 2017  7:00 AM CDT   Infusion 360 with UC ONCOLOGY INFUSION, UC 12 ATC   [facility] ([facility])    [address]  [address] [facility] [address]   [phone]            May 30, 2017  2:30 PM CDT   EXTERNAL RADIATION TREATMENT with [facility] RAD ONC VARIAN   Radiation Oncology Clinic ([facility])    [facility]  1st Floor  [address] [address]   [phone]            May 30, 2017  3:00 PM CDT   MR PELVIS W/O & W CONTRAST with UUMR2   [facility] [place] ([facility] [place])    [address] [address]   [phone]           Take your medicines as usual, unless your doctor tells you not to. Bring a list of your current medicines to your exam (including vitamins, minerals and over-the-counter drugs).  You will be given intravenous contrast for this exam. To prepare:   The day before your exam, drink extra fluids at least six 8-ounce glasses  (unless your doctor tells you to restrict your fluids).   Have a blood test (creatinine test) within 30 days of your exam. Go to your clinic or Diagnostic Imaging Department for this test.  The MRI machine uses a strong magnet. Please wear clothes without metal (snaps, zippers). A sweatsuit works well, or we may give you a hospital gown.  Please remove any body piercings and hair extensions before you arrive. You will also remove watches, jewelry, hairpins, wallets, dentures, partial dental plates and hearing aids. You may wear contact lenses, and you may be able to wear your rings. We have a safe place to keep your personal items, but it is safer to leave them at home.   **IMPORTANT** THE INSTRUCTIONS BELOW ARE ONLY FOR THOSE PATIENTS WHO HAVE BEEN TOLD THEY WILL RECEIVE SEDATION OR GENERAL ANESTHESIA DURING THEIR MRI PROCEDURE:  IF YOU WILL RECEIVE SEDATION (take medicine to help you relax during your exam):   You must get the medicine from your doctor before you arrive. Bring the medicine to the exam. Do not take it at home.   Arrive one hour early. Bring someone who can take you home after the test. Your medicine will make you sleepy. After the exam, you may not drive, take a bus or take a taxi by yourself.   No eating 8 hours before your exam. You may have clear liquids up until 4 hours before your exam. (Clear liquids include water, clear tea, black coffee and fruit juice without pulp.)  IF YOU WILL RECEIVE ANESTHESIA (be asleep for your exam):   Arrive 1 1/2 hours early. Bring someone who can take you home after the test. You may not drive, take a bus or take a taxi by yourself.   No eating 8 hours before your exam. You may have clear liquids up until 4 hours before your exam. (Clear liquids include water, clear tea, black coffee and fruit juice without pulp.)  Please call the Imaging Department at your exam site with any questions.            May 31, 2017  2:30 PM CDT   EXTERNAL RADIATION TREATMENT with UMP  RAD ONC VARIAN   Radiation Oncology Clinic ([facility])    [facility]  1st Floor  [address] [address]   [phone]            Jun 01, 2017 11:30 AM CDT   (Arrive by 11:15 AM)   PAC EVALUATION with [facility] Pac [place] 6   [facility] ([place] [facility])    [address]  4th Floor  [facility] [address]   [phone]            Jun 01, 2017 12:30 PM CDT   (Arrive by 12:15 PM)   PAC RN ASSESSMENT with Lara Pac Rn   [facility] ([place] [facility])    [address]  4th Floor  [facility] [address]   [phone]            Jun 01, 2017  2:30 PM CDT   EXTERNAL RADIATION TREATMENT with [facility] RAD ONC VARIAN   Radiation Oncology Clinic ([facility])    [facility]  1st Floor  [address] [address]   [phone]              Who to contact     Please call your clinic at [phone] to:    Ask questions about your health    Make or cancel appointments    Discuss your medicines    Learn about your test results    Speak to your doctor   If you have compliments or concerns about an experience at your clinic, or if you wish to file a complaint, please contact [facility] Patient Relations at [phone] or email us at [email or web address]         Additional Information About Your Visit        [employer] Information     [email or web address] is an electronic gateway that provides easy, online access to your medical records. With [employer], you can request a clinic appointment, read your test results, renew a prescription or communicate with your care team.     To sign up for [email or web address] visit the website at [email or web address]   You will be asked to enter the access code listed below, as well as some personal information. Please follow the directions to create your username and password.      Your access code is: [email or web address]  Expires: 2017 11:20 AM     Your access code will  in 90 days. If you need help or a new code, please contact your [facility] or call [phone] for assistance.        Care EveryWhere ID     This is your Care EveryWhere ID. This could be used by other organizations to access your [place] medical records  [id number]        Your Vitals Were     BMI (Body Mass Index)                   24.19 kg/m2            Blood Pressure from Last 3 Encounters:   17 115/70   17 103/64   17 98/54    Weight from Last 3 Encounters:   17 64 kg (141 lb)   17 65.2 kg (143 lb 11.2 oz)   17 68.3 kg (150 lb 8 oz)              Today, you had the following     No orders found for display       Primary Care Provider Office Phone # Fax #    Adán Lanier [phone]       [place] [address] [address] [facility] [address]        Thank you!     Thank you for choosing [facility]  for your care. Our goal is always to provide you with excellent care. Hearing back from our patients is one way we can continue to improve our services. Please take a few minutes to complete the written survey that you may receive in the mail after your visit with us. Thank you!             Your Updated Medication List - Protect others around you: Learn how to safely use, store and throw away your medicines at www.disposemymeds.org.          This list is accurate as of: 17  6:11 PM.  Always use your most recent med list.                   Brand Name Dispense Instructions for use    albuterol 108 (90 BASE) MCG/ACT Inhaler    PROAIR HFA/PROVENTIL HFA/VENTOLIN HFA     Inhale 2 puffs into the lungs every 6 hours as needed Reported on 2017       ASPIRIN PO      Take 81 mg by mouth daily       COREG PO      Take 6.25 mg by mouth 2 times daily       hydrochlorothiazide 25 MG tablet    HYDRODIURIL     daily On  hold for now 5/17/17, Reported on 5/16/2017       lisinopril 5 MG tablet    PRINIVIL/ZESTRIL     2 times daily Reported on 5/5/2017       LORazepam 1 MG tablet    ATIVAN    30 tablet    Take 1 tablet (1 mg) by mouth every 6 hours as needed (nausea/vomiting, anxiety or sleep )       MULTIVITAMIN ADULT PO      Take by mouth daily       prochlorperazine 10 MG tablet    COMPAZINE    30 tablet    Take 1 tablet (10 mg) by mouth every 6 hours as needed (nausea/vomiting)       SIMVASTATIN PO      Take 40 mg by mouth once On hold 5/17/17 per Gyn onc  Reported on 5/16/2017       SINGULAIR PO      Take 10 mg by mouth daily Reported on 5/16/2017

## 2017-05-26 ENCOUNTER — APPOINTMENT (OUTPATIENT)
Dept: RADIATION ONCOLOGY | Facility: CLINIC | Age: 77
End: 2017-05-26
Attending: RADIOLOGY
Payer: MEDICARE

## 2017-05-26 PROCEDURE — 77336 RADIATION PHYSICS CONSULT: CPT | Performed by: RADIOLOGY

## 2017-05-26 PROCEDURE — 77386 ZZH IMRT TREATMENT DELIVERY, COMPLEX: CPT | Performed by: RADIOLOGY

## 2017-05-30 ENCOUNTER — HOSPITAL ENCOUNTER (OUTPATIENT)
Dept: MRI IMAGING | Facility: CLINIC | Age: 77
Discharge: HOME | End: 2017-05-30
Attending: RADIOLOGY | Admitting: RADIOLOGY
Payer: MEDICARE

## 2017-05-30 ENCOUNTER — INFUSION THERAPY VISIT (OUTPATIENT)
Dept: ONCOLOGY | Facility: CLINIC | Age: 77
End: 2017-05-30
Attending: OBSTETRICS & GYNECOLOGY
Payer: MEDICARE

## 2017-05-30 ENCOUNTER — APPOINTMENT (OUTPATIENT)
Dept: RADIATION ONCOLOGY | Facility: CLINIC | Age: 77
End: 2017-05-30
Attending: RADIOLOGY
Payer: MEDICARE

## 2017-05-30 VITALS
DIASTOLIC BLOOD PRESSURE: 82 MMHG | TEMPERATURE: 97.7 F | HEART RATE: 85 BPM | OXYGEN SATURATION: 98 % | WEIGHT: 142.7 LBS | SYSTOLIC BLOOD PRESSURE: 116 MMHG | RESPIRATION RATE: 16 BRPM | BODY MASS INDEX: 24.48 KG/M2

## 2017-05-30 DIAGNOSIS — C53.1 MALIGNANT NEOPLASM OF EXOCERVIX (H): Primary | ICD-10-CM

## 2017-05-30 DIAGNOSIS — C53.1 MALIGNANT NEOPLASM OF EXOCERVIX (H): ICD-10-CM

## 2017-05-30 LAB
ALBUMIN SERPL-MCNC: 2.9 G/DL (ref 3.4–5)
ALP SERPL-CCNC: 78 U/L (ref 40–150)
ALT SERPL W P-5'-P-CCNC: 33 U/L (ref 0–50)
ANION GAP SERPL CALCULATED.3IONS-SCNC: 9 MMOL/L (ref 3–14)
AST SERPL W P-5'-P-CCNC: 28 U/L (ref 0–45)
BASOPHILS # BLD AUTO: 0 10E9/L (ref 0–0.2)
BASOPHILS NFR BLD AUTO: 0 %
BILIRUB SERPL-MCNC: 0.3 MG/DL (ref 0.2–1.3)
BUN SERPL-MCNC: 8 MG/DL (ref 7–30)
CALCIUM SERPL-MCNC: 7.9 MG/DL (ref 8.5–10.1)
CHLORIDE SERPL-SCNC: 101 MMOL/L (ref 94–109)
CO2 SERPL-SCNC: 27 MMOL/L (ref 20–32)
CREAT SERPL-MCNC: 0.72 MG/DL (ref 0.52–1.04)
DIFFERENTIAL METHOD BLD: ABNORMAL
EOSINOPHIL # BLD AUTO: 0.3 10E9/L (ref 0–0.7)
EOSINOPHIL NFR BLD AUTO: 12.2 %
ERYTHROCYTE [DISTWIDTH] IN BLOOD BY AUTOMATED COUNT: 14.7 % (ref 10–15)
GFR SERPL CREATININE-BSD FRML MDRD: 79 ML/MIN/1.7M2
GLUCOSE SERPL-MCNC: 110 MG/DL (ref 70–99)
HCT VFR BLD AUTO: 27.9 % (ref 35–47)
HGB BLD-MCNC: 9.4 G/DL (ref 11.7–15.7)
IMM GRANULOCYTES # BLD: 0 10E9/L (ref 0–0.4)
IMM GRANULOCYTES NFR BLD: 0.9 %
LYMPHOCYTES # BLD AUTO: 0.3 10E9/L (ref 0.8–5.3)
LYMPHOCYTES NFR BLD AUTO: 14.3 %
MAGNESIUM SERPL-MCNC: 1.4 MG/DL (ref 1.6–2.3)
MCH RBC QN AUTO: 30.4 PG (ref 26.5–33)
MCHC RBC AUTO-ENTMCNC: 33.7 G/DL (ref 31.5–36.5)
MCV RBC AUTO: 90 FL (ref 78–100)
MONOCYTES # BLD AUTO: 0.2 10E9/L (ref 0–1.3)
MONOCYTES NFR BLD AUTO: 9.6 %
NEUTROPHILS # BLD AUTO: 1.5 10E9/L (ref 1.6–8.3)
NEUTROPHILS NFR BLD AUTO: 63 %
NRBC # BLD AUTO: 0 10*3/UL
NRBC BLD AUTO-RTO: 0 /100
PLATELET # BLD AUTO: 134 10E9/L (ref 150–450)
POTASSIUM SERPL-SCNC: 3.4 MMOL/L (ref 3.4–5.3)
PROT SERPL-MCNC: 6.8 G/DL (ref 6.8–8.8)
RBC # BLD AUTO: 3.09 10E12/L (ref 3.8–5.2)
SODIUM SERPL-SCNC: 138 MMOL/L (ref 133–144)
WBC # BLD AUTO: 2.3 10E9/L (ref 4–11)

## 2017-05-30 PROCEDURE — 83735 ASSAY OF MAGNESIUM: CPT | Performed by: OBSTETRICS & GYNECOLOGY

## 2017-05-30 PROCEDURE — 25000128 H RX IP 250 OP 636: Performed by: RADIOLOGY

## 2017-05-30 PROCEDURE — 96375 TX/PRO/DX INJ NEW DRUG ADDON: CPT

## 2017-05-30 PROCEDURE — 96366 THER/PROPH/DIAG IV INF ADDON: CPT

## 2017-05-30 PROCEDURE — 25000128 H RX IP 250 OP 636: Mod: ZF | Performed by: OBSTETRICS & GYNECOLOGY

## 2017-05-30 PROCEDURE — 77386 ZZH IMRT TREATMENT DELIVERY, COMPLEX: CPT | Performed by: RADIOLOGY

## 2017-05-30 PROCEDURE — A9585 GADOBUTROL INJECTION: HCPCS | Performed by: RADIOLOGY

## 2017-05-30 PROCEDURE — 96413 CHEMO IV INFUSION 1 HR: CPT

## 2017-05-30 PROCEDURE — 96367 TX/PROPH/DG ADDL SEQ IV INF: CPT

## 2017-05-30 PROCEDURE — S5010 5% DEXTROSE AND 0.45% SALINE: HCPCS | Mod: ZF | Performed by: OBSTETRICS & GYNECOLOGY

## 2017-05-30 PROCEDURE — 72197 MRI PELVIS W/O & W/DYE: CPT

## 2017-05-30 PROCEDURE — 25800025 ZZH RX 258: Mod: ZF | Performed by: OBSTETRICS & GYNECOLOGY

## 2017-05-30 PROCEDURE — 25000125 ZZHC RX 250: Mod: ZF | Performed by: OBSTETRICS & GYNECOLOGY

## 2017-05-30 PROCEDURE — 80053 COMPREHEN METABOLIC PANEL: CPT | Performed by: OBSTETRICS & GYNECOLOGY

## 2017-05-30 PROCEDURE — 36415 COLL VENOUS BLD VENIPUNCTURE: CPT

## 2017-05-30 PROCEDURE — 85025 COMPLETE CBC W/AUTO DIFF WBC: CPT | Performed by: OBSTETRICS & GYNECOLOGY

## 2017-05-30 PROCEDURE — 40000141 ZZH STATISTIC PERIPHERAL IV START W/O US GUIDANCE: Mod: ZF

## 2017-05-30 RX ORDER — LOPERAMIDE HCL 2 MG
2 CAPSULE ORAL 4 TIMES DAILY PRN
COMMUNITY

## 2017-05-30 RX ORDER — GADOBUTROL 604.72 MG/ML
7.5 INJECTION INTRAVENOUS ONCE
Status: COMPLETED | OUTPATIENT
Start: 2017-05-30 | End: 2017-05-30

## 2017-05-30 RX ORDER — DEXTROSE, SODIUM CHLORIDE, AND POTASSIUM CHLORIDE 5; .45; .15 G/100ML; G/100ML; G/100ML
2000 INJECTION INTRAVENOUS ONCE
Status: COMPLETED | OUTPATIENT
Start: 2017-05-30 | End: 2017-05-30

## 2017-05-30 RX ORDER — PALONOSETRON 0.05 MG/ML
0.25 INJECTION, SOLUTION INTRAVENOUS ONCE
Status: COMPLETED | OUTPATIENT
Start: 2017-05-30 | End: 2017-05-30

## 2017-05-30 RX ADMIN — GADOBUTROL 6.5 ML: 604.72 INJECTION INTRAVENOUS at 15:06

## 2017-05-30 RX ADMIN — SODIUM CHLORIDE 150 MG: 9 INJECTION, SOLUTION INTRAVENOUS at 07:51

## 2017-05-30 RX ADMIN — POTASSIUM CHLORIDE, DEXTROSE MONOHYDRATE AND SODIUM CHLORIDE 1000 ML: 150; 5; 450 INJECTION, SOLUTION INTRAVENOUS at 08:17

## 2017-05-30 RX ADMIN — PALONOSETRON HYDROCHLORIDE 0.25 MG: 0.25 INJECTION INTRAVENOUS at 07:38

## 2017-05-30 RX ADMIN — MAGNESIUM SULFATE HEPTAHYDRATE: 500 INJECTION, SOLUTION INTRAMUSCULAR; INTRAVENOUS at 10:17

## 2017-05-30 RX ADMIN — CISPLATIN 70 MG: 1 INJECTION, SOLUTION INTRAVENOUS at 10:19

## 2017-05-30 RX ADMIN — DEXAMETHASONE SODIUM PHOSPHATE 12 MG: 10 INJECTION, SOLUTION INTRAMUSCULAR; INTRAVENOUS at 07:34

## 2017-05-30 NOTE — PROGRESS NOTES
Infusion Nursing Note:  Savannah Camacho presents today for cycle 1, day 22 cisplatin.    Patient seen by provider today: No   present during visit today: Not Applicable.    Note: Patient reports some mild nausea which is relieved with compazine. No vomiting. She is eating and drinking adequately.  She reports intermittent diarrhea which is relieved when she takes imodium. She has been taking 1-2 imodium about every other day which is keeping the diarrhea under control.    Intravenous Access:  Peripheral IV placed. Site with swelling and cool to touch above PIV prior to starting chemo. Patient denied pain. PIV pulled and warm packs applied  New PIV placed by vascular access prior to starting chemo    Treatment Conditions:  Lab Results   Component Value Date    HGB 9.4 05/30/2017     Lab Results   Component Value Date    WBC 2.3 05/30/2017      Lab Results   Component Value Date    ANEU 1.5 05/30/2017     Lab Results   Component Value Date     05/30/2017      Lab Results   Component Value Date     05/30/2017                   Lab Results   Component Value Date    POTASSIUM 3.4 05/30/2017           Lab Results   Component Value Date    MAG 1.4 05/30/2017            Lab Results   Component Value Date    CR 0.72 05/30/2017                   Lab Results   Component Value Date    PEPE 7.9 05/30/2017                Lab Results   Component Value Date    BILITOTAL 0.3 05/30/2017           Lab Results   Component Value Date    ALBUMIN 2.9 05/30/2017                    Lab Results   Component Value Date    ALT 33 05/30/2017           Lab Results   Component Value Date    AST 28 05/30/2017     Results reviewed, labs MET treatment parameters, ok to proceed with treatment.  Magnesium replaced IV per protocol        Post Infusion Assessment:  Patient tolerated infusion without incident. Patient voiding in good amounts pre, during, and post cisplatin  Blood return noted pre and post infusion.  Site patent and  intact, free from redness, edema or discomfort.  No evidence of extravasations.  Access discontinued per protocol.    Discharge Plan:   Patient declined prescription refills.  Discharge instructions reviewed with: Patient.  Patient and/or family verbalized understanding of discharge instructions and all questions answered.  Copy of AVS reviewed with patient and/or family.  Patient will return 6/5 for next appointment.  Patient discharged in stable condition accompanied by: self and .  Departure Mode: Ambulatory.  Face to Face time: 0.    Gianna Barrera RN

## 2017-05-30 NOTE — MR AVS SNAPSHOT
After Visit Summary   5/30/2017    Savannah Camacho    MRN: [id number]           Patient Information     Date Of Birth          1940        Visit Information        Provider Department      5/30/2017 7:00 AM  12 ATC;  ONCOLOGY INFUSION [facility]        Today's Diagnoses     Malignant neoplasm of exocervix (H)    -  1      Care Instructions    Contact Numbers    [facility] [place] Main Line: [phone]  [facility] [place] Triage:  [phone]    Call triage with chills and/or temperature greater than or equal to 100.5, uncontrolled nausea/vomiting, diarrhea, constipation, dizziness, shortness of breath, chest pain, bleeding, unexplained bruising, or any new/concerning symptoms, questions/concerns.     If you are having any concerning symptoms or wish to speak to a provider before your next infusion visit, please call your care coordinator or triage to notify them so we can adequately serve you.       After Hours: [phone]    If after hours, weekends, or holidays, call main hospital  and ask for Oncology doctor on call.           May 2017   [place] Monday Tuesday Wednesday Thursday Friday Saturday        1     US ABD/PEL DUPLEX COMPLETE    7:55 AM   (50 min.)   UUUS2   [facility] [place], [facility] EXTERNAL RADIATION TREATMT    2:00 PM   (15 min.)   [facility] RAD ONC VARIAN   Radiation Oncology Clinic 2     P EXTERNAL RADIATION TREATMT    2:30 PM   (15 min.)   P RAD ONC VARIAN   Radiation Oncology Clinic 3     P EXTERNAL RADIATION TREATMT    2:30 PM   (15 min.)   P RAD ONC VARIAN   Radiation Oncology Clinic 4     P EXTERNAL RADIATION TREATMT    2:30 PM   (15 min.)   [facility] RAD ONC VARIAN   Radiation Oncology Clinic     [facility] ON TREATMENT VISIT    2:45 PM   (15 min.)   Michelle Carrion MD   Radiation Oncology Clinic 5     [facility] EXTERNAL RADIATION TREATMT    2:30 PM   (15 min.)   [facility] RAD ONC VARIAN   Radiation Oncology Clinic     LAB    3:00 PM   (15 min.)    LAB    Health Lab     [facility]  RETURN ACTIVE TREATMENT    3:05 PM   (40 min.)   Kerry Hurtado APRN CNP   [facility] 6       7     8     [facility] EXTERNAL RADIATION TREATMT    2:30 PM   (15 min.)   [facility] RAD ONC [facility]   Radiation Oncology [facility] 9     [facility] DRAW    6:30 AM   (15 min.)   [facility] DRAW   [facility] Lab Draw     [facility] ONC INFUSION 360    7:00 AM   (360 min.)   [facility] ONCOLOGY INFUSION   [facility]     [facility] EXTERNAL RADIATION TREATMT    2:30 PM   (15 min.)   [facility] RAD ONC [facility]   Radiation Oncology Clinic 10     [facility] EXTERNAL RADIATION TREATMT    2:30 PM   (15 min.)   [facility] RAD ONC [facility]   [facility] Oncology Clinic 11     [facility] EXTERNAL RADIATION TREATMT    2:30 PM   (15 min.)   [facility] RAD ONC [facility]   Radiation Oncology [facility]     [facility] ON TREATMENT VISIT    2:45 PM   (15 min.)   Michelle Carrion MD   [facility] 12     [facility] EXTERNAL RADIATION TREATMT    2:30 PM   (15 min.)   [facility] RAD ONC [facility]   Radiation Oncology Clinic 13       14     15     [facility] EXTERNAL RADIATION TREATMT    2:30 PM   (15 min.)   [facility] RAD ONC [facility]   Radiation Oncology Clinic 16     [facility] LAB DRAW    6:30 AM   (15 min.)   [facility] DRAW   [facility] Lab Draw     [facility] ONC INFUSION 360    7:00 AM   (360 min.)   [facility] ONCOLOGY INFUSION   [facility]     [facility] EXTERNAL RADIATION TREATMT    2:30 PM   (15 min.)   [facility] RAD ONC [facility]   Radiation Oncology Clinic 17     [facility] EXTERNAL RADIATION TREATMT    2:30 PM   (15 min.)   [facility] RAD ONC [facility]   Radiation Oncology Clinic     [facility] ON TREATMENT VISIT    2:45 PM   (15 min.)   Michelle Carrion MD   [facility]cology [facility] 18     [facility] EXTERNAL RADIATION TREATMT    2:30 PM   (15 min.)   [facility] RAD ONC [facility]   Radiation Oncology Clinic 19     [facility] EXTERNAL RADIATION TREATMT    2:30 PM   (15 min.)   [facility] RAD ONC [facility]   Radiation Oncology Clinic 20       21     22     [facility] EXTERNAL RADIATION TREATMT    2:30 PM   (15 min.)   [facility] RAD ONC [facility]    Radiation Oncology Clinic 23     [facility] LAB DRAW    6:30 AM   (15 min.)    [facility] LAB DRAW   [facility] Lab Draw     [facility] ONC INFUSION 360    7:00 AM   (360 min.)    ONCOLOGY INFUSION   [facility]     [facility] EXTERNAL RADIATION TREATMT    2:30 PM   (15 min.)   [facility] RAD ONC [facility]   Radiation Oncology Clinic 24     [facility] EXTERNAL RADIATION TREATMT    2:30 PM   (15 min.)   [facility] RAD ONC [facility]   Radiation Oncology Clinic 25     [facility] EXTERNAL RADIATION TREATMT    2:30 PM   (15 min.)   [facility] RAD ONC [facility]   Radiation Oncology [facility]     [facility] ON TREATMENT VISIT    2:45 PM   (15 min.)   Michelle Carrion MD   [facility] Oncology Clinic 26     [facility] EXTERNAL RADIATION TREATMT    2:30 PM   (15 min.)   [facility] RAD ONC [facility]   Radiation Oncology Clinic 27       28     29     30     [facility] LAB DRAW    6:30 AM   (15 min.)    [facility] LAB DRAW   [facility] Lab Draw     P ONC INFUSION 360    7:00 AM   (360 min.)    ONCOLOGY INFUSION   [facility]     [facility] EXTERNAL RADIATION TREATMT    2:30 PM   (15 min.)   [facility] RAD ONC [facility]   Radiation Oncology [facility]     MR PELVIS WWO    3:00 PM   (60 min.)   UUMR2   [facility] [place], MRI 31     [facility] EXTERNAL RADIATION TREATMT    2:30 PM   (15 min.)   [facility] RAD ONC [facility]   Radiation Oncology Clinic                           June 2017 Sunday Monday Tuesday Wednesday Thursday Friday Saturday                       1     PAC EVAL   11:15 AM   (60 min.)   6,  Pac [facility]     PAC RN ASSESSMENT   12:15 PM   (30 min.)   Rn, [facility]     PAC ANESTHESIA CONSULT   12:45 PM   (10 min.)   Anesthesiologist, [facility]     [facility] EXTERNAL RADIATION TREATMT    2:30 PM   (15 min.)   [facility] RAD ONC [facility]   Radiation Oncology Clinic     [facility] ON TREATMENT VISIT    2:45 PM   (15 min.)   Jazzy Dave MD   Radiation Oncology Clinic 2     [facility] EXTERNAL  RADIATION TREATMT    2:30 PM   (15 min.)   [facility] RAD ONC [facility]   Radiation Oncology Clinic 3       4     5     [facility] LAB DRAW    6:30 AM   (15 min.)    [facility] LAB DRAW   [facility] Lab Draw     [facility] ONC INFUSION 360    7:00 AM   (360 min.)    ONCOLOGY INFUSION   [facility] EXTERNAL RADIATION TREATMT    2:30 PM   (15 min.)   [facility] RAD ONC [facility]   Radiation Oncology Clinic 6     7     8     9     10       11     12     13     [facility] TCT/SIM SUITE    6:00 AM   (90 min.)   Nicole Ward MD   Radiation Oncology Clinic     INSERT INTERSTITIAL NEEDLE, ULTRASOUND GUIDED    8:00 AM   Nicole Ward MD   [facility] OR     [facility] TCT/SIM SUITE   11:00 AM   (60 min.)   Nicole Ward MD   Radiation Oncology Clinic     [facility] TCT/SIM SUITE    3:00 PM   (60 min.)   Nicole Ward MD   Radiation Oncology Clinic 14     P TCT/SIM SUITE    8:00 AM   (90 min.)   Michelle Carrion MD   Radiation Oncology Clinic     [facility] TCT/SIM SUITE    2:30 PM   (60 min.)   Michelle Carrion MD   Radiation Oncology Clinic 15     P TCT/SIM SUITE    8:00 AM   (60 min.)   Michelle Carrion MD   Radiation Oncology Clinic     [facility] TCT/SIM SUITE    2:30 PM   (30 min.)   Michelle Carrion MD   [facility]    3:55 PM   (30 min.)   Michelle Carrion MD   Radiation Oncology Clinic     REMOVE RADIATION NEEDLES CERVIX    4:00 PM   Michelle Carrion MD   [facility] OR 16     17       18     19     20     21     22     23     24       25     26     27     28     29     30                      Recent Results (from the past 24 hour(s))   CBC with platelets differential    Collection Time: 05/30/17  7:07 AM   Result Value Ref Range    WBC 2.3 (L) 4.0 - 11.0 10e9/L    RBC Count 3.09 (L) 3.8 - 5.2 10e12/L    Hemoglobin 9.4 (L) 11.7 - 15.7 g/dL    Hematocrit 27.9 (L) 35.0 - 47.0 %    MCV 90 78 - 100 fl    MCH 30.4 26.5 - 33.0 pg    MCHC 33.7 31.5 - 36.5 g/dL    RDW 14.7 10.0 - 15.0 %    Platelet  Count 134 (L) 150 - 450 10e9/L    Diff Method Automated Method     % Neutrophils 63.0 %    % Lymphocytes 14.3 %    % Monocytes 9.6 %    % Eosinophils 12.2 %    % Basophils 0.0 %    % Immature Granulocytes 0.9 %    Nucleated RBCs 0 0 /100    Absolute Neutrophil 1.5 (L) 1.6 - 8.3 10e9/L    Absolute Lymphocytes 0.3 (L) 0.8 - 5.3 10e9/L    Absolute Monocytes 0.2 0.0 - 1.3 10e9/L    Absolute Eosinophils 0.3 0.0 - 0.7 10e9/L    Absolute Basophils 0.0 0.0 - 0.2 10e9/L    Abs Immature Granulocytes 0.0 0 - 0.4 10e9/L    Absolute Nucleated RBC 0.0    Comprehensive metabolic panel    Collection Time: 05/30/17  7:07 AM   Result Value Ref Range    Sodium 138 133 - 144 mmol/L    Potassium 3.4 3.4 - 5.3 mmol/L    Chloride 101 94 - 109 mmol/L    Carbon Dioxide 27 20 - 32 mmol/L    Anion Gap 9 3 - 14 mmol/L    Glucose 110 (H) 70 - 99 mg/dL    Urea Nitrogen 8 7 - 30 mg/dL    Creatinine 0.72 0.52 - 1.04 mg/dL    GFR Estimate 79 >60 mL/min/1.7m2    GFR Estimate If Black >90   GFR Calc   >60 mL/min/1.7m2    Calcium 7.9 (L) 8.5 - 10.1 mg/dL    Bilirubin Total 0.3 0.2 - 1.3 mg/dL    Albumin 2.9 (L) 3.4 - 5.0 g/dL    Protein Total 6.8 6.8 - 8.8 g/dL    Alkaline Phosphatase 78 40 - 150 U/L    ALT 33 0 - 50 U/L    AST 28 0 - 45 U/L   Magnesium    Collection Time: 05/30/17  7:07 AM   Result Value Ref Range    Magnesium 1.4 (L) 1.6 - 2.3 mg/dL                 Follow-ups after your visit        Your next 10 appointments already scheduled     May 30, 2017  2:30 PM CDT   EXTERNAL RADIATION TREATMENT with [facility] RAD ONC VARIAN   Radiation Oncology Clinic ([facility])    [facility]  1st Floor  [address] [id number] [phone]            May 30, 2017  3:00 PM CDT   MR PELVIS W/O & W CONTRAST with UUMR2   [facility] [place], MRI ([facility])    [address] [address] [phone]           Take your  medicines as usual, unless your doctor tells you not to. Bring a list of your current medicines to your exam (including vitamins, minerals and over-the-counter drugs).  You will be given intravenous contrast for this exam. To prepare:   The day before your exam, drink extra fluids at least six 8-ounce glasses (unless your doctor tells you to restrict your fluids).   Have a blood test (creatinine test) within 30 days of your exam. Go to your clinic or Diagnostic Imaging Department for this test.  The MRI machine uses a strong magnet. Please wear clothes without metal (snaps, zippers). A sweatsuit works well, or we may give you a hospital gown.  Please remove any body piercings and hair extensions before you arrive. You will also remove watches, jewelry, hairpins, wallets, dentures, partial dental plates and hearing aids. You may wear contact lenses, and you may be able to wear your rings. We have a safe place to keep your personal items, but it is safer to leave them at home.   **IMPORTANT** THE INSTRUCTIONS BELOW ARE ONLY FOR THOSE PATIENTS WHO HAVE BEEN TOLD THEY WILL RECEIVE SEDATION OR GENERAL ANESTHESIA DURING THEIR MRI PROCEDURE:  IF YOU WILL RECEIVE SEDATION (take medicine to help you relax during your exam):   You must get the medicine from your doctor before you arrive. Bring the medicine to the exam. Do not take it at home.   Arrive one hour early. Bring someone who can take you home after the test. Your medicine will make you sleepy. After the exam, you may not drive, take a bus or take a taxi by yourself.   No eating 8 hours before your exam. You may have clear liquids up until 4 hours before your exam. (Clear liquids include water, clear tea, black coffee and fruit juice without pulp.)  IF YOU WILL RECEIVE ANESTHESIA (be asleep for your exam):   Arrive 1 1/2 hours early. Bring someone who can take you home after the test. You may not drive, take a bus or take a taxi by yourself.   No eating 8 hours  before your exam. You may have clear liquids up until 4 hours before your exam. (Clear liquids include water, clear tea, black coffee and fruit juice without pulp.)  Please call the Imaging Department at your exam site with any questions.            May 31, 2017  2:30 PM CDT   EXTERNAL RADIATION TREATMENT with [facility] RAD ONC VARIAN   Radiation Oncology Clinic ([facility])    [facility]  1st Floor  [address] [address]   [phone]            Jun 01, 2017 11:30 AM CDT   (Arrive by 11:15 AM)   PAC EVALUATION with  Pac [place] 6   [facility] ([facility])    [address]  [address] [facility] [address]   [phone]            Jun 01, 2017 12:30 PM CDT   (Arrive by 12:15 PM)   PAC RN ASSESSMENT with  Pac Rn   [facility] ([facility])    [address]  [address] [facility] [address]   [phone]            Jun 01, 2017  1:00 PM CDT   (Arrive by 12:45 PM)   PAC Anesthesia Consult with  Pac Anesthesiologist   [facility] ([facility])    [address]  [address] [facility] [address]   [phone]            Jun 01, 2017  2:30 PM CDT   EXTERNAL RADIATION TREATMENT with P RAD ONC VARIAN   Radiation Oncology Clinic ([facility])    [facility]  1st Floor  [address] [address]   [phone]            Jun 01, 2017  2:45 PM CDT   ON TREATMENT VISIT with Jazzy Dave MD   Radiation Oncology Clinic ([facility])    [facility]  1st Floor  [address] [address]   [phone]            Jun 02, 2017  2:30 PM CDT   EXTERNAL RADIATION TREATMENT with [facility] RAD ONC VARIAN   Radiation Oncology Clinic ([facility])    [facility]  1st  "Floor  [address] [address]   [phone]            2017  2:30 PM CDT   EXTERNAL RADIATION TREATMENT with [facility] RAD ONC VARIAN   Radiation Oncology Clinic ([facility])    [facility]  1st Floor  [address] [address]   [phone]              Who to contact     If you have questions or need follow up information about today's clinic visit or your schedule please contact [facility] directly at [phone].  Normal or non-critical lab and imaging results will be communicated to you by [employer], letter or phone within 4 business days after the clinic has received the results. If you do not hear from us within 7 days, please contact the clinic through "[employer]t or phone. If you have a critical or abnormal lab result, we will notify you by phone as soon as possible.  Submit refill requests through [employer] or call your pharmacy and they will forward the refill request to us. Please allow 3 business days for your refill to be completed.          Additional Information About Your Visit        [employer] Information     [employer] lets you send messages to your doctor, view your test results, renew your prescriptions, schedule appointments and more. To sign up, go to [email or web address] . Click on \"Log in\" on the left side of the screen, which will take you to the Welcome page. Then click on \"Sign up Now\" on the right side of the page.     You will be asked to enter the access code listed below, as well as some personal information. Please follow the directions to create your username and password.     Your access code is: [email or web address]  Expires: 2017 11:20 AM     Your access code will  in 90 days. If you need help or a new code, please call your [place] clinic or [phone].        Care EveryWhere ID     This is your Care EveryWhere ID. This could be used by other organizations to access your [place] " medical records  [id number]         Blood Pressure from Last 3 Encounters:   05/30/17 116/82   05/23/17 115/70   05/16/17 103/64    Weight from Last 3 Encounters:   05/30/17 64.7 kg (142 lb 11.2 oz)   05/25/17 64 kg (141 lb)   05/23/17 65.2 kg (143 lb 11.2 oz)              We Performed the Following     CBC with platelets differential     Comprehensive metabolic panel     Magnesium        Primary Care Provider Office Phone # Fax #    Adán Lanier [phone]       [place] [id number] [facility] [address]        Thank you!     Thank you for choosing [facility]  for your care. Our goal is always to provide you with excellent care. Hearing back from our patients is one way we can continue to improve our services. Please take a few minutes to complete the written survey that you may receive in the mail after your visit with us. Thank you!             Your Updated Medication List - Protect others around you: Learn how to safely use, store and throw away your medicines at www.disposemymeds.org.          This list is accurate as of: 5/30/17 12:28 PM.  Always use your most recent med list.                   Brand Name Dispense Instructions for use    albuterol 108 (90 BASE) MCG/ACT Inhaler    PROAIR HFA/PROVENTIL HFA/VENTOLIN HFA     Inhale 2 puffs into the lungs every 6 hours as needed Reported on 4/20/2017       ASPIRIN PO      Take 81 mg by mouth daily       COREG PO      Take 6.25 mg by mouth 2 times daily       hydrochlorothiazide 25 MG tablet    HYDRODIURIL     daily On hold for now 5/17/17, Reported on 5/16/2017       lisinopril 5 MG tablet    PRINIVIL/ZESTRIL     2 times daily Reported on 5/5/2017       loperamide 2 MG capsule    IMODIUM     Take 2 mg by mouth 4 times daily as needed for diarrhea       LORazepam 1 MG tablet    ATIVAN    30 tablet    Take 1 tablet (1 mg) by mouth every 6 hours as needed (nausea/vomiting, anxiety or sleep )        MULTIVITAMIN ADULT PO      Take by mouth daily       prochlorperazine 10 MG tablet    COMPAZINE    30 tablet    Take 1 tablet (10 mg) by mouth every 6 hours as needed (nausea/vomiting)       SIMVASTATIN PO      Take 40 mg by mouth once On hold 5/17/17 per Gyn onc  Reported on 5/16/2017       SINGULAIR PO      Take 10 mg by mouth daily Reported on 5/16/2017

## 2017-05-30 NOTE — NURSING NOTE
Chief Complaint   Patient presents with     Blood Draw     Labs collected peripherally by RN.      Labs collected via venipuncture. Checked in for infusion.     Moira Jiménez RN

## 2017-05-31 ENCOUNTER — APPOINTMENT (OUTPATIENT)
Dept: RADIATION ONCOLOGY | Facility: CLINIC | Age: 77
End: 2017-05-31
Attending: RADIOLOGY
Payer: MEDICARE

## 2017-05-31 PROCEDURE — 77386 ZZH IMRT TREATMENT DELIVERY, COMPLEX: CPT | Performed by: RADIOLOGY

## 2017-06-01 ENCOUNTER — OFFICE VISIT (OUTPATIENT)
Dept: SURGERY | Facility: CLINIC | Age: 77
End: 2017-06-01

## 2017-06-01 ENCOUNTER — APPOINTMENT (OUTPATIENT)
Dept: RADIATION ONCOLOGY | Facility: CLINIC | Age: 77
End: 2017-06-01
Attending: RADIOLOGY
Payer: MEDICARE

## 2017-06-01 ENCOUNTER — ALLIED HEALTH/NURSE VISIT (OUTPATIENT)
Dept: SURGERY | Facility: CLINIC | Age: 77
End: 2017-06-01

## 2017-06-01 ENCOUNTER — ANESTHESIA EVENT (OUTPATIENT)
Dept: SURGERY | Facility: CLINIC | Age: 77
DRG: 741 | End: 2017-06-01
Payer: MEDICARE

## 2017-06-01 VITALS
TEMPERATURE: 97.7 F | HEART RATE: 73 BPM | RESPIRATION RATE: 16 BRPM | OXYGEN SATURATION: 98 % | BODY MASS INDEX: 24.61 KG/M2 | SYSTOLIC BLOOD PRESSURE: 120 MMHG | DIASTOLIC BLOOD PRESSURE: 66 MMHG | HEIGHT: 65 IN | WEIGHT: 147.7 LBS

## 2017-06-01 DIAGNOSIS — Z01.818 PRE-OPERATIVE GENERAL PHYSICAL EXAMINATION: ICD-10-CM

## 2017-06-01 DIAGNOSIS — C53.9 MALIGNANT NEOPLASM OF CERVIX, UNSPECIFIED SITE (H): ICD-10-CM

## 2017-06-01 DIAGNOSIS — Z01.818 PRE-OPERATIVE GENERAL PHYSICAL EXAMINATION: Primary | ICD-10-CM

## 2017-06-01 DIAGNOSIS — C53.9 MALIGNANT NEOPLASM OF CERVIX, UNSPECIFIED SITE (H): Primary | ICD-10-CM

## 2017-06-01 LAB
APTT PPP: 23 SEC (ref 22–37)
INR PPP: 0.97 (ref 0.86–1.14)

## 2017-06-01 PROCEDURE — 77386 ZZH IMRT TREATMENT DELIVERY, COMPLEX: CPT | Performed by: RADIOLOGY

## 2017-06-01 NOTE — MR AVS SNAPSHOT
After Visit Summary   6/1/2017    Savannah Camacho    MRN: [id number]           Patient Information     Date Of Birth          1940        Visit Information        Provider Department      6/1/2017 2:45 PM Jazzy Dave MD Radiation Oncology Clinic        Today's Diagnoses     Malignant neoplasm of cervix, unspecified site (H)    -  1       Follow-ups after your visit        Your next 10 appointments already scheduled     Jun 02, 2017  2:30 PM CDT   EXTERNAL RADIATION TREATMENT with P RAD ONC VARIAN   Radiation Oncology Clinic ([facility])    [facility]  1st Floor  [address] [address]   [phone]            Jun 05, 2017  6:30 AM CDT   M[facility]ic Lab Draw with  MASONIC LAB DRAW   [facility] Lab Draw ([facility])    [address]  [address]nd [facility] [address]   [phone]            Jun 05, 2017  7:00 AM CDT   Infusion 360 with UC ONCOLOGY INFUSION, UC 10 ATC   [facility] Cancer Clinic ([facility])    [address]  [address] [facility] [address]   [phone]            Jun 05, 2017  2:30 PM CDT   EXTERNAL RADIATION TREATMENT with [facility] RAD ONC VARIAN   Radiation Oncology Clinic ([facility])    [facility]  1st Floor  [address] [address]   [phone]            Jun 13, 2017  7:55 AM CDT   (Arrive by 6:00 AM)   TCT/SIM Suite Visit with Nicole Ward MD   Radiation Oncology Clinic ([facility])    [facility]  1st Floor  [address] [address]   [phone]            Jun 13, 2017   Procedure with Nicole Ward MD   [facility], [place], Same Day Surgery (--)    [address] [address]   [phone]            Jun 13, 2017 11:00 AM CDT   TCT/SIM Suite Visit with Nicole Ward MD   Radiation Oncology  Clinic ([facility])    [facility]  1st Floor  [address] [address]   [phone]            2017  3:00 PM CDT   TCT/SIM Suite Visit with Nicole Ward MD   Radiation Oncology Clinic ([facility])    [facility]  1st Floor  [address] [address]   [phone]            2017  8:00 AM CDT   TCT/SIM Suite Visit with Michelle Carrion MD   Radiation Oncology Clinic ([facility])    [facility]  1st Floor  [address] [address] [phone]              Who to contact     Please call your clinic at [phone] to:    Ask questions about your health    Make or cancel appointments    Discuss your medicines    Learn about your test results    Speak to your doctor   If you have compliments or concerns about an experience at your clinic, or if you wish to file a complaint, please contact [facility] Patient Relations at [phone] or email us at [email or web address]         Additional Information About Your Visit        AirbnbharGezlong Information     Pinkdingot is an electronic gateway that provides easy, online access to your medical records. With [employer], you can request a clinic appointment, read your test results, renew a prescription or communicate with your care team.     To sign up for Pinkdingot visit the website at [email or web address]   You will be asked to enter the access code listed below, as well as some personal information. Please follow the directions to create your username and password.     Your access code is: [email or web address]  Expires: 2017 11:20 AM     Your access code will  in 90 days. If you need help or a new code, please contact your [facility] or call [phone] for assistance.        Care EveryWhere ID     This is your Care EveryWhere  ID. This could be used by other organizations to access your [place] medical records  [id number]         Blood Pressure from Last 3 Encounters:   06/01/17 120/66   05/30/17 116/82   05/23/17 115/70    Weight from Last 3 Encounters:   06/01/17 67 kg (147 lb 11.2 oz)   05/30/17 64.7 kg (142 lb 11.2 oz)   05/25/17 64 kg (141 lb)              Today, you had the following     No orders found for display       Primary Care Provider Office Phone # Fax #    Adán Lanier [phone]       [address] [place] [facility] [address]        Thank you!     Thank you for choosing RAD[facility] ONCOLOGY CLINIC  for your care. Our goal is always to provide you with excellent care. Hearing back from our patients is one way we can continue to improve our services. Please take a few minutes to complete the written survey that you may receive in the mail after your visit with us. Thank you!             Your Updated Medication List - Protect others around you: Learn how to safely use, store and throw away your medicines at www.disposemymeds.org.          This list is accurate as of: 6/1/17 11:59 PM.  Always use your most recent med list.                   Brand Name Dispense Instructions for use    albuterol 108 (90 BASE) MCG/ACT Inhaler    PROAIR HFA/PROVENTIL HFA/VENTOLIN HFA     Inhale 2 puffs into the lungs every 6 hours as needed Reported on 4/20/2017       ASPIRIN PO      Take 81 mg by mouth daily (with dinner)       COREG PO      Take 6.25 mg by mouth 2 times daily       hydrochlorothiazide 25 MG tablet    HYDRODIURIL     daily On hold for now 5/17/17, Reported on 5/16/2017       lisinopril 5 MG tablet    PRINIVIL/ZESTRIL     Take 5 mg by mouth 2 times daily Reported on 5/5/2017       loperamide 2 MG capsule    IMODIUM     Take 2 mg by mouth 4 times daily as needed for diarrhea       LORazepam 1 MG tablet    ATIVAN    30 tablet    Take 1 tablet (1 mg) by mouth every 6 hours as needed  (nausea/vomiting, anxiety or sleep )       MULTIVITAMIN ADULT PO      Take by mouth every morning       prochlorperazine 10 MG tablet    COMPAZINE    30 tablet    Take 1 tablet (10 mg) by mouth every 6 hours as needed (nausea/vomiting)       SIMVASTATIN PO      Take 40 mg by mouth once On hold 5/17/17 per Gyn onc  Reported on 5/16/2017       SINGULAIR PO      Take 10 mg by mouth every evening Reported on 5/16/2017

## 2017-06-01 NOTE — MR AVS SNAPSHOT
After Visit Summary   2017    Savannah Camacho    MRN: [id number]           Patient Information     Date Of Birth          1940        Visit Information        Provider Department      2017 12:30 PM Rn, [facility] Preoperative [facility]        Care Instructions    Preparing for Your Surgery      Name:  Savannah Camacho   MRN:  [id number]   :  1940   Today's Date:  2017     Arriving for surgery:  Surgery date:  17  Surgery time:  8:00AM  Arrival time:  5:30AM  Please come to:       [facility] [address]  [address] [place]  [address]    -   parking is available in front of the hospital from 5:15 am to 8:00 pm    -  Stop at the Information Desk in the lobby    -   Inform the information person that you are here for surgery. An escort to 3c will be provided. If you would not like an escort, please proceed to 3C on the 3rd floor. [phone]     What can I eat or drink?  -  You may have solid food or milk products until 8 hours prior to your surgery.  -  You may have water, apple juice or 7up/Sprite until 2 hours prior to your surgery.    Which medicines can I take?  -  Do NOT take these medications in the morning, the day of surgery:  Please do not take lisinopril, and multivitamins the morning of your surgery.  Stop aspirin as of 17.    -  Please take these medications the day of surgery:  Please take your carvedilol the morning of surgery.  You may take your albuterol as needed the morning of surgery but also bring your inhaler to the hospital.  You may take your imodium, ativan and or compazine if needed the morning of surgery.    How do I prepare myself?  -  Take two showers: one the night before surgery; and one the morning of surgery.         Use Scrubcare or Hibiclens to wash from neck down.  You may use your own shampoo and conditioner. No other hair products.   -  Do NOT use lotion, powder, deodorant,  or antiperspirant the day of your surgery.  -  Do NOT wear any makeup, fingernail polish or jewelry.  -Do not bring your own medications to the hospital, except for inhalers and eye drops.  -  Bring your ID and insurance card.    Questions or Concerns:  If you have questions or concerns, please call the  Preoperative Assessment Center, Monday-Friday 7AM-7PM:  [phone]          AFTER YOUR SURGERY  Breathing exercises   Breathing exercises help you recover faster. Take deep breaths and let the air out slowly. This will:     Help you wake up after surgery.    Help prevent complications like pneumonia.  Preventing complications will help you go home sooner.   Nausea and vomiting   You may feel sick to your stomach after surgery; if so, let your nurse know.    Pain control:  After surgery, you may have pain. Our goal is to help you manage your pain. Pain medicine will help you feel comfortable enough to do activities that will help you heal.  These activities may include breathing exercises, walking and physical therapy.   To help your health care team treat your pain we will ask: 1) If you have pain  2) where it is located 3) describe your pain in your words  Methods of pain control include medications given by mouth, vein or by nerve block for some surgeries.  Sequential Compression Device (SCD) or Pneumo Boots:  You may need to wear SCD S on your legs or feet. These are wraps connected to a machine that pumps in air and releases it. The repeated pumping helps prevent blood clots from forming.     Hospital Visiting Restrictions:   Due to the current measles outbreak, visitor restrictions are in place in the hospitals. No visitors under 5 are allowed to visit. Also, visitors who are unvaccinated for measles and those who are currently ill are also asked to refrain from visiting.          Follow-ups after your visit        Your next 10 appointments already scheduled     Jun 01, 2017  1:00 PM CDT   (Arrive by 12:45  PM)   PAC Anesthesia Consult with  Pac Anesthesiologist   [facility] Preoperative Assessment Center ([facility])    [address]  4th Floor  [facility] [address]   [phone]            Jun 01, 2017  1:15 PM CDT   LAB with  LAB   [facility] Lab ([facility])    [address]  1st Floor  [facility] [address]   [phone]           Patient must bring picture ID.  Patient should be prepared to give a urine specimen  Please do not eat 10-12 hours before your appointment if you are coming in fasting for labs on lipids, cholesterol, or glucose (sugar).  Pregnant women should follow their Care Team instructions. Water with medications is okay. Do not drink coffee or other fluids.   If you have concerns about taking  your medications, please ask at office or if scheduling via Page365, send a message by clicking on Secure Messaging, Message Your Care Team.            Jun 01, 2017  2:30 PM CDT   EXTERNAL RADIATION TREATMENT with [facility] RAD ONC VARIAN   Radiation Oncology Clinic ([facility])    [facility]  1st Floor  [address] [address]   [phone]            Jun 01, 2017  2:45 PM CDT   ON TREATMENT VISIT with Jazzy Dave MD   Radiation Oncology Clinic ([facility])    [facility]  1st Floor  [address] [address]   [phone]            Jun 02, 2017  2:30 PM CDT   EXTERNAL RADIATION TREATMENT with [facility] RAD ONC VARIAN   Radiation Oncology Clinic ([facility])    [facility]  1st Floor  [address] [address]   [phone]            Jun 05, 2017  6:30 AM CDT   Masonic Lab Draw with  MASONIC LAB DRAW   [facility] Masonic Lab Draw ([facility])    [address]  2nd Floor  [facility] [address]   [phone]            Jun 05, 2017  7:00 AM  CDT   Infusion 360 with  ONCOLOGY INFUSION, UC 10 ATC   [facility] Cancer [facility] ([facility])    [address]  2nd Floor  [facility] [address] [phone]            2017  2:30 PM CDT   EXTERNAL RADIATION TREATMENT with [facility] RAD ONC VARIAN   Radiation Oncology Clinic ([facility])    [facility]  1st Floor  [address] [facility] [address] [facility] [address] [phone]            2017  7:55 AM CDT   (Arrive by 6:00 AM)   TCT/SIM Suite Visit with Nicole Ward MD   Radiation Oncology Clinic ([facility])    [facility]  1st Floor  [address] [facility] [address] [facility] [id number] [phone]              Who to contact     Please call your clinic at [phone] to:    Ask questions about your health    Make or cancel appointments    Discuss your medicines    Learn about your test results    Speak to your doctor   If you have compliments or concerns about an experience at your clinic, or if you wish to file a complaint, please contact [facility] Patient Relations at [phone] or email us at [email or web address]         Additional Information About Your Visit        DDNhart Information     PowerbyProxit is an electronic gateway that provides easy, online access to your medical records. With [facility], you can request a clinic appointment, read your test results, renew a prescription or communicate with your care team.     To sign up for PowerbyProxit visit the website at [email or web address]   You will be asked to enter the access code listed below, as well as some personal information. Please follow the directions to create your username and password.     Your access code is: [email or web address]  Expires: 2017 11:20 AM     Your access code will  in 90 days. If you need help or a new code, please contact your [facility] or  call [phone] for assistance.        Care EveryWhere ID     This is your Care EveryWhere ID. This could be used by other organizations to access your [place] medical records  [id number]         Blood Pressure from Last 3 Encounters:   06/01/17 120/66   05/30/17 116/82   05/23/17 115/70    Weight from Last 3 Encounters:   06/01/17 67 kg (147 lb 11.2 oz)   05/30/17 64.7 kg (142 lb 11.2 oz)   05/25/17 64 kg (141 lb)              Today, you had the following     No orders found for display       Primary Care Provider Office Phone # Fax #    Adán Lanier [phone]       [address] [place] [facility] [address]        Thank you!     Thank you for choosing [facility] PREOPERATIVE ASSESSMENT [place]  for your care. Our goal is always to provide you with excellent care. Hearing back from our patients is one way we can continue to improve our services. Please take a few minutes to complete the written survey that you may receive in the mail after your visit with us. Thank you!             Your Updated Medication List - Protect others around you: Learn how to safely use, store and throw away your medicines at www.disposemymeds.org.          This list is accurate as of: 6/1/17 12:40 PM.  Always use your most recent med list.                   Brand Name Dispense Instructions for use    albuterol 108 (90 BASE) MCG/ACT Inhaler    PROAIR HFA/PROVENTIL HFA/VENTOLIN HFA     Inhale 2 puffs into the lungs every 6 hours as needed Reported on 4/20/2017       ASPIRIN PO      Take 81 mg by mouth daily (with dinner)       COREG PO      Take 6.25 mg by mouth 2 times daily       hydrochlorothiazide 25 MG tablet    HYDRODIURIL     daily On hold for now 5/17/17, Reported on 5/16/2017       lisinopril 5 MG tablet    PRINIVIL/ZESTRIL     Take 5 mg by mouth 2 times daily Reported on 5/5/2017       loperamide 2 MG capsule    IMODIUM     Take 2 mg by mouth 4 times daily as needed for diarrhea        LORazepam 1 MG tablet    ATIVAN    30 tablet    Take 1 tablet (1 mg) by mouth every 6 hours as needed (nausea/vomiting, anxiety or sleep )       MULTIVITAMIN ADULT PO      Take by mouth every morning       prochlorperazine 10 MG tablet    COMPAZINE    30 tablet    Take 1 tablet (10 mg) by mouth every 6 hours as needed (nausea/vomiting)       SIMVASTATIN PO      Take 40 mg by mouth once On hold 5/17/17 per Gyn onc  Reported on 5/16/2017       SINGULAIR PO      Take 10 mg by mouth every evening Reported on 5/16/2017

## 2017-06-01 NOTE — H&P
Pre-Operative H & P     CC:  Preoperative exam to assess for increased cardiopulmonary risk while undergoing surgery and anesthesia.    Date of Encounter: 6/1/2017  Primary Care Physician:  Adán Lanier  Savannah Camacho is a 77 year old female who presents for pre-operative H & P in preparation for interstitial needles in cervix, with Dr Nicole Redd, on 6/13/17 and subsequent removal with Dr. Michelle Carrion, att [facility], in treatment of cervical cancer.  She first noted a vaginal discharge in Jan 2017 which was treated as infection. Patient was uncomfortable with that and sought care in [place] and had EUA with biopsies revealing SCCa.  She is undergoing cycle 1 of  Cisplatin chemotherapy with some complications but is feeling better now. She was taken off diuretic (BP med) due to dehydration and off her statin due to elevated liver tests. She has anemia. Nausea is treated with compazine and she has not required any pain pills.   She remains active with walking.    History is obtained from the patient.     Past Medical History  Past Medical History:   Diagnosis Date     Asthma-intermittent rare inhaler use      Cervical cancer (H)      Takutsubo Congestive heart failure (CHF) (H)      Hyperlipidemia      Hypertension      Lung nodule     Long standing Rt apical lung nodule     MI (myocardial infarction) (H): NSTEMI -see above 2009    involving anterior wall        Past Surgical History  Past Surgical History:   Procedure Laterality Date     BIOPSY OF VAGINA,SIMPLE  02/2017    also bx from 2012,      COLONOSCOPY       HEART CATH COR in setting of Takutsubo  2009       Hx of Blood transfusions/reactions: no     Hx of abnormal bleeding or anti-platelet use: on ASA    Menstrual history: No LMP recorded. Patient is postmenopausal.    Steroid use in the last year: no    Personal or FH with difficulty with Anesthesia:  no    Prior to Admission  Medications  Current Outpatient Prescriptions   Medication Sig Dispense Refill     loperamide (IMODIUM) 2 MG capsule Take 2 mg by mouth 4 times daily as needed for diarrhea       lisinopril (PRINIVIL/ZESTRIL) 5 MG tablet Take 5 mg by mouth 2 times daily Reported on 2017       LORazepam (ATIVAN) 1 MG tablet Take 1 tablet (1 mg) by mouth every 6 hours as needed (nausea/vomiting, anxiety or sleep ) 30 tablet 1     prochlorperazine (COMPAZINE) 10 MG tablet Take 1 tablet (10 mg) by mouth every 6 hours as needed (nausea/vomiting) 30 tablet 2     Montelukast Sodium (SINGULAIR PO) Take 10 mg by mouth every evening Reported on 2017       Carvedilol (COREG PO) Take 6.25 mg by mouth 2 times daily       albuterol (PROAIR HFA/PROVENTIL HFA/VENTOLIN HFA) 108 (90 BASE) MCG/ACT Inhaler Inhale 2 puffs into the lungs every 6 hours as needed Reported on 2017       Multiple Vitamins-Minerals (MULTIVITAMIN ADULT PO) Take by mouth every morning        ASPIRIN PO Take 81 mg by mouth daily (with dinner)        hydrochlorothiazide (HYDRODIURIL) 25 MG tablet daily On hold for now 17, Reported on 2017       SIMVASTATIN PO Take 40 mg by mouth once On hold 17 per Gyn onc  Reported on 2017         Allergies  Allergies   Allergen Reactions     Amoxicillin Rash       Social History  Social History     Social History     Marital status:      Spouse name: Reynold     Number of children: 4 children one      Occupational History     Dental office halfway     Social History Main Topics     Smoking status: Never Smoker     Smokeless tobacco: Never Used     Alcohol use Yes     Drug use: No     Sexual activity: Not on file     Other Topics Concern     Social History Narrative   Lives with her sadi    Family History  Family History   Problem Relation Age of Onset     CANCER Other      Daughter had Breast Cancer at age 39     Pt has had prior anesthetic. Type: General and MAC (persistent chills at home)  "    ROS/MED HX    ENT/Pulmonary:     (+)Intermittent asthma Last exacerbation: never needed urgen care,Treatment: Inhaler prn,  , . Other pulmonary disease Chronic cough / vocal cord nodules/ voice therapy has helped.    Neurologic:  - neg neurologic ROS     Cardiovascular:     (+) hypertension----. Taking blood thinners Pt has not received instructions: . CHF etiology: Takutsubo Last EF: 65% date: 5/2016 . . :. .       METS/Exercise Tolerance:  >4 METS   Hematologic:     (+) Anemia, -      Musculoskeletal:  - neg musculoskeletal ROS       GI/Hepatic:     (+) GERD Asymptomatic on medication,       Renal/Genitourinary:     (+) chronic renal disease, type: ARF, Pt does not require dialysis, Pt has no history of transplant,       Endo:  - neg endo ROS       Psychiatric:  - neg psychiatric ROS       Infectious Disease:  - neg infectious disease ROS       Malignancy:   (+) Malignancy History of Other  Other CA Active status post Chemo and Radiation         Other:    (+) No chance of pregnancy no H/O Chronic Pain,no other significant disability          The complete review of systems is negative other than noted in the HPI or here.   Temp: 97.7  F (36.5  C) Temp src: Oral BP: 120/66 Pulse: 73   Resp: 16 SpO2: 98 %         147 lbs 11.2 oz  5' 5\"   Body mass index is 24.58 kg/(m^2).       Physical Exam  Constitutional: Awake, alert, cooperative, no apparent distress, and appears stated age.  Eyes: Pupils equal, round and reactive to light, extra ocular muscles intact, sclera clear, conjunctiva normal.  HENT: Normocephalic, oral pharynx with moist mucus membranes, good dentition. No goiter appreciated.   Respiratory: Clear to auscultation bilaterally, no crackles or wheezing.  Cardiovascular: Regular rate and rhythm, normal S1 and S2, and no murmur noted.  Carotids +2, no bruits. 1+ LE bilateral edema. Palpable pulses to radial and PT.  GI: Normal bowel sounds, soft, non-distended, non-tender, no masses palpated, no " hepatosplenomegaly.      Lymph/Hematologic: No cervical lymphadenopathy and no supraclavicular lymphadenopathy.  Genitourinary:  deferred  Skin: Warm and dry.    Musculoskeletal: Full ROM of neck. Right posterior spinal prominence with appearance of scoliosis. There is no redness, warmth, or swelling of the joints. Gross motor strength is normal.    Neurologic: Awake, alert, oriented to name, place and time. Cranial nerves II-XII are grossly intact. Gait is normal.   Neuropsychiatric: Calm, cooperative. Normal affect.     Labs: (personally reviewed)  CBC RESULTS:   Recent Labs   Lab Test  17   0707   WBC  2.3*   RBC  3.09*   HGB  9.4*   HCT  27.9*   MCV  90   MCH  30.4   MCHC  33.7   RDW  14.7   PLT  134*     Last Basic Metabolic Panel:  Lab Results   Component Value Date     2017      Lab Results   Component Value Date    POTASSIUM 3.4 2017     Lab Results   Component Value Date    CHLORIDE 101 2017     Lab Results   Component Value Date    PEPE 7.9 2017     Lab Results   Component Value Date    CO2 27 2017     Lab Results   Component Value Date    BUN 8 2017     Lab Results   Component Value Date    CR 0.72 2017     Lab Results   Component Value Date     2017       EK2017: Sinus rhythm with Premature supraventricular complexes; Possible Left atrial enlargement; Borderline ECG    [facility]  Stress ECHO 3/2015 no ischemia EF 70%; ECHO 2016 EF 65%, moderate mitral regurgitation.   PFT's: Mild restrictive ds. Mildly reduced FVC and FEV1      Outside records reviewed from: [facility]    ASSESSMENT and PLAN  Savannah Camacho is a 77 year old female scheduled to undergo interstitial needles in cervix, with Dr Nicole Redd, on 17 and subsequent removal with Dr. Michelle Carrion, in treatment of cervical cancer. She is currently receiving cisplatin chemotherapy and radiation.   Pre-operative  considerations include:  1.) Cardiac: HTN. 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 NL coronaries 2009. Stress ECHO 3/2015 no ischemia EF 70%; ECHO 5/2016 EF 65%, moderate mitral regurgitation.  She has been off her diuretic (dehydration) and statin (elevate LFTs).  Exercise tolerance is >4 METS. RCRI= 0.4%.   -Continue B-blocker to DOS  -HOLD ASA   2.) Pulmonary: Intermittent asthma-on singulair. No acute exacerbations. PFTs 8/2016 mild restriction. Vocal cord nodule with hoarseness. Rt lung thickening at apex-on PET scan.   -VERÓNICA risk 2/8= low risk.   -Take prescribed singulair and albuterol prn DOS  3.) HEME: Cycle 1 chemotherapy with Cisplatin begun 5/1/17.  Complications of anemia, hyponatremia, ISABELLA due to dehydration. 5/30/17 NA+ 138, HGB 9.4, wbc 2.3, Cr 0.72.  VTE risk-current cancer, age elevate risk to 3%  -INR/PTT  4.) GI: PONV risk = 2 (2 or > antiemetic prophylaxis recommended)          Patient was discussed with Dr Jeff.    RACQUEL Lakhani  Preoperative Assessment Center  [facility]  Clinic and [facility] Center  Phone: [phone]  Fax: [phone]

## 2017-06-01 NOTE — ANESTHESIA PREPROCEDURE EVALUATION
Anesthesia Evaluation     . Pt has had prior anesthetic. Type: General and MAC (persistent chills at home)           ROS/MED HX    ENT/Pulmonary:     (+)Intermittent asthma Last exacerbation: never needed urgen care,Treatment: Inhaler prn,  , . Other pulmonary disease Chronic cough / vocal cord nodules/ voice therapy has helped.    Neurologic:  - neg neurologic ROS     Cardiovascular:     (+) hypertension----. Taking blood thinners Pt has not received instructions: . CHF etiology: Takutsubo Last EF: 65% date: 5/2016 . . :. .       METS/Exercise Tolerance:  >4 METS   Hematologic:     (+) Anemia, -      Musculoskeletal:  - neg musculoskeletal ROS       GI/Hepatic:     (+) GERD Asymptomatic on medication,       Renal/Genitourinary:     (+) chronic renal disease, type: ARF, Pt does not require dialysis, Pt has no history of transplant,       Endo:  - neg endo ROS       Psychiatric:  - neg psychiatric ROS       Infectious Disease:  - neg infectious disease ROS       Malignancy:   (+) Malignancy History of Other  Other CA Active status post Chemo and Radiation         Other:    (+) No chance of pregnancy no H/O Chronic Pain,no other significant disability                    Physical Exam  Normal systems: dental    Airway   Mallampati: II  TM distance: <3 FB  Neck ROM: full    Dental     Cardiovascular   Rhythm and rate: regular and normal      Pulmonary    breath sounds clear to auscultation    Other findings: LABS:  CBC RESULTS: Lab Test        05/30/17                       0707          WBC          2.3*          RBC          3.09*         HGB          9.4*          HCT          27.9*         MCV          90            MCH          30.4          MCHC         33.7          RDW          14.7          PLT          134*          Last Basic Metabolic Panel:  Lab Results      Component                Value               Date                      NA                       138                 05/30/2017             Lab  Results      Component                Value               Date                      POTASSIUM                3.4                 05/30/2017            Lab Results      Component                Value               Date                      CHLORIDE                 101                 05/30/2017            Lab Results      Component                Value               Date                      PEPE                      7.9                 05/30/2017            Lab Results      Component                Value               Date                      CO2                      27                  05/30/2017            Lab Results      Component                Value               Date                      BUN                      8                   05/30/2017            Lab Results      Component                Value               Date                      CR                       0.72                05/30/2017            Lab Results      Component                Value               Date                      GLC                      110                 05/30/2017                     PAC Discussion and Assessment    ASA Classification: 3  Case is suitable for:   Anesthetic techniques and relevant risks discussed: GA with regional block for post-op pain control  Invasive monitoring and risk discussed:   Types:   Possibility and Risk of blood transfusion discussed:   NPO instructions given:   Additional anesthetic preparation and risks discussed:   Needs early admission to pre-op area:   Other:     PAC Resident/NP Anesthesia Assessment:  77 year old female for insertion of interstitial needles in cervix, with Dr Nicole Redd, on 6/11/17 and removal with Dr. Michelle Carrion on 6/13/17, in treatment of cervical cancer. Pac referral for risk assessment and optimization for anesthesia with comorbid conditions of HTN, CHF, asthma.  Pre-operative considerations include:  1.) Cardiac: HTN. 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 NL coronaries 2009. Stress ECHO 3/2015 no ischemia EF 70%; ECHO 5/2016 EF 65%, moderate mitral regurgitation.  Exercise tolerance is >4 METS. RCRI= 0.4%  She has been off her diuretic (dehydration) and statin (elevate LFTs).   -Continue B-blocker to DOS  -HOLD ASA  2.) Pulmonary: Intermittent asthma-on singulair. No acute exacerbations. PFTs 8/2016 mild restriction. Vocal cord nodule with hoarseness. Rt lung thickening at apex-on PET scan. VERÓNICA risk 2/8= low risk.   Take prescribed singulair and albuterol prn DOS  3.) HEME: Cycle 1 chemotherapy with Cisplatin begun 5/1/17.  Complications of anemia, hyponatremia, ISABELLA due to dehydration. 5/30/17 NA+ 138, HGB 9.4, wbc 2.3, Cr 0.72.  VTE risk-current cancer, age elevate risk to 3%  4.) GI: PONV risk = 2 (2 or > antiemetic prophylaxis recommended)  Lat GA was foot 2009-no problems. Pt also evaluated by Dr. Jeff. See his recommendations below.      Reviewed and Signed by PAC Mid-Level Provider/Resident  Mid-Level Provider/Resident: Brittany Epps PA-C  Date: 6/1/17  Time: 12:44 PM    Attending Anesthesiologist Anesthesia Assessment:  I have examined the patient and reviewed the chart.  I have discussed the patient with the MEG and concur with her assessment.  The patient had Takutsubo Cardiomyopathy in 2009 which has completely resolved on subsequent echocardiograms (EF 65%).  She also has a history of vocal cord polyps which have not been treated.    PE:  Somewhat stridorous voice,  MPC 2, 3 FBTMD, good extension, Lungs clear, CVS  RRR with no murmur.    Discussed epidural for 72 hour pain relief, GA vs regional/MAC.  Final plan per anesthesiologist the day of surgery.  COagulation panel drawn today given recent chemotherapy.    Reviewed and Signed by PAC Anesthesiologist  Anesthesiologist: Merritt Jeff MD  Date: 06/01/2017  Time:   Pass/Fail:   Disposition:     PAC Pharmacist Assessment:        Pharmacist:   Date:    Time:      Anesthesia Plan      History & Physical Review  History and physical reviewed and following examination; no interval change.    ASA Status:  3 .    NPO Status:  > 4 hours    Plan for General and Periph. Nerve Block for postop pain with Intravenous induction. Maintenance will be Balanced.    PONV prophylaxis:  Ondansetron (or other 5HT-3) and Dexamethasone or Solumedrol  History and physical assessed; Patient examined.  I have reviewed and agree with this pre-op assessment and anesthetic plan.  Patient and family also agree.   Risks and alternatives presented and discussed.   AQA.  Hx of prior Takasubo cardiomyopathy (2009), with clean coronaries and subsequent complete LVEF recovery.  -rcp        Postoperative Care  Postoperative pain management:  IV analgesics.      Consents  Anesthetic plan, risks, benefits and alternatives discussed with:  Patient.  Use of blood products discussed: No .   .                          .

## 2017-06-01 NOTE — PATIENT INSTRUCTIONS
Preparing for Your Surgery      Name:  Savannah Camacho   MRN:  [id number]   :  1940   Today's Date:  2017     Arriving for surgery:  Surgery date:  17  Surgery time:  8:00AM  Arrival time:  5:30AM  Please come to:       [facility] [address]  [address] [place]  [address]    -   parking is available in front of the hospital from 5:15 am to 8:00 pm    -  Stop at the Information Desk in the lobby    -   Inform the information person that you are here for surgery. An escort to 3c will be provided. If you would not like an escort, please proceed to 3C on the 3rd floor. [phone]     What can I eat or drink?  -  You may have solid food or milk products until 8 hours prior to your surgery.  -  You may have water, apple juice or 7up/Sprite until 2 hours prior to your surgery.    Which medicines can I take?  -  Do NOT take these medications in the morning, the day of surgery:  Please do not take lisinopril, and multivitamins the morning of your surgery.  Stop aspirin as of 17.    -  Please take these medications the day of surgery:  Please take your carvedilol the morning of surgery.  You may take your albuterol as needed the morning of surgery but also bring your inhaler to the hospital.  You may take your imodium, ativan and or compazine if needed the morning of surgery.    How do I prepare myself?  -  Take two showers: one the night before surgery; and one the morning of surgery.         Use Scrubcare or Hibiclens to wash from neck down.  You may use your own shampoo and conditioner. No other hair products.   -  Do NOT use lotion, powder, deodorant, or antiperspirant the day of your surgery.  -  Do NOT wear any makeup, fingernail polish or jewelry.  -Do not bring your own medications to the hospital, except for inhalers and eye drops.  -  Bring your ID and insurance card.    Questions or Concerns:  If you have questions or concerns, please call  the  Preoperative Assessment Center, Monday-Friday 7AM-7PM:  [phone]          AFTER YOUR SURGERY  Breathing exercises   Breathing exercises help you recover faster. Take deep breaths and let the air out slowly. This will:     Help you wake up after surgery.    Help prevent complications like pneumonia.  Preventing complications will help you go home sooner.   Nausea and vomiting   You may feel sick to your stomach after surgery; if so, let your nurse know.    Pain control:  After surgery, you may have pain. Our goal is to help you manage your pain. Pain medicine will help you feel comfortable enough to do activities that will help you heal.  These activities may include breathing exercises, walking and physical therapy.   To help your health care team treat your pain we will ask: 1) If you have pain  2) where it is located 3) describe your pain in your words  Methods of pain control include medications given by mouth, vein or by nerve block for some surgeries.  Sequential Compression Device (SCD) or Pneumo Boots:  You may need to wear SCD S on your legs or feet. These are wraps connected to a machine that pumps in air and releases it. The repeated pumping helps prevent blood clots from forming.     Hospital Visiting Restrictions:   Due to the current measles outbreak, visitor restrictions are in place in the hospitals. No visitors under 5 are allowed to visit. Also, visitors who are unvaccinated for measles and those who are currently ill are also asked to refrain from visiting.

## 2017-06-01 NOTE — LETTER
2017       RE: Savannah Camacho  [address] [facility] [address]     Dear Colleague,    Thank you for referring your patient, Savannah Camacho, to the RADIATION ONCOLOGY CLINIC. Please see a copy of my visit note below.    [facility]  SPECIALIZING IN BREAKTHROUGHS  Radiation Oncology    On Treatment Visit Note      Savannah Camacho      Date: 2017   MRN: [id number]   : 1940  Diagnosis: Cervical cancer      Reason for Visit:  On Radiation Treatment Visit     Treatment Summary to Date  Treatment Site: pelvis Current Dose: 3960/4500cGy cGy Fractions: 22/25fx + 5 T&R      Chemotherapy  Chemo concurrent with radx?: Yes  Oncologist: Dr. Raines  Drug Name/Frequency 1: Cisplatin (started 17)    Subjective:   Doing about the same.   Taking imodium for diarrhea.   Nursing ROS:   Nutrition Alteration  Diet Type: Patient's Preference  Nutrition Note: pushing fluids  Skin  Skin Reaction: 0 - No changes     Gastrointestinal  Nausea: 1 - One to two episodes of nausea/24  Diarrhea: 1 - Abdominal cramping, two or less soft or liquid bowel movements (under control with imodium)  GI Note: took imodium last week-   Genitourinary   Note: WNL  Psychosocial  Pyschosocial Note: tired  Pain Assessment  0-10 Pain Scale: 0    Objective:   There were no vitals taken for this visit.  Skin:   No changes       Assessment:    Tolerating radiation therapy well.  All questions and concerns addressed.    Plan:   1. Continue current therapy.        Mosaiq chart and setup information reviewed  MVCT/IGRT images checked    Medication Review  Med list reviewed with patient?: Yes    Educational Topic Discussed  Additional Instructions: HDR schedule was given to pt and family. All questions explained, Gianna Ray RN  Education Instructions: reviewed        Jazzy Dave MD  [phone] clinic  Pager [phone]

## 2017-06-02 ENCOUNTER — APPOINTMENT (OUTPATIENT)
Dept: RADIATION ONCOLOGY | Facility: CLINIC | Age: 77
End: 2017-06-02
Attending: RADIOLOGY
Payer: MEDICARE

## 2017-06-02 PROCEDURE — 77386 ZZH IMRT TREATMENT DELIVERY, COMPLEX: CPT | Performed by: RADIOLOGY

## 2017-06-02 NOTE — PROGRESS NOTES
[facility]  SPECIALIZING IN BREAKTHROUGHS  Radiation Oncology    On Treatment Visit Note      Savannah Camacho      Date: 2017   MRN: [id number]   : 1940  Diagnosis: Cervical cancer      Reason for Visit:  On Radiation Treatment Visit     Treatment Summary to Date  Treatment Site: pelvis Current Dose: 3960/4500cGy cGy Fractions: 22/25fx + 5 T&R      Chemotherapy  Chemo concurrent with radx?: Yes  Oncologist: Dr. Raines  Drug Name/Frequency 1: Cisplatin (started 17)    Subjective:   Doing about the same.   Taking imodium for diarrhea.   Nursing ROS:   Nutrition Alteration  Diet Type: Patient's Preference  Nutrition Note: pushing fluids  Skin  Skin Reaction: 0 - No changes     Gastrointestinal  Nausea: 1 - One to two episodes of nausea/24  Diarrhea: 1 - Abdominal cramping, two or less soft or liquid bowel movements (under control with imodium)  GI Note: took imodium last week-   Genitourinary   Note: WNL  Psychosocial  Pyschosocial Note: tired  Pain Assessment  0-10 Pain Scale: 0    Objective:   There were no vitals taken for this visit.  Skin:   No changes       Assessment:    Tolerating radiation therapy well.  All questions and concerns addressed.    Plan:   1. Continue current therapy.        Mosaiq chart and setup information reviewed  MVCT/IGRT images checked    Medication Review  Med list reviewed with patient?: Yes    Educational Topic Discussed  Additional Instructions: HDR schedule was given to pt and family. All questions explained, Gianna Ray RN  Education Instructions: reviewed        Jazzy Dave MD  [phone] clinic  Pager [phone]

## 2017-06-05 ENCOUNTER — INFUSION THERAPY VISIT (OUTPATIENT)
Dept: ONCOLOGY | Facility: CLINIC | Age: 77
End: 2017-06-05
Attending: OBSTETRICS & GYNECOLOGY
Payer: MEDICARE

## 2017-06-05 ENCOUNTER — APPOINTMENT (OUTPATIENT)
Dept: RADIATION ONCOLOGY | Facility: CLINIC | Age: 77
End: 2017-06-05
Attending: RADIOLOGY
Payer: MEDICARE

## 2017-06-05 ENCOUNTER — APPOINTMENT (OUTPATIENT)
Dept: LAB | Facility: CLINIC | Age: 77
End: 2017-06-05
Attending: OBSTETRICS & GYNECOLOGY
Payer: MEDICARE

## 2017-06-05 VITALS
BODY MASS INDEX: 23.65 KG/M2 | SYSTOLIC BLOOD PRESSURE: 104 MMHG | RESPIRATION RATE: 16 BRPM | WEIGHT: 142.1 LBS | DIASTOLIC BLOOD PRESSURE: 62 MMHG | HEART RATE: 84 BPM | OXYGEN SATURATION: 98 % | TEMPERATURE: 97.7 F

## 2017-06-05 DIAGNOSIS — C53.1 MALIGNANT NEOPLASM OF EXOCERVIX (H): Primary | ICD-10-CM

## 2017-06-05 PROBLEM — D70.9 NEUTROPENIA (H): Status: ACTIVE | Noted: 2017-06-05

## 2017-06-05 LAB
ALBUMIN SERPL-MCNC: 3.1 G/DL (ref 3.4–5)
ALP SERPL-CCNC: 91 U/L (ref 40–150)
ALT SERPL W P-5'-P-CCNC: 41 U/L (ref 0–50)
ANION GAP SERPL CALCULATED.3IONS-SCNC: 8 MMOL/L (ref 3–14)
AST SERPL W P-5'-P-CCNC: 39 U/L (ref 0–45)
BASOPHILS # BLD AUTO: 0 10E9/L (ref 0–0.2)
BASOPHILS NFR BLD AUTO: 0.6 %
BILIRUB SERPL-MCNC: 0.3 MG/DL (ref 0.2–1.3)
BUN SERPL-MCNC: 9 MG/DL (ref 7–30)
CALCIUM SERPL-MCNC: 7.9 MG/DL (ref 8.5–10.1)
CHLORIDE SERPL-SCNC: 100 MMOL/L (ref 94–109)
CO2 SERPL-SCNC: 26 MMOL/L (ref 20–32)
CREAT SERPL-MCNC: 0.77 MG/DL (ref 0.52–1.04)
DIFFERENTIAL METHOD BLD: ABNORMAL
EOSINOPHIL # BLD AUTO: 0.2 10E9/L (ref 0–0.7)
EOSINOPHIL NFR BLD AUTO: 12.9 %
ERYTHROCYTE [DISTWIDTH] IN BLOOD BY AUTOMATED COUNT: 15.4 % (ref 10–15)
GFR SERPL CREATININE-BSD FRML MDRD: 73 ML/MIN/1.7M2
GLUCOSE SERPL-MCNC: 105 MG/DL (ref 70–99)
HCT VFR BLD AUTO: 27 % (ref 35–47)
HGB BLD-MCNC: 9.2 G/DL (ref 11.7–15.7)
IMM GRANULOCYTES # BLD: 0 10E9/L (ref 0–0.4)
IMM GRANULOCYTES NFR BLD: 1.2 %
LYMPHOCYTES # BLD AUTO: 0.3 10E9/L (ref 0.8–5.3)
LYMPHOCYTES NFR BLD AUTO: 19 %
MAGNESIUM SERPL-MCNC: 1.2 MG/DL (ref 1.6–2.3)
MCH RBC QN AUTO: 30.7 PG (ref 26.5–33)
MCHC RBC AUTO-ENTMCNC: 34.1 G/DL (ref 31.5–36.5)
MCV RBC AUTO: 90 FL (ref 78–100)
MONOCYTES # BLD AUTO: 0.2 10E9/L (ref 0–1.3)
MONOCYTES NFR BLD AUTO: 9.8 %
NEUTROPHILS # BLD AUTO: 0.9 10E9/L (ref 1.6–8.3)
NEUTROPHILS NFR BLD AUTO: 56.5 %
NRBC # BLD AUTO: 0 10*3/UL
NRBC BLD AUTO-RTO: 0 /100
PLATELET # BLD AUTO: 149 10E9/L (ref 150–450)
POTASSIUM SERPL-SCNC: 3.4 MMOL/L (ref 3.4–5.3)
PROT SERPL-MCNC: 7.1 G/DL (ref 6.8–8.8)
RBC # BLD AUTO: 3 10E12/L (ref 3.8–5.2)
SODIUM SERPL-SCNC: 134 MMOL/L (ref 133–144)
WBC # BLD AUTO: 1.6 10E9/L (ref 4–11)

## 2017-06-05 PROCEDURE — 99212 OFFICE O/P EST SF 10 MIN: CPT | Mod: 25

## 2017-06-05 PROCEDURE — 25000125 ZZHC RX 250: Mod: ZF | Performed by: OBSTETRICS & GYNECOLOGY

## 2017-06-05 PROCEDURE — 25000128 H RX IP 250 OP 636: Mod: ZF | Performed by: OBSTETRICS & GYNECOLOGY

## 2017-06-05 PROCEDURE — 96377 APPLICATON ON-BODY INJECTOR: CPT | Mod: 59

## 2017-06-05 PROCEDURE — 36415 COLL VENOUS BLD VENIPUNCTURE: CPT | Performed by: OBSTETRICS & GYNECOLOGY

## 2017-06-05 PROCEDURE — 80053 COMPREHEN METABOLIC PANEL: CPT | Performed by: OBSTETRICS & GYNECOLOGY

## 2017-06-05 PROCEDURE — 77336 RADIATION PHYSICS CONSULT: CPT | Performed by: RADIOLOGY

## 2017-06-05 PROCEDURE — 83735 ASSAY OF MAGNESIUM: CPT | Performed by: OBSTETRICS & GYNECOLOGY

## 2017-06-05 PROCEDURE — 77386 ZZH IMRT TREATMENT DELIVERY, COMPLEX: CPT | Performed by: RADIOLOGY

## 2017-06-05 PROCEDURE — S5010 5% DEXTROSE AND 0.45% SALINE: HCPCS | Mod: ZF | Performed by: OBSTETRICS & GYNECOLOGY

## 2017-06-05 PROCEDURE — 96366 THER/PROPH/DIAG IV INF ADDON: CPT

## 2017-06-05 PROCEDURE — 96375 TX/PRO/DX INJ NEW DRUG ADDON: CPT

## 2017-06-05 PROCEDURE — 25800025 ZZH RX 258: Mod: ZF | Performed by: OBSTETRICS & GYNECOLOGY

## 2017-06-05 PROCEDURE — 85025 COMPLETE CBC W/AUTO DIFF WBC: CPT | Performed by: OBSTETRICS & GYNECOLOGY

## 2017-06-05 PROCEDURE — 96413 CHEMO IV INFUSION 1 HR: CPT

## 2017-06-05 PROCEDURE — 96367 TX/PROPH/DG ADDL SEQ IV INF: CPT

## 2017-06-05 PROCEDURE — 40000141 ZZH STATISTIC PERIPHERAL IV START W/O US GUIDANCE

## 2017-06-05 RX ORDER — DEXTROSE, SODIUM CHLORIDE, AND POTASSIUM CHLORIDE 5; .45; .15 G/100ML; G/100ML; G/100ML
2000 INJECTION INTRAVENOUS ONCE
Status: COMPLETED | OUTPATIENT
Start: 2017-06-05 | End: 2017-06-05

## 2017-06-05 RX ORDER — PALONOSETRON 0.05 MG/ML
0.25 INJECTION, SOLUTION INTRAVENOUS ONCE
Status: COMPLETED | OUTPATIENT
Start: 2017-06-05 | End: 2017-06-05

## 2017-06-05 RX ADMIN — PEGFILGRASTIM 6 MG: KIT SUBCUTANEOUS at 11:49

## 2017-06-05 RX ADMIN — POTASSIUM CHLORIDE, DEXTROSE MONOHYDRATE AND SODIUM CHLORIDE 1000 ML: 150; 5; 450 INJECTION, SOLUTION INTRAVENOUS at 09:06

## 2017-06-05 RX ADMIN — PALONOSETRON HYDROCHLORIDE 0.25 MG: 0.25 INJECTION INTRAVENOUS at 08:08

## 2017-06-05 RX ADMIN — CISPLATIN 70 MG: 1 INJECTION, SOLUTION INTRAVENOUS at 10:33

## 2017-06-05 RX ADMIN — SODIUM CHLORIDE 150 MG: 9 INJECTION, SOLUTION INTRAVENOUS at 08:27

## 2017-06-05 RX ADMIN — MAGNESIUM SULFATE HEPTAHYDRATE: 500 INJECTION, SOLUTION INTRAMUSCULAR; INTRAVENOUS at 09:07

## 2017-06-05 RX ADMIN — DEXAMETHASONE SODIUM PHOSPHATE 12 MG: 10 INJECTION, SOLUTION INTRAMUSCULAR; INTRAVENOUS at 08:15

## 2017-06-05 NOTE — NURSING NOTE
Chief Complaint   Patient presents with     Blood Draw     left arm, vitals taken      Tia Erickson CMA

## 2017-06-05 NOTE — PATIENT INSTRUCTIONS
-Your on-body neulasta should start around 2:50pm tomorrow and should finish around 3:35  -Monitor the light on he on-body neulasta and call if if ever turns red  -monitor for wetness at the on-body site and call if you notice that it is leaking  -take over the counter claritin for the next few days in order to prevent bone pain associated with the neulasta injection     Contact Numbers    [facility] [place]: [phone]  [facility] [place]:  [phone]    Call triage with chills and/or temperature greater than or equal to 100.5, uncontrolled nausea/vomiting, diarrhea, constipation, dizziness, shortness of breath, chest pain, bleeding, unexplained bruising, or any new/concerning symptoms, questions/concerns.     If you are having any concerning symptoms or wish to speak to a provider before your next infusion visit, please call your care coordinator or triage to notify them so we can adequately serve you.       After Hours: [phone]    If after hours, weekends, or holidays, call main hospital  and ask for Oncology doctor on call.           June 2017 Sunday Monday Tuesday Wednesday Thursday Friday Saturday                       1     PAC EVAL   11:15 AM   (60 min.)   6,  Pac [place]   [facility]     PAC RN ASSESSMENT   12:15 PM   (30 min.)   Rn, [facility]     PAC ANESTHESIA CONSULT   12:45 PM   (10 min.)   Anesthesiologist, [facility]     LAB    1:15 PM   (15 min.)    LAB   [facility] Lab     [facility] EXTERNAL RADIATION TREATMT    2:30 PM   (15 min.)   [facility] RAD ONC [facility]   Radiation Oncology Clinic     [facility] ON TREATMENT VISIT    2:45 PM   (15 min.)   Jazzy Dave MD   Radiation Oncology Clinic 2     [facility] EXTERNAL RADIATION TREATMT    2:30 PM   (15 min.)   [facility] RAD ONC [facility]   Radiation Oncology Clinic 3       4     5     [facility] LAB DRAW    6:30 AM   (15 min.)    [facility] DRAW   [facility]  Draw     [facility] ONC INFUSION 360    7:00 AM   (360 min.)   [facility] ONCOLOGY INFUSION   [facility] EXTERNAL RADIATION TREATMT    2:30 PM   (15 min.)   [facility] RAD ONC VARIAN   Radiation Oncology Clinic 6     7     8     9     10       11     12     13     [facility] TCT/SIM SUITE    6:00 AM   (90 min.)   Nicole Ward MD   Radiation Oncology Clinic     INSERT INTERSTITIAL NEEDLE, ULTRASOUND GUIDED    8:00 AM   Nicole Ward MD   [facility] OR     [facility] TCT/SIM SUITE   11:00 AM   (60 min.)   Nicole Ward MD   Radiation Oncology [facility] TCT/SIM SUITE    3:00 PM   (60 min.)   Nicole Ward MD   Radiation Oncology [facility] 14     [facility] TCT/SIM SUITE    8:00 AM   (90 min.)   Michelle Carrion MD   Radiation Oncology [facility] TCT/SIM SUITE    2:30 PM   (60 min.)   Michelle Carrion MD   Radiation Oncology [facility] 15     [facility] TCT/SIM SUITE    8:00 AM   (60 min.)   Michelle Carrion MD   Radiation Oncology [facility] TCT/SIM SUITE    2:30 PM   (30 min.)   Michelle Carrion MD   Radiation Oncology [facility] TCT/SIM SUITE    3:55 PM   (30 min.)   Michelle Carrion MD   Radiation Oncology Clinic     REMOVE RADIATION NEEDLES CERVIX    4:00 PM   Michelle Carrion MD   [facility] OR 16     17       18     19     20     21     22     23     24       25     26     27     28     29     30 July 2017 Sunday Monday Tuesday Wednesday Thursday Friday Saturday                                 1       2     3     4     5     6     7     8       9     10     11     12     13     14     15       16     17     18     19     20     21     22       23     24     25     26     27     28     [facility] RETURN    8:45 AM   (30 min.)   Kalee Lee MD   [facility] 29       30     31                                          Recent Results (from the past 24 hour(s))   CBC with platelets differential    Collection Time: 06/05/17  7:11 AM   Result Value Ref Range    WBC 1.6 (L) 4.0 -  11.0 10e9/L    RBC Count 3.00 (L) 3.8 - 5.2 10e12/L    Hemoglobin 9.2 (L) 11.7 - 15.7 g/dL    Hematocrit 27.0 (L) 35.0 - 47.0 %    MCV 90 78 - 100 fl    MCH 30.7 26.5 - 33.0 pg    MCHC 34.1 31.5 - 36.5 g/dL    RDW 15.4 (H) 10.0 - 15.0 %    Platelet Count 149 (L) 150 - 450 10e9/L    Diff Method Automated Method     % Neutrophils 56.5 %    % Lymphocytes 19.0 %    % Monocytes 9.8 %    % Eosinophils 12.9 %    % Basophils 0.6 %    % Immature Granulocytes 1.2 %    Nucleated RBCs 0 0 /100    Absolute Neutrophil 0.9 (L) 1.6 - 8.3 10e9/L    Absolute Lymphocytes 0.3 (L) 0.8 - 5.3 10e9/L    Absolute Monocytes 0.2 0.0 - 1.3 10e9/L    Absolute Eosinophils 0.2 0.0 - 0.7 10e9/L    Absolute Basophils 0.0 0.0 - 0.2 10e9/L    Abs Immature Granulocytes 0.0 0 - 0.4 10e9/L    Absolute Nucleated RBC 0.0    Comprehensive metabolic panel    Collection Time: 06/05/17  7:11 AM   Result Value Ref Range    Sodium 134 133 - 144 mmol/L    Potassium 3.4 3.4 - 5.3 mmol/L    Chloride 100 94 - 109 mmol/L    Carbon Dioxide 26 20 - 32 mmol/L    Anion Gap 8 3 - 14 mmol/L    Glucose 105 (H) 70 - 99 mg/dL    Urea Nitrogen 9 7 - 30 mg/dL    Creatinine 0.77 0.52 - 1.04 mg/dL    GFR Estimate 73 >60 mL/min/1.7m2    GFR Estimate If Black 88 >60 mL/min/1.7m2    Calcium 7.9 (L) 8.5 - 10.1 mg/dL    Bilirubin Total 0.3 0.2 - 1.3 mg/dL    Albumin 3.1 (L) 3.4 - 5.0 g/dL    Protein Total 7.1 6.8 - 8.8 g/dL    Alkaline Phosphatase 91 40 - 150 U/L    ALT 41 0 - 50 U/L    AST 39 0 - 45 U/L   Magnesium    Collection Time: 06/05/17  7:11 AM   Result Value Ref Range    Magnesium 1.2 (L) 1.6 - 2.3 mg/dL

## 2017-06-05 NOTE — MR AVS SNAPSHOT
After Visit Summary   6/5/2017    Savannah Camacho    MRN: [id number]           Patient Information     Date Of Birth          1940        Visit Information        Provider Department      6/5/2017 7:00 AM  10 ATC;  ONCOLOGY INFUSION [facility]        Today's Diagnoses     Malignant neoplasm of exocervix (H)    -  1      Care Instructions    -Your on-body neulasta should start around 2:50pm tomorrow and should finish around 3:35  -Monitor the light on he on-body neulasta and call if if ever turns red  -monitor for wetness at the on-body site and call if you notice that it is leaking  -take over the counter claritin for the next few days in order to prevent bone pain associated with the neulasta injection     Contact Numbers    [facility] [place]: [phone]  [facility] [place]riage:  [phone]    Call triage with chills and/or temperature greater than or equal to 100.5, uncontrolled nausea/vomiting, diarrhea, constipation, dizziness, shortness of breath, chest pain, bleeding, unexplained bruising, or any new/concerning symptoms, questions/concerns.     If you are having any concerning symptoms or wish to speak to a provider before your next infusion visit, please call your care coordinator or triage to notify them so we can adequately serve you.       After Hours: [phone]    If after hours, weekends, or holidays, call main hospital  and ask for Oncology doctor on call.           June 2017 Sunday Monday Tuesday Wednesday Thursday Friday Saturday                       1     PAC EVAL   11:15 AM   (60 min.)   6,  Pac [facility]     PAC RN ASSESSMENT   12:15 PM   (30 min.)   Rn, [facility]     PAC ANESTHESIA CONSULT   12:45 PM   (10 min.)   Anesthesiologist, [facility]     LAB    1:15 PM   (15 min.)   [facility]     [facility] EXTERNAL RADIATION TREATMT    2:30  PM   (15 min.)   [facility] RAD ONC [facility]   Radiation Oncology Clinic     [facility] ON TREATMENT VISIT    2:45 PM   (15 min.)   Jazzy Dave MD   Radiation Oncology Clinic 2     [facility] EXTERNAL RADIATION TREATMT    2:30 PM   (15 min.)   [facility] RAD ONC [facility]   Radiation Oncology Clinic 3       4     5     [facility] LAB DRAW    6:30 AM   (15 min.)    [facility] DRAW   [facility] Lab Draw     [facility] ONC INFUSION 360    7:00 AM   (360 min.)    ONCOLOGY INFUSION   [facility] EXTERNAL RADIATION TREATMT    2:30 PM   (15 min.)   [facility] RAD ONC [facility]   Radiation Oncology Clinic 6     7     8     9     10       11     12     13     [facility] TCT/SIM SUITE    6:00 AM   (90 min.)   Nicole Ward MD   Radiation Oncology Clinic     INSERT INTERSTITIAL NEEDLE, ULTRASOUND GUIDED    8:00 AM   Nicole Ward MD   [facility] OR     [facility] TCT/SIM SUITE   11:00 AM   (60 min.)   Nicole Ward MD   Radiation Oncology Clinic     [facility] TCT/SIM SUITE    3:00 PM   (60 min.)   Nicole Ward MD   Radiation Oncology Clinic 14     [facility] TCT/SIM SUITE    8:00 AM   (90 min.)   Michelle Carrion MD   Radiation Oncology [facility] TCT/SIM SUITE    2:30 PM   (60 min.)   Michelle Carrion MD   Rad[facility] Oncology Clinic 15     [facility] TCT/SIM SUITE    8:00 AM   (60 min.)   Michelle Carrion MD   Radiation Oncology Clinic     [facility] TCT/SIM SUITE    2:30 PM   (30 min.)   Michelle Carrion MD   Radiation Oncology Clinic     [facility] TCT/SIM SUITE    3:55 PM   (30 min.)   Michelle Carrion MD   Radiation Oncology Clinic     REMOVE RADIATION NEEDLES CERVIX    4:00 PM   Michelle Carrion MD   [facility] OR 16     17       18     19     20     21     22     23     24       25     26     27     28     29     30 July 2017 Sunday Monday Tuesday Wednesday Thursday Friday Saturday                                 1       2     3     4     5     6     7     8       9     10     11     12     13     14     15       16     17      18     19     20     21     22       23     24     25     26     27     28     [facility] RETURN    8:45 AM   (30 min.)   Kalee Lee MD   [facility] 29       30     31                                          Recent Results (from the past 24 hour(s))   CBC with platelets differential    Collection Time: 06/05/17  7:11 AM   Result Value Ref Range    WBC 1.6 (L) 4.0 - 11.0 10e9/L    RBC Count 3.00 (L) 3.8 - 5.2 10e12/L    Hemoglobin 9.2 (L) 11.7 - 15.7 g/dL    Hematocrit 27.0 (L) 35.0 - 47.0 %    MCV 90 78 - 100 fl    MCH 30.7 26.5 - 33.0 pg    MCHC 34.1 31.5 - 36.5 g/dL    RDW 15.4 (H) 10.0 - 15.0 %    Platelet Count 149 (L) 150 - 450 10e9/L    Diff Method Automated Method     % Neutrophils 56.5 %    % Lymphocytes 19.0 %    % Monocytes 9.8 %    % Eosinophils 12.9 %    % Basophils 0.6 %    % Immature Granulocytes 1.2 %    Nucleated RBCs 0 0 /100    Absolute Neutrophil 0.9 (L) 1.6 - 8.3 10e9/L    Absolute Lymphocytes 0.3 (L) 0.8 - 5.3 10e9/L    Absolute Monocytes 0.2 0.0 - 1.3 10e9/L    Absolute Eosinophils 0.2 0.0 - 0.7 10e9/L    Absolute Basophils 0.0 0.0 - 0.2 10e9/L    Abs Immature Granulocytes 0.0 0 - 0.4 10e9/L    Absolute Nucleated RBC 0.0    Comprehensive metabolic panel    Collection Time: 06/05/17  7:11 AM   Result Value Ref Range    Sodium 134 133 - 144 mmol/L    Potassium 3.4 3.4 - 5.3 mmol/L    Chloride 100 94 - 109 mmol/L    Carbon Dioxide 26 20 - 32 mmol/L    Anion Gap 8 3 - 14 mmol/L    Glucose 105 (H) 70 - 99 mg/dL    Urea Nitrogen 9 7 - 30 mg/dL    Creatinine 0.77 0.52 - 1.04 mg/dL    GFR Estimate 73 >60 mL/min/1.7m2    GFR Estimate If Black 88 >60 mL/min/1.7m2    Calcium 7.9 (L) 8.5 - 10.1 mg/dL    Bilirubin Total 0.3 0.2 - 1.3 mg/dL    Albumin 3.1 (L) 3.4 - 5.0 g/dL    Protein Total 7.1 6.8 - 8.8 g/dL    Alkaline Phosphatase 91 40 - 150 U/L    ALT 41 0 - 50 U/L    AST 39 0 - 45 U/L   Magnesium    Collection Time: 06/05/17  7:11 AM   Result Value Ref Range    Magnesium 1.2 (L)  1.6 - 2.3 mg/dL                   Follow-ups after your visit        Your next 10 appointments already scheduled     Jun 05, 2017  2:30 PM CDT   EXTERNAL RADIATION TREATMENT with [facility] RAD ONC VARIAN   Radiation Oncology Clinic ([facility])    [facility]  1st Floor  [address] [address]   [phone]            Jun 13, 2017  7:55 AM CDT   (Arrive by 6:00 AM)   TCT/SIM Suite Visit with Nicole Ward MD   Radiation Oncology Clinic ([facility])    [facility]  1st Floor  [address] [address]   [phone]            Jun 13, 2017   Procedure with Nicole Ward MD   [facility] [place], Same Day Surgery (--)    [address] [address]   [phone]            Jun 13, 2017 11:00 AM CDT   TCT/SIM Suite Visit with Nicole Ward MD   Radiation Oncology Clinic ([facility])    [facility]  1st Floor  [address] [address]   [phone]            Jun 13, 2017  3:00 PM CDT   TCT/SIM Suite Visit with Nicole Ward MD   Radiation Oncology Clinic ([facility])    [facility]  1st Floor  [address] [address]   [phone]            Jun 14, 2017  8:00 AM CDT   TCT/SIM Suite Visit with Michelle Carrion MD   Radiation Oncology Clinic ([facility])    [facility]  1st Floor  [address] [address]   [phone]            Jun 14, 2017  2:30 PM CDT   TCT/SIM Suite Visit with Michelle Carrion MD   Radiation Oncology Clinic ([facility])    [facility]  1st Floor  [address] [address]   [phone]            Tom 15, 2017  8:00 AM CDT   TCT/SIM Suite Visit with Michelle Carrion MD   Radiation Oncology Clinic ([facility])    [facility]  [facility]  "Floor  [address] [address]   [phone]            Tom 15, 2017  2:30 PM CDT   TCT/SIM Suite Visit with Michelle Carrion MD   Radiation Oncology Clinic ([facility])    [facility]  1st Floor  [address] [address]   [phone]            Tom 15, 2017  3:55 PM CDT   TCT/SIM Suite Visit with Michelle Carrion MD   Radiation Oncology Clinic ([facility])    [facility]  1st Floor  [address] [address]   [phone]              Who to contact     If you have questions or need follow up information about today's clinic visit or your schedule please contact [facility] directly at [phone].  Normal or non-critical lab and imaging results will be communicated to you by [employer], letter or phone within 4 business days after the clinic has received the results. If you do not hear from us within 7 days, please contact the clinic through [employer] or phone. If you have a critical or abnormal lab result, we will notify you by phone as soon as possible.  Submit refill requests through [employer] or call your pharmacy and they will forward the refill request to us. Please allow 3 business days for your refill to be completed.          Additional Information About Your Visit        [employer] Information     [employer] lets you send messages to your doctor, view your test results, renew your prescriptions, schedule appointments and more. To sign up, go to [email or web address] . Click on \"Log in\" on the left side of the screen, which will take you to the Welcome page. Then click on \"Sign up Now\" on the right side of the page.     You will be asked to enter the access code listed below, as well as some personal information. Please follow the directions to create your username and password.     Your access code is: [email or web address]  Expires: 7/18/2017 11:20 AM     Your access code " will  in 90 days. If you need help or a new code, please call your [facility] or [phone].        Care EveryWhere ID     This is your Care EveryWhere ID. This could be used by other organizations to access your [facility] medical records  [id number]        Your Vitals Were     Pulse Temperature Respirations Pulse Oximetry BMI (Body Mass Index)       84 97.7  F (36.5  C) (Oral) 16 98% 23.65 kg/m2        Blood Pressure from Last 3 Encounters:   17 104/62   17 120/66   17 116/82    Weight from Last 3 Encounters:   17 64.5 kg (142 lb 1.6 oz)   17 67 kg (147 lb 11.2 oz)   17 64.7 kg (142 lb 11.2 oz)              We Performed the Following     CBC with platelets differential     Comprehensive metabolic panel     Magnesium        Primary Care Provider Office Phone # Fax #    Adán Lanier [phone]       [place] [address] [address] [facility] [address]        Thank you!     Thank you for choosing [facility]  for your care. Our goal is always to provide you with excellent care. Hearing back from our patients is one way we can continue to improve our services. Please take a few minutes to complete the written survey that you may receive in the mail after your visit with us. Thank you!             Your Updated Medication List - Protect others around you: Learn how to safely use, store and throw away your medicines at www.disposemymeds.org.          This list is accurate as of: 17 12:18 PM.  Always use your most recent med list.                   Brand Name Dispense Instructions for use    albuterol 108 (90 BASE) MCG/ACT Inhaler    PROAIR HFA/PROVENTIL HFA/VENTOLIN HFA     Inhale 2 puffs into the lungs every 6 hours as needed Reported on 2017       ASPIRIN PO      Take 81 mg by mouth daily (with dinner)       COREG PO      Take 6.25 mg by mouth 2 times daily       hydrochlorothiazide 25 MG tablet    HYDRODIURIL      daily On hold for now 5/17/17, Reported on 5/16/2017       lisinopril 5 MG tablet    PRINIVIL/ZESTRIL     Take 5 mg by mouth 2 times daily Reported on 5/5/2017       loperamide 2 MG capsule    IMODIUM     Take 2 mg by mouth 4 times daily as needed for diarrhea       LORazepam 1 MG tablet    ATIVAN    30 tablet    Take 1 tablet (1 mg) by mouth every 6 hours as needed (nausea/vomiting, anxiety or sleep )       MULTIVITAMIN ADULT PO      Take by mouth every morning       prochlorperazine 10 MG tablet    COMPAZINE    30 tablet    Take 1 tablet (10 mg) by mouth every 6 hours as needed (nausea/vomiting)       SIMVASTATIN PO      Take 40 mg by mouth once On hold 5/17/17 per Gyn onc  Reported on 5/16/2017       SINGULAIR PO      Take 10 mg by mouth every evening Reported on 5/16/2017

## 2017-06-05 NOTE — PROGRESS NOTES
Infusion Nursing Note:  Savannah Camacho presents today for cycle 1, day 29 cisplatin.    Patient seen by provider today: No   present during visit today: Not Applicable.    Note: Patient denies any fevers, chills, or other infectious symptoms.  She has had mild nausea controlled with oral antiemetics.  She is eating and drinking ok.  Her diarrhea is being managed by PO imodium. Today is her last scheduled chemotherapy prior to starting interstitial radiation next week.      Intravenous Access:  Peripheral IV placed by vascular access    Treatment Conditions:  Lab Results   Component Value Date    HGB 9.2 06/05/2017     Lab Results   Component Value Date    WBC 1.6 06/05/2017      Lab Results   Component Value Date    ANEU 0.9 06/05/2017     Lab Results   Component Value Date     06/05/2017      Lab Results   Component Value Date     06/05/2017                   Lab Results   Component Value Date    POTASSIUM 3.4 06/05/2017           Lab Results   Component Value Date    MAG 1.2 06/05/2017            Lab Results   Component Value Date    CR 0.77 06/05/2017                   Lab Results   Component Value Date    PEPE 7.9 06/05/2017                Lab Results   Component Value Date    BILITOTAL 0.3 06/05/2017           Lab Results   Component Value Date    ALBUMIN 3.1 06/05/2017                    Lab Results   Component Value Date    ALT 41 06/05/2017           Lab Results   Component Value Date    AST 39 06/05/2017     Results reviewed, labs did NOT meet treatment parameters:  6/5/2017 0756 TORB Dr Janet Arriaga MD/Gianna Barrera RN  -ok to give chemotherapy today with ANC 0.9     Dr Yanna Arriaga added on-body neulasta to today's treatment since it is her last infusion.  Magnesium replacement given IV per protocol for Magnesium level 1.2 today   Patient educated on neutropenic precautions and was reminded to call or go to the ED with any fevers >100.4, shaking chills, or other infectious  symptoms.  Patient verbalized understanding.        Post Infusion Assessment:  Patient tolerated infusion without incident.  Patient was voiding in good amounts pre, during, and post cisplatin infusion   Blood return noted pre and post infusion.  Site patent and intact, free from redness, edema or discomfort.  No evidence of extravasations.  Access discontinued per protocol.    Neulasta Onpro On-Body injector applied to right arm at 1150 with light facing down.  Writer discussed Neulasta injection would start on 6/6 at 1450, approximately 27 hours after application applied today.  Written and Verbal instruction reviewed with patient.  Pt instructed when the dose delivery starts, it will take about 45 minutes to complete. Patient was encouraged to take PO claritin tomorrow morning and then for the following 2-3 days to prevent bone pain.   Pt aware Neulasta Onpro On-Body should have green flashing light and to call triage or on-call MD if injector flashes red or appears to be leaking. Pt aware to keep Onpro On-Body Neualsta 4 inches away from electrical equipment and to avoid showering 4 hours prior to injection.       Discharge Plan:   Patient declined prescription refills.  Discharge instructions reviewed with: Patient.  Patient and/or family verbalized understanding of discharge instructions and all questions answered.  Copy of AVS reviewed with patient and/or family.  Patient will start her interstitial radiation next week and will return 7/28 for follow up with Dr Kalee Lee.   Patient discharged in stable condition accompanied by: .  Departure Mode: Ambulatory.  Face to Face time: 6 minutes.    Gianna Barrera RN

## 2017-06-13 ENCOUNTER — ALLIED HEALTH/NURSE VISIT (OUTPATIENT)
Dept: RADIATION ONCOLOGY | Facility: CLINIC | Age: 77
End: 2017-06-13
Attending: RADIOLOGY
Payer: MEDICARE

## 2017-06-13 ENCOUNTER — APPOINTMENT (OUTPATIENT)
Dept: ULTRASOUND IMAGING | Facility: CLINIC | Age: 77
DRG: 741 | End: 2017-06-13
Attending: RADIOLOGY
Payer: MEDICARE

## 2017-06-13 ENCOUNTER — ANESTHESIA (OUTPATIENT)
Dept: SURGERY | Facility: CLINIC | Age: 77
DRG: 741 | End: 2017-06-13
Payer: MEDICARE

## 2017-06-13 ENCOUNTER — HOSPITAL ENCOUNTER (INPATIENT)
Facility: CLINIC | Age: 77
LOS: 2 days | Discharge: HOME | DRG: 741 | End: 2017-06-15
Attending: RADIOLOGY | Admitting: OBSTETRICS & GYNECOLOGY
Payer: MEDICARE

## 2017-06-13 ENCOUNTER — ANESTHESIA EVENT (OUTPATIENT)
Dept: SURGERY | Facility: CLINIC | Age: 77
DRG: 741 | End: 2017-06-13
Payer: MEDICARE

## 2017-06-13 DIAGNOSIS — C53.1 MALIGNANT NEOPLASM OF EXOCERVIX (H): Primary | ICD-10-CM

## 2017-06-13 DIAGNOSIS — C53.1 MALIGNANT NEOPLASM OF EXOCERVIX (H): ICD-10-CM

## 2017-06-13 PROBLEM — C53.9 CERVICAL CANCER (H): Status: ACTIVE | Noted: 2017-06-13

## 2017-06-13 LAB
ANION GAP SERPL CALCULATED.3IONS-SCNC: 5 MMOL/L (ref 3–14)
BUN SERPL-MCNC: 9 MG/DL (ref 7–30)
CALCIUM SERPL-MCNC: 7.9 MG/DL (ref 8.5–10.1)
CHLORIDE SERPL-SCNC: 102 MMOL/L (ref 94–109)
CO2 SERPL-SCNC: 27 MMOL/L (ref 20–32)
CREAT SERPL-MCNC: 0.8 MG/DL (ref 0.52–1.04)
GFR SERPL CREATININE-BSD FRML MDRD: 69 ML/MIN/1.7M2
GLUCOSE SERPL-MCNC: 154 MG/DL (ref 70–99)
MAGNESIUM SERPL-MCNC: 1 MG/DL (ref 1.6–2.3)
PLATELET # BLD AUTO: 68 10E9/L (ref 150–450)
POTASSIUM SERPL-SCNC: 4.2 MMOL/L (ref 3.4–5.3)
SODIUM SERPL-SCNC: 134 MMOL/L (ref 133–144)

## 2017-06-13 PROCEDURE — 36000057 ZZH SURGERY LEVEL 3 1ST 30 MIN - UMMC: Performed by: RADIOLOGY

## 2017-06-13 PROCEDURE — 76857 US EXAM PELVIC LIMITED: CPT | Mod: TC

## 2017-06-13 PROCEDURE — 25000128 H RX IP 250 OP 636: Performed by: ANESTHESIOLOGY

## 2017-06-13 PROCEDURE — 36000059 ZZH SURGERY LEVEL 3 EA 15 ADDTL MIN UMMC: Performed by: RADIOLOGY

## 2017-06-13 PROCEDURE — 25000125 ZZHC RX 250: Performed by: NURSE ANESTHETIST, CERTIFIED REGISTERED

## 2017-06-13 PROCEDURE — 25000566 ZZH SEVOFLURANE, EA 15 MIN: Performed by: RADIOLOGY

## 2017-06-13 PROCEDURE — 12000001 ZZH R&B MED SURG/OB UMMC

## 2017-06-13 PROCEDURE — S0020 INJECTION, BUPIVICAINE HYDRO: HCPCS | Performed by: NURSE ANESTHETIST, CERTIFIED REGISTERED

## 2017-06-13 PROCEDURE — 83735 ASSAY OF MAGNESIUM: CPT | Performed by: NURSE PRACTITIONER

## 2017-06-13 PROCEDURE — 37000008 ZZH ANESTHESIA TECHNICAL FEE, 1ST 30 MIN: Performed by: RADIOLOGY

## 2017-06-13 PROCEDURE — 25000125 ZZHC RX 250: Performed by: ANESTHESIOLOGY

## 2017-06-13 PROCEDURE — 25000128 H RX IP 250 OP 636: Performed by: NURSE ANESTHETIST, CERTIFIED REGISTERED

## 2017-06-13 PROCEDURE — 71000014 ZZH RECOVERY PHASE 1 LEVEL 2 FIRST HR: Performed by: RADIOLOGY

## 2017-06-13 PROCEDURE — S0077 INJECTION, CLINDAMYCIN PHOSP: HCPCS | Performed by: RADIOLOGY

## 2017-06-13 PROCEDURE — 99231 SBSQ HOSP IP/OBS SF/LOW 25: CPT | Performed by: NURSE PRACTITIONER

## 2017-06-13 PROCEDURE — DU119YZ HIGH DOSE RATE (HDR) BRACHYTHERAPY OF CERVIX USING OTHER ISOTOPE: ICD-10-PCS | Performed by: RADIOLOGY

## 2017-06-13 PROCEDURE — 25000132 ZZH RX MED GY IP 250 OP 250 PS 637: Mod: GY | Performed by: NURSE PRACTITIONER

## 2017-06-13 PROCEDURE — 40000171 ZZH STATISTIC PRE-PROCEDURE ASSESSMENT III: Performed by: RADIOLOGY

## 2017-06-13 PROCEDURE — 27210794 ZZH OR GENERAL SUPPLY STERILE: Performed by: RADIOLOGY

## 2017-06-13 PROCEDURE — 0UHD73Z INSERTION OF INFUSION DEVICE INTO UTERUS AND CERVIX, VIA NATURAL OR ARTIFICIAL OPENING: ICD-10-PCS | Performed by: RADIOLOGY

## 2017-06-13 PROCEDURE — 71000015 ZZH RECOVERY PHASE 1 LEVEL 2 EA ADDTL HR: Performed by: RADIOLOGY

## 2017-06-13 PROCEDURE — 80048 BASIC METABOLIC PNL TOTAL CA: CPT | Performed by: NURSE PRACTITIONER

## 2017-06-13 PROCEDURE — 25000125 ZZHC RX 250: Performed by: RADIOLOGY

## 2017-06-13 PROCEDURE — 25000128 H RX IP 250 OP 636: Performed by: NURSE PRACTITIONER

## 2017-06-13 PROCEDURE — 85049 AUTOMATED PLATELET COUNT: CPT | Performed by: NURSE PRACTITIONER

## 2017-06-13 PROCEDURE — A9270 NON-COVERED ITEM OR SERVICE: HCPCS | Mod: GY | Performed by: RADIOLOGY

## 2017-06-13 PROCEDURE — 25000132 ZZH RX MED GY IP 250 OP 250 PS 637: Mod: GY | Performed by: RADIOLOGY

## 2017-06-13 PROCEDURE — A9270 NON-COVERED ITEM OR SERVICE: HCPCS | Mod: GY | Performed by: NURSE PRACTITIONER

## 2017-06-13 PROCEDURE — 36415 COLL VENOUS BLD VENIPUNCTURE: CPT | Performed by: NURSE PRACTITIONER

## 2017-06-13 PROCEDURE — S0020 INJECTION, BUPIVICAINE HYDRO: HCPCS | Performed by: ANESTHESIOLOGY

## 2017-06-13 PROCEDURE — 37000009 ZZH ANESTHESIA TECHNICAL FEE, EACH ADDTL 15 MIN: Performed by: RADIOLOGY

## 2017-06-13 RX ORDER — NALBUPHINE HYDROCHLORIDE 10 MG/ML
2.5-5 INJECTION, SOLUTION INTRAMUSCULAR; INTRAVENOUS; SUBCUTANEOUS EVERY 6 HOURS PRN
Status: DISCONTINUED | OUTPATIENT
Start: 2017-06-13 | End: 2017-06-13 | Stop reason: HOSPADM

## 2017-06-13 RX ORDER — SODIUM CHLORIDE, SODIUM LACTATE, POTASSIUM CHLORIDE, CALCIUM CHLORIDE 600; 310; 30; 20 MG/100ML; MG/100ML; MG/100ML; MG/100ML
INJECTION, SOLUTION INTRAVENOUS CONTINUOUS
Status: DISCONTINUED | OUTPATIENT
Start: 2017-06-13 | End: 2017-06-13 | Stop reason: HOSPADM

## 2017-06-13 RX ORDER — LOPERAMIDE HCL 2 MG
2 CAPSULE ORAL 4 TIMES DAILY PRN
Status: DISCONTINUED | OUTPATIENT
Start: 2017-06-13 | End: 2017-06-15 | Stop reason: HOSPADM

## 2017-06-13 RX ORDER — POTASSIUM CHLORIDE 7.45 MG/ML
10 INJECTION INTRAVENOUS
Status: DISCONTINUED | OUTPATIENT
Start: 2017-06-13 | End: 2017-06-15 | Stop reason: HOSPADM

## 2017-06-13 RX ORDER — NALOXONE HYDROCHLORIDE 0.4 MG/ML
.1-.4 INJECTION, SOLUTION INTRAMUSCULAR; INTRAVENOUS; SUBCUTANEOUS
Status: DISCONTINUED | OUTPATIENT
Start: 2017-06-13 | End: 2017-06-13 | Stop reason: HOSPADM

## 2017-06-13 RX ORDER — POTASSIUM CL/LIDO/0.9 % NACL 10MEQ/0.1L
10 INTRAVENOUS SOLUTION, PIGGYBACK (ML) INTRAVENOUS
Status: DISCONTINUED | OUTPATIENT
Start: 2017-06-13 | End: 2017-06-15 | Stop reason: HOSPADM

## 2017-06-13 RX ORDER — LIDOCAINE HYDROCHLORIDE 20 MG/ML
INJECTION, SOLUTION INFILTRATION; PERINEURAL PRN
Status: DISCONTINUED | OUTPATIENT
Start: 2017-06-13 | End: 2017-06-13

## 2017-06-13 RX ORDER — ONDANSETRON 4 MG/1
4 TABLET, ORALLY DISINTEGRATING ORAL EVERY 30 MIN PRN
Status: DISCONTINUED | OUTPATIENT
Start: 2017-06-13 | End: 2017-06-13 | Stop reason: HOSPADM

## 2017-06-13 RX ORDER — LIDOCAINE HYDROCHLORIDE AND EPINEPHRINE 15; 5 MG/ML; UG/ML
INJECTION, SOLUTION EPIDURAL PRN
Status: DISCONTINUED | OUTPATIENT
Start: 2017-06-13 | End: 2017-06-13

## 2017-06-13 RX ORDER — ONDANSETRON 2 MG/ML
4 INJECTION INTRAMUSCULAR; INTRAVENOUS EVERY 30 MIN PRN
Status: DISCONTINUED | OUTPATIENT
Start: 2017-06-13 | End: 2017-06-13 | Stop reason: HOSPADM

## 2017-06-13 RX ORDER — LIDOCAINE 40 MG/G
CREAM TOPICAL
Status: DISCONTINUED | OUTPATIENT
Start: 2017-06-13 | End: 2017-06-13 | Stop reason: HOSPADM

## 2017-06-13 RX ORDER — DIPHENOXYLATE HCL/ATROPINE 2.5-.025MG
1-2 TABLET ORAL 4 TIMES DAILY PRN
Status: DISCONTINUED | OUTPATIENT
Start: 2017-06-13 | End: 2017-06-15 | Stop reason: HOSPADM

## 2017-06-13 RX ORDER — PROPOFOL 10 MG/ML
INJECTION, EMULSION INTRAVENOUS PRN
Status: DISCONTINUED | OUTPATIENT
Start: 2017-06-13 | End: 2017-06-13

## 2017-06-13 RX ORDER — ONDANSETRON 2 MG/ML
INJECTION INTRAMUSCULAR; INTRAVENOUS PRN
Status: DISCONTINUED | OUTPATIENT
Start: 2017-06-13 | End: 2017-06-13

## 2017-06-13 RX ORDER — POTASSIUM CHLORIDE 29.8 MG/ML
20 INJECTION INTRAVENOUS
Status: DISCONTINUED | OUTPATIENT
Start: 2017-06-13 | End: 2017-06-15 | Stop reason: HOSPADM

## 2017-06-13 RX ORDER — POTASSIUM CHLORIDE 1.5 G/1.58G
20-40 POWDER, FOR SOLUTION ORAL
Status: DISCONTINUED | OUTPATIENT
Start: 2017-06-13 | End: 2017-06-15 | Stop reason: HOSPADM

## 2017-06-13 RX ORDER — ALBUTEROL SULFATE 90 UG/1
2 AEROSOL, METERED RESPIRATORY (INHALATION) EVERY 6 HOURS PRN
Status: DISCONTINUED | OUTPATIENT
Start: 2017-06-13 | End: 2017-06-15 | Stop reason: HOSPADM

## 2017-06-13 RX ORDER — MULTIVITAMIN,THERAPEUTIC
1 TABLET ORAL EVERY MORNING
Status: DISCONTINUED | OUTPATIENT
Start: 2017-06-14 | End: 2017-06-15 | Stop reason: HOSPADM

## 2017-06-13 RX ORDER — NALOXONE HYDROCHLORIDE 0.4 MG/ML
.1-.4 INJECTION, SOLUTION INTRAMUSCULAR; INTRAVENOUS; SUBCUTANEOUS
Status: DISCONTINUED | OUTPATIENT
Start: 2017-06-13 | End: 2017-06-15 | Stop reason: HOSPADM

## 2017-06-13 RX ORDER — FENTANYL CITRATE 50 UG/ML
25-50 INJECTION, SOLUTION INTRAMUSCULAR; INTRAVENOUS
Status: DISCONTINUED | OUTPATIENT
Start: 2017-06-13 | End: 2017-06-13 | Stop reason: HOSPADM

## 2017-06-13 RX ORDER — POTASSIUM CHLORIDE 750 MG/1
20-40 TABLET, EXTENDED RELEASE ORAL
Status: DISCONTINUED | OUTPATIENT
Start: 2017-06-13 | End: 2017-06-15 | Stop reason: HOSPADM

## 2017-06-13 RX ORDER — SIMVASTATIN 40 MG
40 TABLET ORAL
Status: DISCONTINUED | OUTPATIENT
Start: 2017-06-13 | End: 2017-06-15 | Stop reason: HOSPADM

## 2017-06-13 RX ORDER — PROCHLORPERAZINE MALEATE 5 MG
5 TABLET ORAL EVERY 6 HOURS PRN
Status: DISCONTINUED | OUTPATIENT
Start: 2017-06-13 | End: 2017-06-15 | Stop reason: HOSPADM

## 2017-06-13 RX ORDER — ACETAMINOPHEN 325 MG/1
650 TABLET ORAL EVERY 4 HOURS PRN
Status: DISCONTINUED | OUTPATIENT
Start: 2017-06-13 | End: 2017-06-15 | Stop reason: HOSPADM

## 2017-06-13 RX ORDER — FENTANYL CITRATE 50 UG/ML
INJECTION, SOLUTION INTRAMUSCULAR; INTRAVENOUS PRN
Status: DISCONTINUED | OUTPATIENT
Start: 2017-06-13 | End: 2017-06-13

## 2017-06-13 RX ORDER — OXYCODONE HYDROCHLORIDE 5 MG/1
5 TABLET ORAL EVERY 4 HOURS PRN
Status: DISCONTINUED | OUTPATIENT
Start: 2017-06-13 | End: 2017-06-15 | Stop reason: HOSPADM

## 2017-06-13 RX ORDER — NALBUPHINE HYDROCHLORIDE 10 MG/ML
2.5-5 INJECTION, SOLUTION INTRAMUSCULAR; INTRAVENOUS; SUBCUTANEOUS EVERY 6 HOURS PRN
Status: DISCONTINUED | OUTPATIENT
Start: 2017-06-13 | End: 2017-06-14

## 2017-06-13 RX ORDER — PHENAZOPYRIDINE HYDROCHLORIDE 200 MG/1
200 TABLET, FILM COATED ORAL ONCE
Status: COMPLETED | OUTPATIENT
Start: 2017-06-13 | End: 2017-06-13

## 2017-06-13 RX ORDER — LISINOPRIL 5 MG/1
5 TABLET ORAL 2 TIMES DAILY
Status: DISCONTINUED | OUTPATIENT
Start: 2017-06-13 | End: 2017-06-15 | Stop reason: HOSPADM

## 2017-06-13 RX ORDER — MAGNESIUM SULFATE HEPTAHYDRATE 40 MG/ML
4 INJECTION, SOLUTION INTRAVENOUS EVERY 4 HOURS PRN
Status: DISCONTINUED | OUTPATIENT
Start: 2017-06-13 | End: 2017-06-15 | Stop reason: HOSPADM

## 2017-06-13 RX ORDER — CARVEDILOL 3.12 MG/1
6.25 TABLET ORAL 2 TIMES DAILY
Status: DISCONTINUED | OUTPATIENT
Start: 2017-06-13 | End: 2017-06-15 | Stop reason: HOSPADM

## 2017-06-13 RX ORDER — FENTANYL CITRATE 50 UG/ML
25-50 INJECTION, SOLUTION INTRAMUSCULAR; INTRAVENOUS
Status: COMPLETED | OUTPATIENT
Start: 2017-06-13 | End: 2017-06-13

## 2017-06-13 RX ORDER — NALOXONE HYDROCHLORIDE 0.4 MG/ML
.1-.4 INJECTION, SOLUTION INTRAMUSCULAR; INTRAVENOUS; SUBCUTANEOUS
Status: DISCONTINUED | OUTPATIENT
Start: 2017-06-13 | End: 2017-06-14

## 2017-06-13 RX ORDER — CLINDAMYCIN PHOSPHATE 900 MG/50ML
900 INJECTION, SOLUTION INTRAVENOUS
Status: COMPLETED | OUTPATIENT
Start: 2017-06-13 | End: 2017-06-13

## 2017-06-13 RX ORDER — DEXAMETHASONE SODIUM PHOSPHATE 4 MG/ML
INJECTION, SOLUTION INTRA-ARTICULAR; INTRALESIONAL; INTRAMUSCULAR; INTRAVENOUS; SOFT TISSUE PRN
Status: DISCONTINUED | OUTPATIENT
Start: 2017-06-13 | End: 2017-06-13

## 2017-06-13 RX ORDER — BUPIVACAINE HYDROCHLORIDE 2.5 MG/ML
INJECTION, SOLUTION EPIDURAL; INFILTRATION; INTRACAUDAL PRN
Status: DISCONTINUED | OUTPATIENT
Start: 2017-06-13 | End: 2017-06-13

## 2017-06-13 RX ORDER — MONTELUKAST SODIUM 10 MG/1
10 TABLET ORAL EVERY EVENING
Status: DISCONTINUED | OUTPATIENT
Start: 2017-06-13 | End: 2017-06-15 | Stop reason: HOSPADM

## 2017-06-13 RX ADMIN — CARVEDILOL 6.25 MG: 3.12 TABLET, FILM COATED ORAL at 20:40

## 2017-06-13 RX ADMIN — BUPIVACAINE HYDROCHLORIDE: 7.5 INJECTION, SOLUTION EPIDURAL; RETROBULBAR at 10:57

## 2017-06-13 RX ADMIN — FENTANYL CITRATE 25 MCG: 50 INJECTION, SOLUTION INTRAMUSCULAR; INTRAVENOUS at 07:13

## 2017-06-13 RX ADMIN — FENTANYL CITRATE 25 MCG: 50 INJECTION, SOLUTION INTRAMUSCULAR; INTRAVENOUS at 09:27

## 2017-06-13 RX ADMIN — FENTANYL CITRATE 25 MCG: 50 INJECTION, SOLUTION INTRAMUSCULAR; INTRAVENOUS at 08:46

## 2017-06-13 RX ADMIN — PROPOFOL 50 MG: 10 INJECTION, EMULSION INTRAVENOUS at 09:06

## 2017-06-13 RX ADMIN — BUPIVACAINE HYDROCHLORIDE 10 ML: 2.5 INJECTION, SOLUTION EPIDURAL; INFILTRATION; INTRACAUDAL at 10:01

## 2017-06-13 RX ADMIN — FENTANYL CITRATE 25 MCG: 50 INJECTION, SOLUTION INTRAMUSCULAR; INTRAVENOUS at 09:58

## 2017-06-13 RX ADMIN — PHENYLEPHRINE HYDROCHLORIDE 100 MCG: 10 INJECTION, SOLUTION INTRAMUSCULAR; INTRAVENOUS; SUBCUTANEOUS at 09:24

## 2017-06-13 RX ADMIN — SODIUM CHLORIDE, POTASSIUM CHLORIDE, SODIUM LACTATE AND CALCIUM CHLORIDE: 600; 310; 30; 20 INJECTION, SOLUTION INTRAVENOUS at 08:15

## 2017-06-13 RX ADMIN — PHENYLEPHRINE HYDROCHLORIDE 100 MCG: 10 INJECTION, SOLUTION INTRAMUSCULAR; INTRAVENOUS; SUBCUTANEOUS at 09:34

## 2017-06-13 RX ADMIN — FENTANYL CITRATE 25 MCG: 50 INJECTION, SOLUTION INTRAMUSCULAR; INTRAVENOUS at 09:09

## 2017-06-13 RX ADMIN — CLINDAMYCIN PHOSPHATE 900 MG: 18 INJECTION, SOLUTION INTRAVENOUS at 08:30

## 2017-06-13 RX ADMIN — FENTANYL CITRATE 25 MCG: 50 INJECTION, SOLUTION INTRAMUSCULAR; INTRAVENOUS at 09:55

## 2017-06-13 RX ADMIN — FENTANYL CITRATE 25 MCG: 50 INJECTION, SOLUTION INTRAMUSCULAR; INTRAVENOUS at 08:24

## 2017-06-13 RX ADMIN — LIDOCAINE HYDROCHLORIDE,EPINEPHRINE BITARTRATE 3 ML: 15; .005 INJECTION, SOLUTION EPIDURAL; INFILTRATION; INTRACAUDAL; PERINEURAL at 07:20

## 2017-06-13 RX ADMIN — FENTANYL CITRATE 50 MCG: 50 INJECTION, SOLUTION INTRAMUSCULAR; INTRAVENOUS at 09:22

## 2017-06-13 RX ADMIN — PHENYLEPHRINE HYDROCHLORIDE 100 MCG: 10 INJECTION, SOLUTION INTRAMUSCULAR; INTRAVENOUS; SUBCUTANEOUS at 08:40

## 2017-06-13 RX ADMIN — OXYCODONE HYDROCHLORIDE 5 MG: 5 TABLET ORAL at 18:01

## 2017-06-13 RX ADMIN — MAGNESIUM SULFATE IN WATER 4 G: 40 INJECTION, SOLUTION INTRAVENOUS at 17:43

## 2017-06-13 RX ADMIN — LISINOPRIL 5 MG: 5 TABLET ORAL at 22:20

## 2017-06-13 RX ADMIN — PROPOFOL 150 MG: 10 INJECTION, EMULSION INTRAVENOUS at 08:24

## 2017-06-13 RX ADMIN — PHENYLEPHRINE HYDROCHLORIDE 200 MCG: 10 INJECTION, SOLUTION INTRAMUSCULAR; INTRAVENOUS; SUBCUTANEOUS at 08:21

## 2017-06-13 RX ADMIN — DEXAMETHASONE SODIUM PHOSPHATE 4 MG: 4 INJECTION, SOLUTION INTRA-ARTICULAR; INTRALESIONAL; INTRAMUSCULAR; INTRAVENOUS; SOFT TISSUE at 08:41

## 2017-06-13 RX ADMIN — PHENAZOPYRIDINE HYDROCHLORIDE 200 MG: 200 TABLET, FILM COATED ORAL at 06:20

## 2017-06-13 RX ADMIN — LIDOCAINE HYDROCHLORIDE 40 MG: 20 INJECTION, SOLUTION INFILTRATION; PERINEURAL at 08:24

## 2017-06-13 RX ADMIN — PHENYLEPHRINE HYDROCHLORIDE 100 MCG: 10 INJECTION, SOLUTION INTRAMUSCULAR; INTRAVENOUS; SUBCUTANEOUS at 08:56

## 2017-06-13 RX ADMIN — ONDANSETRON 4 MG: 2 INJECTION INTRAMUSCULAR; INTRAVENOUS at 09:38

## 2017-06-13 RX ADMIN — PHENYLEPHRINE HYDROCHLORIDE 100 MCG: 10 INJECTION, SOLUTION INTRAMUSCULAR; INTRAVENOUS; SUBCUTANEOUS at 09:38

## 2017-06-13 RX ADMIN — OXYCODONE HYDROCHLORIDE 5 MG: 5 TABLET ORAL at 22:21

## 2017-06-13 ASSESSMENT — PAIN DESCRIPTION - DESCRIPTORS: DESCRIPTORS: SORE;DISCOMFORT

## 2017-06-13 NOTE — OR NURSING
Patient tolerated epidural placement with minimal discomfort and no immediate complications. Denies numbness or tingling or metalic taste in her mouth. Will continue to monitor until transferred to OR.

## 2017-06-13 NOTE — PROGRESS NOTES
A radiation therapy treatment planning simulation was performed.  Please see the [employer] record for documentation.    Nicole Ward MD  Radiation Oncology

## 2017-06-13 NOTE — DISCHARGE SUMMARY
Gynecologic Oncology Discharge Summary    Savannah Camacho  [id number]    Admit Date: 6/13/2017  Discharge Date: 6/15/2017   Admitting Provider: Dr Raines  Discharge Provider: Dr. 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 Epidural placement and removal. HDR. Interstitial needle removal.    Prior to Admission Medications:  Prescriptions Prior to Admission   Medication Sig Dispense Refill Last Dose     loperamide (IMODIUM) 2 MG capsule Take 2 mg by mouth 4 times daily as needed for diarrhea   6/12/2017 at 0800     hydrochlorothiazide (HYDRODIURIL) 25 MG tablet daily On hold for now 5/17/17, Reported on 5/16/2017   Past Month at Unknown time     lisinopril (PRINIVIL/ZESTRIL) 5 MG tablet Take 5 mg by mouth 2 times daily Reported on 5/5/2017 6/12/2017 at 2000     LORazepam (ATIVAN) 1 MG tablet Take 1 tablet (1 mg) by mouth every 6 hours as needed (nausea/vomiting, anxiety or sleep ) 30 tablet 1 Past Week at Unknown time     prochlorperazine (COMPAZINE) 10 MG tablet Take 1 tablet (10 mg) by mouth every 6 hours as needed (nausea/vomiting) 30 tablet 2 Past Week at Unknown time     Montelukast Sodium (SINGULAIR PO) Take 10 mg by mouth every evening Reported on 5/16/2017   Past Week at Unknown time     SIMVASTATIN PO Take 40 mg by mouth once On hold 5/17/17 per Gyn onc   Reported on 5/16/2017   Past Month at Unknown time     Carvedilol (COREG PO) Take 6.25 mg by mouth 2 times daily   6/12/2017 at 2000     albuterol (PROAIR HFA/PROVENTIL HFA/VENTOLIN HFA) 108 (90 BASE) MCG/ACT Inhaler Inhale 2 puffs into the lungs every 6 hours as needed Reported on 4/20/2017   Past Month at Unknown time     Multiple Vitamins-Minerals (MULTIVITAMIN ADULT PO) Take by mouth every morning    Past Week at Unknown time     ASPIRIN PO Take 81 mg by mouth daily (with dinner)    6/6/2017       Discharge Medications:   Savannah Camacho   Home Medication Instructions CRISTOPHER:[id number]    Printed on:06/15/17 9836   Medication Information                      acetaminophen (TYLENOL) 325 MG tablet  Take 2 tablets (650 mg) by mouth every 4 hours as needed for mild pain or fever             albuterol (PROAIR HFA/PROVENTIL HFA/VENTOLIN HFA) 108 (90 BASE) MCG/ACT Inhaler  Inhale 2 puffs into the lungs every 6 hours as needed Reported on 4/20/2017             ASPIRIN PO  Take 81 mg by mouth daily (with dinner)              Carvedilol (COREG PO)  Take 6.25 mg by mouth 2 times daily             hydrochlorothiazide (HYDRODIURIL) 25 MG tablet  daily On hold for now 5/17/17, Reported on 5/16/2017             lisinopril (PRINIVIL/ZESTRIL) 5 MG tablet  Take 5 mg by mouth 2 times daily Reported on 5/5/2017             loperamide (IMODIUM) 2 MG capsule  Take 2 mg by mouth 4 times daily as needed for diarrhea             LORazepam (ATIVAN) 1 MG tablet  Take 1 tablet (1 mg) by mouth every 6 hours as needed (nausea/vomiting, anxiety or sleep )             Montelukast Sodium (SINGULAIR PO)  Take 10 mg by mouth every evening Reported on 5/16/2017             Multiple Vitamins-Minerals (MULTIVITAMIN ADULT PO)  Take by mouth every morning              prochlorperazine (COMPAZINE) 10 MG tablet  Take 1 tablet (10 mg) by mouth every 6 hours as needed (nausea/vomiting)             SIMVASTATIN PO  Take 40 mg by mouth once On hold 5/17/17  per Gyn onc  Reported on 5/16/2017                 Consultations:  Radiation Oncology    Brief History of Illness:  Savannah Camacho is a 77 year old woman with a new diagnosis of clinical stage IIA2, 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 Patient has completed EBRT with concominant cisplatin. Planned admission for placement of interstitial needles for HDR.    Hospital Course:  Dz:    - Clinical stage IIA2, 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 Admission for placement of interstitial needles for HDR. She will follow-up for a care plan.  FEN:   - She was made NPO prior to procedures. Otherwise, she was tolerating a regular diet without nausea and vomiting and able to maintain her hydration without IVF supplementation.  Pain:   - Her pain was initially controlled on an bupivacaine epidural and PRN oxycodone. Epidural and oxycodone discontinued once interstitial needles were removed. Her pain was well controlled at time of discharge with tylenol.  CV:   - She has a history of CHF, HLD, and MI. Continued on home meds except for ASA. She will resume all home meds at discharge.  Her vital signs were stable while in house and she had no acute CV issues.  PULM:  - She has a history of asthma. Continue home meds.  She was initially given O2 supplemened off of this without difficulty.  By discharge, her O2 sats were greater than 90% on RA.  She was encouraged to use her bedside IS while in house.  She had no acute pulmonary issues while in house.  HEME:   - Her preoperative Hgb was 9.2. Plt 149>71 and was stable at 73 at time of discharge. Patient has some mild vaginal bleeding post needle placement. Her hgb dropped to 6.8 postoperatively and she received 1 unit PRBC was stable at 8.1 at the time of discharge.  She had no other acute heme issues while in house.  GI:   - She was made NPO prior to procedures.  Otherwise her diet was advanced slowly as tolerated.  At the time of  discharge, she was tolerating a regular diet without nausea and vomiting.  She will be discharged with a bowel regimen to prevent constipation in the postoperative period.  She had no acute GI issues while in house.  :    -  A burk catheter was placed at the time of the surgery.  Once ambulating after needle and epidural removal, the burk catheter was removed.  Prior to discharge, the patient was voiding spontaneously without difficulty.  She had no acute  issues while in house.  ID:   - Preop WBC 1.6 and ANC 0.9. WBC rechecked 4.7>5.1. The patient was AF during her hospitalization.  ENDO:   - no acute issues  PSYCH/NEURO:   - no acute issues  PPX:    - She was given SCDs, IS during her hospital course.     Discharge Instructions and Follow up:  Ms. Savannah Camacho was discharged from the hospital with follow up for     Discharge Diet: Regular  Discharge Activity: Activity as tolerated  Discharge Follow up: 7/28/17 Dr Lee. Follow up with ENT d/t previous recommendations per medicine during last admission.     Discharge Disposition:  Discharged to home    Discharge Staff: Dr Yanna Moreno PGY3  6/15/2017 7:33 PM       Janet Raines MD  Gynecologic Oncology  [facility]

## 2017-06-13 NOTE — OP NOTE
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  POSTOPERATIVE DIAGNOSIS:   IIB squamous cell carcinoma of the cervix.     PROCEDURE PERFORMED:    1.  Exam under anesthesia.    2.  Placement of Keegan applicator with insertion of interstitial needles for HDR brachytherapy, under ultrasound guidance.      SURGEONS:Nicole Ward, Radiation Oncology      ASSISTANTS: Bobby Sanchez    ANESTHESIA:  General, endotracheal tube.        COMPLICATIONS:  None.        ESTIMATED BLOOD LOSS:  5 cc      INTRAVENOUS FLUIDS: 900 cc Lactated Ringer's.        OPERATIVE FINDINGS:  Normal external genitalia with stenotic cervix. There is no palpable tumor.       DESCRIPTION OF OPERATIVE PROCEDURE: Ms. Camacho was brought back to the operating room wearing Pneumoboots and identified to be herself.  The patient was placed under general anesthesia via endotracheal tube.  She was placed on the dorsal lithotomy position.  A time out was performed. A Sommers catheter was inserted into the bladder and 10cc of saline used to inflate the balloon.  Measurements were taken with the vaginal cylinder, so that tumor could be easily identified for treatment.  The Keegan applicator was placed and 1 tandem and 21 interstitial needles were inserted around the vaginal vault under ultrasound guidance.   The Keegan applicator was secured in place with 4 sutures.  DuoDERM padding was used to protect the patient's thighs from the Keegan applicator.  Anesthesia was then reversed and the patient taken to the PACU in stable condition.      Bobby Troy MD  Resident, PGY-2  Department of Radiation Oncology  [facility]  [phone]    ADDENDUM: I was present with the resident during all critical portions of the operation, and immediately available for final wake-up.  I have edited Dr. Troy' note to describe the operative findings and flow.     Nicole Ward MD  Radiation Oncology  [phone]

## 2017-06-13 NOTE — OR NURSING
Pt arrived to PACU with epidural catheter in place.  Pt was bolused in OR with 10cc of bupivicaine, and denies pain on arrival to PACU.  Pt started at 8cc per hour of bupivicaine and is tolerating well.  VS remained stable, no signs or symptoms of lidocaine toxicity.  Pt able to move all extremities, does state that her legs feel a bit more heavy than usual.  Pt states pain is well controlled at this time.

## 2017-06-13 NOTE — IP AVS SNAPSHOT
MRN:[id number]                      After Visit Summary   6/13/2017    Savannah Camacho    MRN: [id number]           Thank you!     Thank you for choosing [facility] for your care. Our goal is always to provide you with excellent care. Hearing back from our patients is one way we can continue to improve our services. Please take a few minutes to complete the written survey that you may receive in the mail after you visit with us. Thank you!        Patient Information     Date Of Birth          1940        Designated Caregiver       Most Recent Value    Caregiver    Will someone help with your care after discharge? yes    Name of designated caregiver Reynold    Phone number of caregiver [phone]    [place]      About your hospital stay     You were admitted on:  June 13, 2017 You last received care in the:  [address] [facility]    You were discharged on:  Hiwot 15, 2017        Reason for your hospital stay       Radiation                  Who to Call     For medical emergencies, please call 911.  For non-urgent questions about your medical care, please call your primary care provider or clinic, [phone]  For questions related to your surgery, please call your surgery clinic        Attending Provider     Provider Specialty    Nicole Ward MD Radiation Oncology    Janet Magallanes MD Gyn-Onc       Primary Care Provider Office Phone # Fax #    Adán Lanier [phone]       When to contact your care team       GENERAL POST-OPERATIVE  PATIENT INSTRUCTIONS  FOLLOW-UP:    Call Surgeon if you have:  Temperature greater than 100.4  Persistent nausea and vomiting  Severe uncontrolled pain  Difficulty breathing, headache or visual disturbances  Hives  Persistent dizziness or light-headedness  Extreme fatigue  Any other questions or concerns you may have after discharge    In an emergency, call 911 or go to an Emergency Department  at a nearby hospital       WOUND CARE INSTRUCTIONS:  Keep a dry clean dressing on the wound if there is drainage. If you had a bandage initially, it may be removed after 24 hours.  Once the wound has quit draining you may leave it open to air.  If clothing rubs against the wound or causes irritation and the wound is not draining you may cover it with a dry dressing during the daytime.  Try to keep the wound dry and avoid ointments on the wound unless directed to do so.  If the wound becomes bright red and painful or starts to drain infected material that is not clear, please contact your physician immediately.    1.  You may shower 24 hrs after surgery   2.  No soaking in the tub for 4 weeks       DIET:  There are no dietary restrictions.  You may eat any foods that you can tolerate unless instructed otherwise.  It is a good idea to eat a high fiber diet and take in plenty of fluids to prevent constipation.  If you become constipated, please follow the instructions below.    ACTIVITY:  You are encouraged to cough, deep breath and use your incentive spirometer if you were given one, every 15-30 minutes when awake.  This will help prevent respiratory complications and low grade fevers post-operatively.  You may want to hug a pillow when coughing and sneezing to add additional support to the surgical area, if you had abdominal surgery, which will decrease pain during these times.      1.  No heavy lifting >20lbs or strenuous exercise for six-eight weeks.  No exercise in which you are using core muscles (yoga, pilates, swimming, weight lifting)  2.  You may walk as much as you wish.  You are encouraged to increase your activity each day after surgery.  Stairs are okay.   3.  Nothing per vagina for eight weeks.  No tampons, no intercourse, no douching.  You can expect some light vaginal spotting and discharge for up to six weeks.  If bleeding becomes heavy, please contact the office.     MEDICATIONS:  Try to take  narcotic medications and anti-inflammatory medications, such as tylenol, ibuprofen, naprosyn, etc., with food.  This will minimize stomach upset from the medication.  Should you develop nausea and vomiting from the pain medication, or develop a rash, please discontinue the medication and contact your physician.  You should not drive, make important decisions, or operate machinery when taking narcotic pain medication.    OTHER:  Patients are often constipated after general anesthesia and surgery.  The patient should continue to take stool softeners (for example, Senokot-S) for the next six weeks (unless diarrhea develops) and consume adequate amounts of water.  If the patient remains constipated or unable to pass stool, please try one or all of the following measures:  1.  Milk of Magnesia 30cc twice a day as needed by mouth  2.  Metamucil 2 tablespoons in 12 ounces of fluid  3.  Dulcolax oral or suppositories  4.  Prunes or prune juice  5.  Miralax daily      QUESTIONS:  Please feel free to call your physician or the hospital  if you have any questions, and they will be glad to assist you.                  After Care Instructions     Activity       Your activity upon discharge: activity as tolerated            Diet       Follow this diet upon discharge: Regular                  Follow-up Appointments     Adult [facility] Follow-up and recommended labs and tests       Follow up with Dr Lee 7/28/17. No need for imaging prior to seeing Dr Lee. Call prior to then for questions or concerns [phone].     Follow up with ENT 6/29/17. Please call [phone] to schedule an appt. Please bring all of your records with you to this appt.     Appointments on [place] and/or [facility] (with [facility] or [facility] provider or service). Call [phone] if you haven't heard regarding these appointments within 7 days of discharge.                  Your next 10 appointments already scheduled     Jun 29, 2017  3:00  "PM CDT   (Arrive by 2:45 PM)   New Patient Visit with Jose Eduardo José MD   [facility] ([facility])    [address]  4th Floor  [facility] [id number] [phone]            Jul 28, 2017  9:00 AM CDT   (Arrive by 8:45 AM)   Return Visit with Kalee Lee MD   [facility] ([facility])    [address]  2nd Floor  [facility] [id number] [phone]              Additional Services     ENT IP Consult       ENT outpatient consult for vocal cord nodule                  Pending Results     No orders found from 6/11/2017 to 6/14/2017.            Statement of Approval     Ordered          06/15/17 1929  I have reviewed and agree with all the recommendations and orders detailed in this document.  EFFECTIVE NOW     Approved and electronically signed by:  Janet Magallanes MD             Admission Information     Date & Time Provider Department Dept. Phone    6/13/2017 Janet Magallanes MD [address] [facility] [phone]      Your Vitals Were     Blood Pressure Pulse Temperature Respirations Height Weight    134/69 (BP Location: Right arm) 73 96  F (35.6  C) (Oral) 16 1.651 m (5' 5\") 63.7 kg (140 lb 6.9 oz)    Pulse Oximetry BMI (Body Mass Index)                95% 23.37 kg/m2          MyChart Information     [employer] lets you send messages to your doctor, view your test results, renew your prescriptions, schedule appointments and more. To sign up, go to [email or web address] . Click on \"Log in\" on the left side of the screen, which will take you to the Welcome page. Then click on \"Sign up Now\" on the right side of the page.     You will be asked to enter the access code listed below, as well as some personal information. Please follow the directions to create your username and password.     Your access code is: [email or web address]  Expires: 7/18/2017 11:20 AM     Your access code will "  in 90 days. If you need help or a new code, please call your [facility] clinic or [phone].        Care EveryWhere ID     This is your Care EveryWhere ID. This could be used by other organizations to access your [facility] medical records  [id number]           Review of your medicines      START taking        Dose / Directions    acetaminophen 325 MG tablet   Commonly known as:  TYLENOL   Used for:  Malignant neoplasm of exocervix (H)        Dose:  650 mg   Take 2 tablets (650 mg) by mouth every 4 hours as needed for mild pain or fever   Quantity:  100 tablet   Refills:  0         CONTINUE these medicines which have NOT CHANGED        Dose / Directions    albuterol 108 (90 BASE) MCG/ACT Inhaler   Commonly known as:  PROAIR HFA/PROVENTIL HFA/VENTOLIN HFA        Dose:  2 puff   Inhale 2 puffs into the lungs every 6 hours as needed Reported on 2017   Refills:  0       ASPIRIN PO        Dose:  81 mg   Take 81 mg by mouth daily (with dinner)   Refills:  0       COREG PO        Dose:  6.25 mg   Take 6.25 mg by mouth 2 times daily   Refills:  0       hydrochlorothiazide 25 MG tablet   Commonly known as:  HYDRODIURIL        daily On hold for now 17, Reported on 2017   Refills:  0       lisinopril 5 MG tablet   Commonly known as:  PRINIVIL/ZESTRIL        Dose:  5 mg   Take 5 mg by mouth 2 times daily Reported on 2017   Refills:  0       loperamide 2 MG capsule   Commonly known as:  IMODIUM        Dose:  2 mg   Take 2 mg by mouth 4 times daily as needed for diarrhea   Refills:  0       LORazepam 1 MG tablet   Commonly known as:  ATIVAN   Used for:  Malignant neoplasm of exocervix (H)        Dose:  1 mg   Take 1 tablet (1 mg) by mouth every 6 hours as needed (nausea/vomiting, anxiety or sleep )   Quantity:  30 tablet   Refills:  1       MULTIVITAMIN ADULT PO        Take by mouth every morning   Refills:  0       prochlorperazine 10 MG tablet   Commonly known as:  COMPAZINE   Used for:  Malignant  neoplasm of exocervix (H)        Dose:  10 mg   Take 1 tablet (10 mg) by mouth every 6 hours as needed (nausea/vomiting)   Quantity:  30 tablet   Refills:  2       SIMVASTATIN PO        Dose:  40 mg   Take 40 mg by mouth once On hold 5/17/17 per Gyn onc  Reported on 5/16/2017   Refills:  0       SINGULAIR PO        Dose:  10 mg   Take 10 mg by mouth every evening Reported on 5/16/2017   Refills:  0            Where to get your medicines      Some of these will need a paper prescription and others can be bought over the counter. Ask your nurse if you have questions.     You don't need a prescription for these medications     acetaminophen 325 MG tablet                Protect others around you: Learn how to safely use, store and throw away your medicines at www.disposemymeds.org.             Medication List: This is a list of all your medications and when to take them. Check marks below indicate your daily home schedule. Keep this list as a reference.      Medications           Morning Afternoon Evening Bedtime As Needed    acetaminophen 325 MG tablet   Commonly known as:  TYLENOL   Take 2 tablets (650 mg) by mouth every 4 hours as needed for mild pain or fever   Last time this was given:  650 mg on 6/15/2017  6:20 AM                                albuterol 108 (90 BASE) MCG/ACT Inhaler   Commonly known as:  PROAIR HFA/PROVENTIL HFA/VENTOLIN HFA   Inhale 2 puffs into the lungs every 6 hours as needed Reported on 4/20/2017                                ASPIRIN PO   Take 81 mg by mouth daily (with dinner)                                COREG PO   Take 6.25 mg by mouth 2 times daily   Last time this was given:  6.25 mg on 6/15/2017  7:54 AM                                hydrochlorothiazide 25 MG tablet   Commonly known as:  HYDRODIURIL   daily On hold for now 5/17/17, Reported on 5/16/2017                                lisinopril 5 MG tablet   Commonly known as:  PRINIVIL/ZESTRIL   Take 5 mg by mouth 2 times daily  Reported on 5/5/2017   Last time this was given:  5 mg on 6/15/2017  7:54 AM                                loperamide 2 MG capsule   Commonly known as:  IMODIUM   Take 2 mg by mouth 4 times daily as needed for diarrhea                                LORazepam 1 MG tablet   Commonly known as:  ATIVAN   Take 1 tablet (1 mg) by mouth every 6 hours as needed (nausea/vomiting, anxiety or sleep )                                MULTIVITAMIN ADULT PO   Take by mouth every morning                                prochlorperazine 10 MG tablet   Commonly known as:  COMPAZINE   Take 1 tablet (10 mg) by mouth every 6 hours as needed (nausea/vomiting)                                SIMVASTATIN PO   Take 40 mg by mouth once On hold 5/17/17 per Gyn onc  Reported on 5/16/2017                                SINGULAIR PO   Take 10 mg by mouth every evening Reported on 5/16/2017   Last time this was given:  10 mg on 6/14/2017  8:37 PM

## 2017-06-13 NOTE — ANESTHESIA CARE TRANSFER NOTE
Patient: Savannah Camacho    Procedure(s):  Insertion Interstitial Needle with Ultrasound Guidance Anesthesia Block - Wound Class: II-Clean Contaminated    Diagnosis: Cervical Cancer   Diagnosis Additional Information: No value filed.    Anesthesia Type:   General, Periph. Nerve Block for postop pain     Note:  Airway :Face Mask  Patient transferred to:PACU        Vitals: (Last set prior to Anesthesia Care Transfer)    CRNA VITALS  6/13/2017 0932 - 6/13/2017 1007      6/13/2017             SpO2: 100 %    Resp Rate (set): 10                Electronically Signed By: CASPER Boone CRNA  June 13, 2017  10:07 AM

## 2017-06-13 NOTE — PLAN OF CARE
Problem: Goal Outcome Summary  Goal: Goal Outcome Summary  Outcome: No Change  Pt received from radiation therapy s/p interstitial needle placement.VSS upon arrival,oriented to room.Tolerating clear liquids without c/o nausea.States she isn't hungry for anything more at the present time.Sommers with adequate UV.Needles intact with large amount of bloody drainage present.Lung sounds clear,using IS with encouragement.To receive next treatment around 3 pm.Family present at bedside.Epidural set at 8cc/hour.No LAST symptoms noted.States pain is well controlled at present time.

## 2017-06-13 NOTE — ANESTHESIA PROCEDURE NOTES
Epidural Procedure Note    Staff:     Anesthesiologist:  NAILA ROBERT  Location: Pre-op     Procedure start time:  6/13/2017 7:15 AM   Pre-procedure checklist:   patient identified, IV checked, site marked, risks and benefits discussed, informed consent, monitors and equipment checked, pre-op evaluation, at physician/surgeon's request and post-op pain management      Correct Patient: Yes      Correct Position: Yes      Correct Site: Yes      Correct Procedure: Yes      Correct Laterality:  Yes    Site Marked:  Yes  Procedure:     Procedure:  Epidural catheter    ASA:  3    Diagnosis:  Radiation treatment    Position:  Sitting    Sterile Prep: chloraprep      Insertion site:  L2-3    Local skin infiltration:  1% lidocaine    Approach:  Midline    Needle gauge (G):  17    Block Needle Type:  Touhy    Injection Technique:  LORT saline    BARB at (cm):  4.5    Attempts:  1    Redirects:  0    Catheter gauge (G):  19    Threaded to cm at skin:  7.5    Threaded in epidural space (cm):  3    Paresthesias:  No    Aspiration negative for Heme or CSF: Yes      Test dose (mL):  3     Local anesthetic:  Lidocaine 1.5% w/ 1:200,000 epinephrine    Test dose time:  07:20    Test dose negative for signs of intravascular, subdural or intrathecal injection: Yes

## 2017-06-13 NOTE — IP AVS SNAPSHOT
[address] [address]    Phone:  [phone]                                       After Visit Summary   6/13/2017    Savannah Camacho    MRN: [id number]           After Visit Summary Signature Page     I have received my discharge instructions, and my questions have been answered. I have discussed any challenges I see with this plan with the nurse or doctor.    ..........................................................................................................................................  Patient/Patient Representative Signature      ..........................................................................................................................................  Patient Representative Print Name and Relationship to Patient    ..................................................               ................................................  Date                                            Time    ..........................................................................................................................................  Reviewed by Signature/Title    ...................................................              ..............................................  Date                                                            Time

## 2017-06-13 NOTE — MR AVS SNAPSHOT
After Visit Summary   6/13/2017    Savannah Camacho    MRN: [id number]           Patient Information     Date Of Birth          1940        Visit Information        Provider Department      6/13/2017 3:00 PM Nicole Ward MD [facility]        Today's Diagnoses     Malignant neoplasm of exocervix (H)    -  1       Follow-ups after your visit        Your next 10 appointments already scheduled     Jun 29, 2017  3:00 PM CDT   (Arrive by 2:45 PM)   New Patient Visit with Jose Eduardo José MD   [facility] ([facility])    [address] [place] [address]  4th [facility] [address] [phone]            Jul 28, 2017  9:00 AM CDT   (Arrive by 8:45 AM)   Return Visit with Kalee Lee MD   [facility] ([facility])    [address] [place] [address]  2nd [facility] [address] [phone]              Who to contact     Please call your clinic at [phone] to:    Ask questions about your health    Make or cancel appointments    Discuss your medicines    Learn about your test results    Speak to your doctor   If you have compliments or concerns about an experience at your clinic, or if you wish to file a complaint, please contact [facility] Patient Relations at [phone] or email us at [email or web address]         Additional Information About Your Visit        MyChart Information     studdext is an electronic gateway that provides easy, online access to your medical records. With [employer], you can request a clinic appointment, read your test results, renew a prescription or communicate with your care team.     To sign up for studdext visit the website at [email or web address]   You will be asked to enter the access code listed below, as well as some personal information. Please follow the directions to create your username and  password.     Your access code is: [email or web address]  Expires: 2017 11:20 AM     Your access code will  in 90 days. If you need help or a new code, please contact your [facility] or call [phone] for assistance.        Care EveryWhere ID     This is your Care EveryWhere ID. This could be used by other organizations to access your [place] medical records  [id number]         Blood Pressure from Last 3 Encounters:   06/15/17 134/69   17 104/62   17 120/66    Weight from Last 3 Encounters:   17 63.7 kg (140 lb 6.9 oz)   17 64.5 kg (142 lb 1.6 oz)   17 67 kg (147 lb 11.2 oz)              Today, you had the following     No orders found for display         Today's Medication Changes      Notice     This visit is during an admission. Changes to the med list made in this visit will be reflected in the After Visit Summary of the admission.             Primary Care Provider Office Phone # Fax #    Adán Lanier [phone]       [address] [place] [facility] [address]        Thank you!     Thank you for choosing [facility]  for your care. Our goal is always to provide you with excellent care. Hearing back from our patients is one way we can continue to improve our services. Please take a few minutes to complete the written survey that you may receive in the mail after your visit with us. Thank you!             Your Updated Medication List - Protect others around you: Learn how to safely use, store and throw away your medicines at www.disposemymeds.org.      Notice     This visit is during an admission. Changes to the med list made in this visit will be reflected in the After Visit Summary of the admission.

## 2017-06-13 NOTE — PROGRESS NOTES
Gynecologic Oncology Postoperative Check Note  6/13/2017    S: Savannah feeling well after needle placement.  She is lying in bed eating a popsicle, feels no pain, no N/V and minimal vaginal bleeding.  She has no complaints.    O:  Vitals:    06/13/17 1100 06/13/17 1115 06/13/17 1130 06/13/17 1352   BP: 108/61 110/59 122/68 123/66   BP Location:    Right arm   Pulse:    79   Resp: 16 16 16 16   Temp:    97.4  F (36.3  C)   TempSrc:    Oral   SpO2: 100% 100% 100% 95%   Weight:       Height:           Gen: Appears well, lying in bed  Cardio: No murmurs on auscultation  Resp: Normal lung sounds on auscultation anteriorly  Abdomen: No abdominal pain  Extremities: SCDs in place    A: 77 year old POD#0 s/p Interstitial needle placement for HDR.    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 S/P EBRT with cisplatin. Admit placement of interstitial needles for HDR.  FEN: ERP. BMP and mag pending. Hx of hyponautremia and hypmag. Regular diet   Pain: Bupivacaine epidural and PRN oxycodone and tylenol  CV: History of CHF, HLD, and MI. Continued on home meds coreg and lisinopril Hold ASA and HCTZ.   Pulmonary: History of asthma. Continue home meds.    HEME: Hgb 9.2. Plt 149. Lovenox 6/14/17   GI: Regular diet. PRN antidiarrheal  : Sommers catheter to remain in place with interstitial needles. Removal on Thursday.  ID: Preop WBC 1.6 and ANC 0.9. Afebrile at present. Monitor for fevers.   PPX: SCDs, IS, and lovenox POD#1.     Julissa Redman, CNP  6/13/2017 2:33 PM

## 2017-06-13 NOTE — ANESTHESIA POSTPROCEDURE EVALUATION
Patient: Savannah Camacho    Procedure(s):  Insertion Interstitial Needle with Ultrasound Guidance Anesthesia Block - Wound Class: II-Clean Contaminated    Diagnosis:Cervical Cancer   Diagnosis Additional Information: No value filed.    Anesthesia Type:  General, Periph. Nerve Block for postop pain    Note:  Anesthesia Post Evaluation    Patient location during evaluation: PACU  Patient participation: Able to fully participate in evaluation  Level of consciousness: sleepy but conscious  Pain management: adequate  Airway patency: patent  Cardiovascular status: acceptable  Respiratory status: acceptable  Hydration status: acceptable  PONV: controlled     Anesthetic complications: None    Comments: Patient awake to voice, clear a/w.  Stable VS, and now comfortable..  No new or current issues evident at this time.  OK out per PACU protocol.  --rcp          Last vitals:  Vitals:    06/13/17 0735 06/13/17 0745 06/13/17 1010   BP:  115/61 114/61   Pulse:      Resp: 16 14 16   Temp:   36.5  C (97.7  F)   SpO2: 99% 99% 100%         Electronically Signed By: Adalid Posey MD  June 13, 2017  10:27 AM

## 2017-06-13 NOTE — PROGRESS NOTES
Called to patient's room to evaluate right foot tingling. Patient states that the tingling has increased in the past hour or so, covering much of her foot. Also having some tingling in left foot but less compared with right. States has had similar sensation in the past but gets better with movement, unable to walk/move now given interstitial needles. Reports normal sensation with light touch and 5/5 strength with dorsi and plantarflexion bilaterally. Notes that with her chemotherapy has had these feelings prior as well. Do feel that she likely has a component of neuropathy. If worsening, would have anesthesia come to evaluate epidural. Could also consider gabapentin.     Indira Moreno PGY2  6/13/2017 6:59 PM

## 2017-06-13 NOTE — MR AVS SNAPSHOT
After Visit Summary   6/13/2017    Savannah Camacho    MRN: [id number]           Patient Information     Date Of Birth          1940        Visit Information        Provider Department      6/13/2017 11:00 AM Nicole Ward MD Radiation Oncology Clinic        Today's Diagnoses     Malignant neoplasm of exocervix (H)    -  1       Follow-ups after your visit        Your next 10 appointments already scheduled     Jun 13, 2017  3:00 PM CDT   TCT/SIM Suite Visit with Nicole Ward MD   [facility] ([facility])    [facility]  1st Floor  [address] [facility] [address]   [phone]            Jun 14, 2017  8:00 AM CDT   TCT/SIM Suite Visit with Michelle Carrion MD   [facility]cology Clinic ([facility])    [facility]  1st Floor  [address] [address]   [phone]            Jun 14, 2017  2:30 PM CDT   TCT/SIM Suite Visit with Michelle Carrion MD   [facility] ([facility])    [facility]  1st Floor  [address] [address]   [phone]            Tom 15, 2017  8:00 AM CDT   TCT/SIM Suite Visit with Michelle Carrion MD   [facility] ([facility])    [facility]  1st Floor  [address] [address]   [phone]            Tom 15, 2017  2:30 PM CDT   TCT/SIM Suite Visit with Michelle Carrion MD   [facility] ([facility])    [facility]  1st Floor  [address] [address]   [phone]            Tom 15, 2017  3:55 PM CDT   TCT/SIM Suite Visit with Michelle Carrion MD   [facility] ([facility])    [facility]  1st Floor  [address] [address]   [phone]            Tom 15, 2017   Procedure with Michelle Carrion  MD   [facility] (--)    [address]  [place] [address]   [phone]            2017  9:00 AM CDT   (Arrive by 8:45 AM)   Return Visit with Kalee Lee MD   [facility] ([facility])    [address] [facility]  2nd Floor  [facility] [address] [phone]              Who to contact     Please call your clinic at [phone] to:    Ask questions about your health    Make or cancel appointments    Discuss your medicines    Learn about your test results    Speak to your doctor   If you have compliments or concerns about an experience at your clinic, or if you wish to file a complaint, please contact [facility] Patient Relations at [phone] or email us at [email or web address]         Additional Information About Your Visit        [employer] Information     Tangoe is an electronic gateway that provides easy, online access to your medical records. With [employer], you can request a clinic appointment, read your test results, renew a prescription or communicate with your care team.     To sign up for Tangoe visit the website at [email or web address]   You will be asked to enter the access code listed below, as well as some personal information. Please follow the directions to create your username and password.     Your access code is: [email or web address]  Expires: 2017 11:20 AM     Your access code will  in 90 days. If you need help or a new code, please contact your [facility] or call [phone] for assistance.        Care EveryWhere ID     This is your Care EveryWhere ID. This could be used by other organizations to access your [place] medical records  [id number]         Blood Pressure from Last 3 Encounters:   17 131/66   17 104/62   17 120/66    Weight from Last 3 Encounters:   17 63.7 kg (140 lb 6.9 oz)   17 64.5 kg  (142 lb 1.6 oz)   06/01/17 67 kg (147 lb 11.2 oz)              Today, you had the following     No orders found for display         Today's Medication Changes      Notice     This visit is during an admission. Changes to the med list made in this visit will be reflected in the After Visit Summary of the admission.             Primary Care Provider Office Phone # Fax #    Adán Lanier [phone]       [address] [place] [facility] [address]        Thank you!     Thank you for choosing [facility]  for your care. Our goal is always to provide you with excellent care. Hearing back from our patients is one way we can continue to improve our services. Please take a few minutes to complete the written survey that you may receive in the mail after your visit with us. Thank you!             Your Updated Medication List - Protect others around you: Learn how to safely use, store and throw away your medicines at www.disposemymeds.org.      Notice     This visit is during an admission. Changes to the med list made in this visit will be reflected in the After Visit Summary of the admission.

## 2017-06-14 ENCOUNTER — ALLIED HEALTH/NURSE VISIT (OUTPATIENT)
Dept: RADIATION ONCOLOGY | Facility: CLINIC | Age: 77
End: 2017-06-14
Attending: RADIOLOGY
Payer: MEDICARE

## 2017-06-14 DIAGNOSIS — C53.1 MALIGNANT NEOPLASM OF EXOCERVIX (H): Primary | ICD-10-CM

## 2017-06-14 LAB
ABO + RH BLD: NORMAL
ABO + RH BLD: NORMAL
BLD GP AB SCN SERPL QL: NORMAL
BLD PROD TYP BPU: NORMAL
BLD PROD TYP BPU: NORMAL
BLD UNIT ID BPU: 0
BLOOD BANK CMNT PATIENT-IMP: NORMAL
BLOOD PRODUCT CODE: NORMAL
BPU ID: NORMAL
ERYTHROCYTE [DISTWIDTH] IN BLOOD BY AUTOMATED COUNT: 17.5 % (ref 10–15)
HCT VFR BLD AUTO: 19.9 % (ref 35–47)
HGB BLD-MCNC: 6.8 G/DL (ref 11.7–15.7)
HGB BLD-MCNC: 8.3 G/DL (ref 11.7–15.7)
MAGNESIUM SERPL-MCNC: 2.1 MG/DL (ref 1.6–2.3)
MAGNESIUM SERPL-MCNC: 2.5 MG/DL (ref 1.6–2.3)
MCH RBC QN AUTO: 30.5 PG (ref 26.5–33)
MCHC RBC AUTO-ENTMCNC: 34.2 G/DL (ref 31.5–36.5)
MCV RBC AUTO: 89 FL (ref 78–100)
NUM BPU REQUESTED: 1
PLATELET # BLD AUTO: 71 10E9/L (ref 150–450)
RBC # BLD AUTO: 2.23 10E12/L (ref 3.8–5.2)
SPECIMEN EXP DATE BLD: NORMAL
TRANSFUSION STATUS PATIENT QL: NORMAL
TRANSFUSION STATUS PATIENT QL: NORMAL
WBC # BLD AUTO: 4.7 10E9/L (ref 4–11)

## 2017-06-14 PROCEDURE — 77280 THER RAD SIMULAJ FIELD SMPL: CPT | Performed by: RADIOLOGY

## 2017-06-14 PROCEDURE — C1717 BRACHYTX, NON-STR,HDR IR-192: HCPCS | Performed by: RADIOLOGY

## 2017-06-14 PROCEDURE — 25000132 ZZH RX MED GY IP 250 OP 250 PS 637: Mod: GY | Performed by: NURSE PRACTITIONER

## 2017-06-14 PROCEDURE — 83735 ASSAY OF MAGNESIUM: CPT | Performed by: OBSTETRICS & GYNECOLOGY

## 2017-06-14 PROCEDURE — S0020 INJECTION, BUPIVICAINE HYDRO: HCPCS | Performed by: ANESTHESIOLOGY

## 2017-06-14 PROCEDURE — 86923 COMPATIBILITY TEST ELECTRIC: CPT | Performed by: OBSTETRICS & GYNECOLOGY

## 2017-06-14 PROCEDURE — 25000125 ZZHC RX 250: Performed by: ANESTHESIOLOGY

## 2017-06-14 PROCEDURE — 77772 HDR RDNCL NTRSTL/ICAV BRCHTX: CPT | Mod: XE | Performed by: RADIOLOGY

## 2017-06-14 PROCEDURE — 99231 SBSQ HOSP IP/OBS SF/LOW 25: CPT | Mod: GC | Performed by: OBSTETRICS & GYNECOLOGY

## 2017-06-14 PROCEDURE — A9270 NON-COVERED ITEM OR SERVICE: HCPCS | Mod: GY | Performed by: NURSE PRACTITIONER

## 2017-06-14 PROCEDURE — 77772 HDR RDNCL NTRSTL/ICAV BRCHTX: CPT | Performed by: RADIOLOGY

## 2017-06-14 PROCEDURE — 36415 COLL VENOUS BLD VENIPUNCTURE: CPT | Performed by: OBSTETRICS & GYNECOLOGY

## 2017-06-14 PROCEDURE — 86901 BLOOD TYPING SEROLOGIC RH(D): CPT | Performed by: OBSTETRICS & GYNECOLOGY

## 2017-06-14 PROCEDURE — 86850 RBC ANTIBODY SCREEN: CPT | Performed by: OBSTETRICS & GYNECOLOGY

## 2017-06-14 PROCEDURE — 85018 HEMOGLOBIN: CPT | Performed by: STUDENT IN AN ORGANIZED HEALTH CARE EDUCATION/TRAINING PROGRAM

## 2017-06-14 PROCEDURE — 86900 BLOOD TYPING SEROLOGIC ABO: CPT | Performed by: OBSTETRICS & GYNECOLOGY

## 2017-06-14 PROCEDURE — 36415 COLL VENOUS BLD VENIPUNCTURE: CPT | Performed by: STUDENT IN AN ORGANIZED HEALTH CARE EDUCATION/TRAINING PROGRAM

## 2017-06-14 PROCEDURE — 12000001 ZZH R&B MED SURG/OB UMMC

## 2017-06-14 PROCEDURE — 85027 COMPLETE CBC AUTOMATED: CPT | Performed by: OBSTETRICS & GYNECOLOGY

## 2017-06-14 PROCEDURE — P9016 RBC LEUKOCYTES REDUCED: HCPCS | Performed by: OBSTETRICS & GYNECOLOGY

## 2017-06-14 PROCEDURE — 25000128 H RX IP 250 OP 636: Performed by: ANESTHESIOLOGY

## 2017-06-14 RX ORDER — SIMETHICONE 40MG/0.6ML
40 SUSPENSION, DROPS(FINAL DOSAGE FORM)(ML) ORAL EVERY 6 HOURS PRN
Status: DISCONTINUED | OUTPATIENT
Start: 2017-06-14 | End: 2017-06-15 | Stop reason: HOSPADM

## 2017-06-14 RX ADMIN — OXYCODONE HYDROCHLORIDE 5 MG: 5 TABLET ORAL at 14:39

## 2017-06-14 RX ADMIN — LISINOPRIL 5 MG: 5 TABLET ORAL at 20:37

## 2017-06-14 RX ADMIN — THERA TABS 1 TABLET: TAB at 09:21

## 2017-06-14 RX ADMIN — MONTELUKAST SODIUM 10 MG: 10 TABLET, FILM COATED ORAL at 20:37

## 2017-06-14 RX ADMIN — LISINOPRIL 5 MG: 5 TABLET ORAL at 09:21

## 2017-06-14 RX ADMIN — CARVEDILOL 6.25 MG: 3.12 TABLET, FILM COATED ORAL at 20:37

## 2017-06-14 RX ADMIN — CARVEDILOL 6.25 MG: 3.12 TABLET, FILM COATED ORAL at 09:21

## 2017-06-14 RX ADMIN — OXYCODONE HYDROCHLORIDE 5 MG: 5 TABLET ORAL at 07:26

## 2017-06-14 RX ADMIN — ACETAMINOPHEN 650 MG: 325 TABLET, FILM COATED ORAL at 04:34

## 2017-06-14 RX ADMIN — BUPIVACAINE HYDROCHLORIDE: 7.5 INJECTION, SOLUTION EPIDURAL; RETROBULBAR at 12:49

## 2017-06-14 ASSESSMENT — PAIN DESCRIPTION - DESCRIPTORS
DESCRIPTORS: SORE;DISCOMFORT
DESCRIPTORS: SORE;BURNING
DESCRIPTORS: SORE;BURNING
DESCRIPTORS: BURNING;SORE

## 2017-06-14 NOTE — PLAN OF CARE
Problem: Goal Outcome Summary  Goal: Goal Outcome Summary  Outcome: Therapy, progress toward functional goals as expected  On strict bedrest, HOB no greater than 15 degree, pt left 7C this am at 0800H for radiation. Turned and repositioned. Omayra cares done x2 this shift, with moderate serosanguinous drainage. Pain from interstitial needles treated with prn oxycodone and epidural at 8 ml/hr with relief. MIVF at 10 cc/hr. Tolerating regular diet. Blood transfusion completed this am. No reaction noted VSS, afebrile, O2 sats WNL during the blood transfusion. Hgb recheck at 1500H. Sommers with adequate urine volume, cares done. Pt had 1 BM this shift. PLAN: Skin care, omayra-cares, pain management, VS, radiation this afternoon and tomorrow. Possible dc tomorrow if medically stable.

## 2017-06-14 NOTE — PROGRESS NOTES
REGIONAL ANESTHESIA PAIN SERVICE EPIDURAL NOTE  SUBJECTIVE:   Interval History: Pt reports adequate pain control via epidural.  Denies any weakness, paresthesias, circumoral numbness, metallic taste or tinnitus.  Pt has activity restrictions.  Patient is currently without nausea; without pruritis.  Currently tolerating a diet.       Clinically Aligned Pain Assessment (CAPA):  Comfort (How is your pain?): Comfortably manageable  Change in Pain (Since your last medication/intervention?): About the same  Pain Control (How are your pain treatments working?):  Partially effective pain control  Functioning (Are you able to do activities to get better?) : Can't do much do to activity restrictions  Sleep (Does your pain management allow you to sleep or rest?): Awake with occasional pain     Numerical Rating Scale:    Reports pain 2/10 at rest and 7/10 with movement.          Anticoagulation:  none      OBJECTIVE:  Diagnostics:  Lab Results   Component Value Date    WBC 4.7 06/14/2017     Lab Results   Component Value Date    RBC 2.23 06/14/2017     Lab Results   Component Value Date    HGB 6.8 06/14/2017     Lab Results   Component Value Date    HCT 19.9 06/14/2017     Lab Results   Component Value Date    PLT 71 06/14/2017       Lab Results   Component Value Date    INR 0.97 06/01/2017       Vitals:    Temp:  [95.7  F (35.4  C)-98  F (36.7  C)] 95.7  F (35.4  C)  Pulse:  [71-86] 71  Heart Rate:  [74-77] 77  Resp:  [14-25] 25  BP: (107-140)/(54-74) 118/58  SpO2:  [93 %-100 %] 98 %    Exam:    Strength 5/5 and symmetric grossly in bilateral LE      Epidural site with dressing c/d/i, no tenderness, erythema, heme, edema      ASSESSMENT/PLAN:    Savannah Camacho is a 77 year old female catheter day #1 s/p INSERT INTERSTITIAL NEEDLE, ULTRASOUND GUIDED REMOVE RADIATION NEEDLES CERVIX and placement of L2-3 epidural for analgesia.  Pt receiving adequate analgesia with epidural infusion Bupivacaine 0.125% at 8mL/hour.  Pt has  activity restriction in place.  No weakness or paresthesias.  No evidence of adverse side effects related to local anesthetic.      - continue current epidural infusion at 8mL/hour   - will DC epidural catheter 6/15/17 after last brachytherapy in the afternoon  - will continue to follow and adjust as needed  - discussed plan with attending anesthesiologist        CASPER Huffman CNP  [facility]ional Anesthesia Pain Service  6/14/2017 12:33 PM    24 hour Job Code Pager.  For in-house use only.     Dial * * *[phone] and  [place]:  [phone]  [place]: [phone]  Peds:  [phone]  Then enter call-back number  May text page using [employer], but NOT [employer].

## 2017-06-14 NOTE — PROGRESS NOTES
"Angel Copeland, MS3    Gynecology Oncology Progress Note    Ms. Savannah Camacho is a 76 y/o POD #1 s/p Interstitial needle placement for HDR.    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    S/P EBRT with cisplatin.  Has received interstitial needles for HDR yesterday, today and last episode tomorrow.      Subjective:  Patient is feeling well spending time with family in her room.    Objective:  /60  Pulse 73  Temp 98  F (36.7  C)  Resp 25  Ht 1.651 m (5' 5\")  Wt 63.7 kg (140 lb 6.9 oz)  SpO2 97%  BMI 23.37 kg/m2  Lab Results   Component Value Date    WBC 4.7 06/14/2017     Lab Results   Component Value Date    RBC 2.23 06/14/2017     Lab Results   Component Value Date    HGB 8.3 06/14/2017     Lab Results   Component Value Date    HCT 19.9 06/14/2017     No components found for: MCT  Lab Results   Component Value Date    MCV 89 06/14/2017     Lab Results   Component Value Date    MCH 30.5 06/14/2017     Lab Results   Component Value Date    MCHC 34.2 06/14/2017     Lab Results   Component Value Date    RDW 17.5 06/14/2017     Lab Results   Component Value Date    PLT 71 06/14/2017     Gen:  Appears well, lying in bed    HEME: Hgb 9.2>6.8, transfused with 1u pRBCs today HgB.6.8>8.3 (after transfusion)    Assessment: 76 y/o POD #2 Interstitial needle placement with HDR.  Hemoglobin increased from 6.8 to 8.3 after transfusion.    Plan:   -Heme:  Talk with Dr. Raines to see if another blood transfusion is necessary to increase Hgb more prior to tomorrow's last round of interstitial needle HRD.  -Check up with patient tomorrow morning before rounds.              "

## 2017-06-14 NOTE — PROGRESS NOTES
"Gynecology Oncology Progress Note    Ms. 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 S/P EBRT with cisplatin. Admit placement of interstitial needles for HDR.    24 hour events:   -s/p above procedure  -Magnesium low at 1.0, ERP ord'd    Subjective: Patient is feeling well this morning. No chest pain, SOB. No nausea or vomiting. Pain well controlled.     Objective:    /54 (BP Location: Right arm)  Pulse 86  Temp 97.1  F (36.2  C) (Oral)  Resp 16  Ht 1.651 m (5' 5\")  Wt 63.7 kg (140 lb 6.9 oz)  SpO2 97%  BMI 23.37 kg/m2     Gen: Appears well, lying in bed  Cardio: No murmurs on auscultation  Resp: Normal lung sounds on auscultation anteriorly  Abdomen: No abdominal pain  Extremities: SCDs in place          Assessment: 77 year old POD#1 Interstitial needle placement with HDR.     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 S/P EBRT with cisplatin. Currently undergoing interstitial brachytherapy     FEN: Regular diet, Mag 1.0>ERP>2.1  Pain: Bupivacaine epidural and PRN oxycodone and tylenol  CV: History of CHF, HLD, and MI. Continued on home meds coreg and lisinopril Hold ASA and HCTZ.   Pulmonary: History of asthma. Continued home meds.    HEME:- Hgb 9.2>6.8, will transfuse 1u pRBCs. Plt 149>68>71. Holding lovenox , anesthesia aware of low platelets.  GI: Regular diet. PRN antidiarrheal  : Sommers catheter in place until removal of interstitial needle  Neuro: bilateral parestesias of feet, likely neuropathy. Unchanged this AM.  ID: Preop WBC 1.6 and ANC 0.9. No fevers.   PPX: SCDs, IS.  holding lovenox given platelets    Indira Moreno MD  OBGYN PGY3  Gyn Onc Pager [phone]  6/14/2017 6:12 AM      IBart personally examined and evaluated this patient on 06/14/17.  I discussed the patient with the resident " and care team, and agree with the assessment and plan of care as documented in the residents note above.    I personally reviewed vital signs, laboratory values and imaging results.    Pt admitted for interstitial brachytherapy due to stage II cervical cancer.  Day 2/3 of treatment.  Hb 6.8 this AM and will transfuse today.      Janet Raines MD  Gynecologic Oncology  [facility]

## 2017-06-14 NOTE — PROGRESS NOTES
REGIONAL ANESTHESIA PAIN SERVICE EPIDURAL NOTE  SUBJECTIVE:   Interval History: Pt reports adequate pain control via epidural.  Denies any weakness, paresthesias, circumoral numbness, metallic taste or tinnitus.  Pt has activity restrictions.  Patient is currently without nausea; without pruritis.  Currently tolerating a diet.                            Clinically Aligned Pain Assessment (CAPA):  Comfort (How is your pain?): Comfortably manageable  Change in Pain (Since your last medication/intervention?): About the same  Pain Control (How are your pain treatments working?):  Partially effective pain control  Functioning (Are you able to do activities to get better?) : Can't do much do to activity restrictions  Sleep (Does your pain management allow you to sleep or rest?): Awake with occasional pain                           Numerical Rating Scale:    Reports pain 2/10 at rest and 7/10 with movement.             Anticoagulation:  none        OBJECTIVE:  Diagnostics:        Lab Results   Component Value Date     WBC 4.7 06/14/2017            Lab Results   Component Value Date     RBC 2.23 06/14/2017            Lab Results   Component Value Date     HGB 6.8 06/14/2017            Lab Results   Component Value Date     HCT 19.9 06/14/2017            Lab Results   Component Value Date     PLT 71 06/14/2017               Lab Results   Component Value Date     INR 0.97 06/01/2017         Vitals:                        Temp:  [95.7  F (35.4  C)-98  F (36.7  C)] 95.7  F (35.4  C)  Pulse:  [71-86] 71  Heart Rate:  [74-77] 77  Resp:  [14-25] 25  BP: (107-140)/(54-74) 118/58  SpO2:  [93 %-100 %] 98 %     Exam:                         Strength 5/5 and symmetric grossly in bilateral LE                            Epidural site with dressing c/d/i, no tenderness, erythema, heme, edema        ASSESSMENT/PLAN:    Savannah Camacho is a 77 year old female catheter day #1 s/p INSERT INTERSTITIAL NEEDLE, ULTRASOUND GUIDED REMOVE  RADIATION NEEDLES CERVIX and placement of L2-3 epidural for analgesia.  Pt receiving adequate analgesia with epidural infusion Bupivacaine 0.125% at 8mL/hour.  Pt has activity restriction in place.  No weakness or paresthesias.  No evidence of adverse side effects related to local anesthetic.       - continue current epidural infusion at 8mL/hour   - will DC epidural catheter 6/15/17 after last brachytherapy in the afternoon  - will continue to follow and adjust as needed    Austin Flores MD  06/14/2017

## 2017-06-14 NOTE — PLAN OF CARE
Problem: Goal Outcome Summary  Goal: Goal Outcome Summary  Outcome: Therapy, progress towards functional goals is fair  VSS and IPI between 8-9.  Pain managed with bupivicaine epidural at 8 mL/hr and oxycodone q 4hrs. Patient c/o of some tingling in bases of feet and around R ankle. MD notified. MD came to assess patient, per gyn onc MD will continue to monitor and will notify anesthesia if anything changes. Adequate UOP via burk cath. + bowel sounds and flatus. No reports of stool. Fair appetite. Patient finds it difficult to eat with radiation appliance precautions. Will continue to provide emotional support. HOB < 15 degrees and strict bedrest. Radiation remains intact and no s/s of active bleeding with  Perineum. Pericares performed at 2100. Patient to radiation treatment tomorrow morning at 0800. Magnesium replaced.  Recheck ordered for 2230. Awaiting to be drawn. Resting in between cares. PLAN: Pain management, maintain radiation precautions, monitor VS, continue to monitor for s/s of LAST symptoms and continue with current plan of care.

## 2017-06-14 NOTE — PLAN OF CARE
Problem: Goal Outcome Summary  Goal: Goal Outcome Summary  Outcome: Therapy, progress towards functional goals is fair  VSS, BP soft. Pain managed with epidural, PRN tylenol, oxycodone. Interstitial needles in place. Radha cares done. Sommers with adequate urine output. +passing flatus. Strict bedrest with HOB less than 15 degrees. Lab notified writer of Hgb 6.8. MD notified. RBCs ordered. Now awaiting type+cross. Pt scheduled for radiation @0800. Continue POC.

## 2017-06-15 ENCOUNTER — ANESTHESIA (OUTPATIENT)
Dept: SURGERY | Facility: CLINIC | Age: 77
DRG: 741 | End: 2017-06-15
Payer: MEDICARE

## 2017-06-15 ENCOUNTER — APPOINTMENT (OUTPATIENT)
Dept: RADIATION ONCOLOGY | Facility: CLINIC | Age: 77
End: 2017-06-15
Attending: RADIOLOGY
Payer: MEDICARE

## 2017-06-15 VITALS
SYSTOLIC BLOOD PRESSURE: 134 MMHG | HEART RATE: 73 BPM | TEMPERATURE: 96 F | WEIGHT: 140.43 LBS | RESPIRATION RATE: 16 BRPM | DIASTOLIC BLOOD PRESSURE: 69 MMHG | BODY MASS INDEX: 23.4 KG/M2 | HEIGHT: 65 IN | OXYGEN SATURATION: 95 %

## 2017-06-15 DIAGNOSIS — C53.1 MALIGNANT NEOPLASM OF EXOCERVIX (H): Primary | ICD-10-CM

## 2017-06-15 LAB
ERYTHROCYTE [DISTWIDTH] IN BLOOD BY AUTOMATED COUNT: 17 % (ref 10–15)
HCT VFR BLD AUTO: 24.6 % (ref 35–47)
HGB BLD-MCNC: 8.1 G/DL (ref 11.7–15.7)
MAGNESIUM SERPL-MCNC: 1.5 MG/DL (ref 1.6–2.3)
MAGNESIUM SERPL-MCNC: 3 MG/DL (ref 1.6–2.3)
MCH RBC QN AUTO: 29.7 PG (ref 26.5–33)
MCHC RBC AUTO-ENTMCNC: 32.9 G/DL (ref 31.5–36.5)
MCV RBC AUTO: 90 FL (ref 78–100)
PLATELET # BLD AUTO: 73 10E9/L (ref 150–450)
RBC # BLD AUTO: 2.73 10E12/L (ref 3.8–5.2)
WBC # BLD AUTO: 5.1 10E9/L (ref 4–11)

## 2017-06-15 PROCEDURE — 77772 HDR RDNCL NTRSTL/ICAV BRCHTX: CPT | Mod: XE | Performed by: RADIOLOGY

## 2017-06-15 PROCEDURE — 36415 COLL VENOUS BLD VENIPUNCTURE: CPT | Performed by: OBSTETRICS & GYNECOLOGY

## 2017-06-15 PROCEDURE — 83735 ASSAY OF MAGNESIUM: CPT | Performed by: OBSTETRICS & GYNECOLOGY

## 2017-06-15 PROCEDURE — 99232 SBSQ HOSP IP/OBS MODERATE 35: CPT | Mod: GC | Performed by: OBSTETRICS & GYNECOLOGY

## 2017-06-15 PROCEDURE — 25000125 ZZHC RX 250: Performed by: NURSE ANESTHETIST, CERTIFIED REGISTERED

## 2017-06-15 PROCEDURE — 25000132 ZZH RX MED GY IP 250 OP 250 PS 637: Mod: GY | Performed by: NURSE PRACTITIONER

## 2017-06-15 PROCEDURE — 0UPD71Z REMOVAL OF RADIOACTIVE ELEMENT FROM UTERUS AND CERVIX, VIA NATURAL OR ARTIFICIAL OPENING: ICD-10-PCS | Performed by: RADIOLOGY

## 2017-06-15 PROCEDURE — 77336 RADIATION PHYSICS CONSULT: CPT | Performed by: RADIOLOGY

## 2017-06-15 PROCEDURE — 77772 HDR RDNCL NTRSTL/ICAV BRCHTX: CPT | Performed by: RADIOLOGY

## 2017-06-15 PROCEDURE — 25000128 H RX IP 250 OP 636: Performed by: NURSE ANESTHETIST, CERTIFIED REGISTERED

## 2017-06-15 PROCEDURE — 40000170 ZZH STATISTIC PRE-PROCEDURE ASSESSMENT II: Performed by: RADIOLOGY

## 2017-06-15 PROCEDURE — C1717 BRACHYTX, NON-STR,HDR IR-192: HCPCS | Performed by: RADIOLOGY

## 2017-06-15 PROCEDURE — 77280 THER RAD SIMULAJ FIELD SMPL: CPT | Performed by: RADIOLOGY

## 2017-06-15 PROCEDURE — 85027 COMPLETE CBC AUTOMATED: CPT | Performed by: OBSTETRICS & GYNECOLOGY

## 2017-06-15 PROCEDURE — 25000128 H RX IP 250 OP 636: Performed by: NURSE PRACTITIONER

## 2017-06-15 PROCEDURE — 37000008 ZZH ANESTHESIA TECHNICAL FEE, 1ST 30 MIN: Performed by: RADIOLOGY

## 2017-06-15 PROCEDURE — 37000009 ZZH ANESTHESIA TECHNICAL FEE, EACH ADDTL 15 MIN: Performed by: RADIOLOGY

## 2017-06-15 PROCEDURE — A9270 NON-COVERED ITEM OR SERVICE: HCPCS | Mod: GY | Performed by: RADIOLOGY

## 2017-06-15 PROCEDURE — A9270 NON-COVERED ITEM OR SERVICE: HCPCS | Mod: GY | Performed by: NURSE PRACTITIONER

## 2017-06-15 PROCEDURE — 25000132 ZZH RX MED GY IP 250 OP 250 PS 637: Mod: GY | Performed by: RADIOLOGY

## 2017-06-15 PROCEDURE — 36000051 ZZH SURGERY LEVEL 2 1ST 30 MIN - UMMC: Performed by: RADIOLOGY

## 2017-06-15 RX ORDER — LIDOCAINE HYDROCHLORIDE 20 MG/ML
INJECTION, SOLUTION INFILTRATION; PERINEURAL PRN
Status: DISCONTINUED | OUTPATIENT
Start: 2017-06-15 | End: 2017-06-15

## 2017-06-15 RX ORDER — FENTANYL CITRATE 50 UG/ML
INJECTION, SOLUTION INTRAMUSCULAR; INTRAVENOUS PRN
Status: DISCONTINUED | OUTPATIENT
Start: 2017-06-15 | End: 2017-06-15

## 2017-06-15 RX ORDER — ACETAMINOPHEN 325 MG/1
650 TABLET ORAL EVERY 4 HOURS PRN
Qty: 100 TABLET | Refills: 0 | COMMUNITY
Start: 2017-06-15

## 2017-06-15 RX ORDER — SODIUM CHLORIDE, SODIUM LACTATE, POTASSIUM CHLORIDE, CALCIUM CHLORIDE 600; 310; 30; 20 MG/100ML; MG/100ML; MG/100ML; MG/100ML
INJECTION, SOLUTION INTRAVENOUS CONTINUOUS PRN
Status: DISCONTINUED | OUTPATIENT
Start: 2017-06-15 | End: 2017-06-15

## 2017-06-15 RX ORDER — PROPOFOL 10 MG/ML
INJECTION, EMULSION INTRAVENOUS PRN
Status: DISCONTINUED | OUTPATIENT
Start: 2017-06-15 | End: 2017-06-15

## 2017-06-15 RX ORDER — SODIUM CHLORIDE, SODIUM LACTATE, POTASSIUM CHLORIDE, CALCIUM CHLORIDE 600; 310; 30; 20 MG/100ML; MG/100ML; MG/100ML; MG/100ML
INJECTION, SOLUTION INTRAVENOUS CONTINUOUS
Status: DISCONTINUED | OUTPATIENT
Start: 2017-06-15 | End: 2017-06-15 | Stop reason: HOSPADM

## 2017-06-15 RX ORDER — PHENAZOPYRIDINE HYDROCHLORIDE 200 MG/1
200 TABLET, FILM COATED ORAL ONCE
Status: COMPLETED | OUTPATIENT
Start: 2017-06-15 | End: 2017-06-15

## 2017-06-15 RX ADMIN — ACETAMINOPHEN 650 MG: 325 TABLET, FILM COATED ORAL at 06:20

## 2017-06-15 RX ADMIN — CARVEDILOL 6.25 MG: 3.12 TABLET, FILM COATED ORAL at 07:54

## 2017-06-15 RX ADMIN — ACETAMINOPHEN 650 MG: 325 TABLET, FILM COATED ORAL at 02:15

## 2017-06-15 RX ADMIN — MIDAZOLAM HYDROCHLORIDE 1 MG: 1 INJECTION, SOLUTION INTRAMUSCULAR; INTRAVENOUS at 16:03

## 2017-06-15 RX ADMIN — MAGNESIUM SULFATE IN WATER 4 G: 40 INJECTION, SOLUTION INTRAVENOUS at 10:16

## 2017-06-15 RX ADMIN — SODIUM CHLORIDE, POTASSIUM CHLORIDE, SODIUM LACTATE AND CALCIUM CHLORIDE: 600; 310; 30; 20 INJECTION, SOLUTION INTRAVENOUS at 15:53

## 2017-06-15 RX ADMIN — THERA TABS 1 TABLET: TAB at 07:54

## 2017-06-15 RX ADMIN — OXYCODONE HYDROCHLORIDE 5 MG: 5 TABLET ORAL at 11:08

## 2017-06-15 RX ADMIN — LIDOCAINE HYDROCHLORIDE 40 MG: 20 INJECTION, SOLUTION INFILTRATION; PERINEURAL at 16:05

## 2017-06-15 RX ADMIN — FENTANYL CITRATE 50 MCG: 50 INJECTION, SOLUTION INTRAMUSCULAR; INTRAVENOUS at 16:03

## 2017-06-15 RX ADMIN — PROPOFOL 20 MG: 10 INJECTION, EMULSION INTRAVENOUS at 16:10

## 2017-06-15 RX ADMIN — PROPOFOL 20 MG: 10 INJECTION, EMULSION INTRAVENOUS at 16:04

## 2017-06-15 RX ADMIN — LISINOPRIL 5 MG: 5 TABLET ORAL at 07:54

## 2017-06-15 RX ADMIN — PHENAZOPYRIDINE HYDROCHLORIDE 200 MG: 200 TABLET, FILM COATED ORAL at 15:52

## 2017-06-15 RX ADMIN — PROPOFOL 10 MG: 10 INJECTION, EMULSION INTRAVENOUS at 16:06

## 2017-06-15 ASSESSMENT — ACTIVITIES OF DAILY LIVING (ADL)
RETIRED_EATING: 0-->INDEPENDENT
FALL_HISTORY_WITHIN_LAST_SIX_MONTHS: NO
COGNITION: 0 - NO COGNITION ISSUES REPORTED
BATHING: 0-->INDEPENDENT
RETIRED_COMMUNICATION: 0-->UNDERSTANDS/COMMUNICATES WITHOUT DIFFICULTY
DRESS: 0-->INDEPENDENT
AMBULATION: 0-->INDEPENDENT
TOILETING: 0-->INDEPENDENT
SWALLOWING: 0-->SWALLOWS FOODS/LIQUIDS WITHOUT DIFFICULTY
TRANSFERRING: 0-->INDEPENDENT

## 2017-06-15 ASSESSMENT — PAIN DESCRIPTION - DESCRIPTORS
DESCRIPTORS: SORE;BURNING
DESCRIPTORS: SORE
DESCRIPTORS: SORE;BURNING
DESCRIPTORS: BURNING;SORE

## 2017-06-15 NOTE — ADDENDUM NOTE
Addendum  created 06/15/17 1707 by Birgit Mendoza MD    Anesthesia Intra LDAs edited, LDA properties accepted

## 2017-06-15 NOTE — ANESTHESIA PREPROCEDURE EVALUATION
Anesthesia Evaluation     . Pt has had prior anesthetic. Type: General and MAC (persistent chills at home)    No history of anesthetic complications          ROS/MED HX    ENT/Pulmonary:     (+)Intermittent asthma Last exacerbation: never needed urgen care,Treatment: Inhaler prn,  , . Other pulmonary disease Chronic cough / vocal cord nodules/ voice therapy has helped.    Neurologic:  - neg neurologic ROS     Cardiovascular:     (+) hypertension----. Taking blood thinners Pt has not received instructions: . CHF etiology: Takutsubo Last EF: 65% date: 5/2016 . . :. .       METS/Exercise Tolerance:  >4 METS   Hematologic:     (+) Anemia, Other Hematologic Disorder-thrombocytopenia      Musculoskeletal:  - neg musculoskeletal ROS       GI/Hepatic:     (+) GERD Asymptomatic on medication,       Renal/Genitourinary:     (+) chronic renal disease, type: ARF, Pt does not require dialysis, Pt has no history of transplant,       Endo:  - neg endo ROS       Psychiatric:  - neg psychiatric ROS       Infectious Disease:  - neg infectious disease ROS       Malignancy:   (+) Malignancy History of Other  Other CA Active status post Chemo and Radiation         Other:    (+) No chance of pregnancy no H/O Chronic Pain,no other significant disability                    Physical Exam  Normal systems: dental    Airway   Mallampati: II  TM distance: <3 FB  Neck ROM: full    Dental     Cardiovascular   Rhythm and rate: regular and normal      Pulmonary    breath sounds clear to auscultation                 PAC Discussion and Assessment    ASA Classification: 3  Case is suitable for:   Anesthetic techniques and relevant risks discussed: GA with regional block for post-op pain control  Invasive monitoring and risk discussed:   Types:   Possibility and Risk of blood transfusion discussed:   NPO instructions given:   Additional anesthetic preparation and risks discussed:   Needs early admission to pre-op area:   Other:     PAC Resident/NP  Anesthesia Assessment:  77 year old female for insertion of interstitial needles in cervix, with Dr Nicole Redd, on 6/11/17 and removal with Dr. Michelle Carrion on 6/13/17, in treatment of cervical cancer. Pac referral for risk assessment and optimization for anesthesia with comorbid conditions of HTN, CHF, asthma.  Pre-operative considerations include:  1.) Cardiac: HTN. 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 NL coronaries 2009. Stress ECHO 3/2015 no ischemia EF 70%; ECHO 5/2016 EF 65%, moderate mitral regurgitation.  Exercise tolerance is >4 METS. RCRI= 0.4%  She has been off her diuretic (dehydration) and statin (elevate LFTs).   -Continue B-blocker to DOS  -HOLD ASA  2.) Pulmonary: Intermittent asthma-on singulair. No acute exacerbations. PFTs 8/2016 mild restriction. Vocal cord nodule with hoarseness. Rt lung thickening at apex-on PET scan. VERÓNICA risk 2/8= low risk.   Take prescribed singulair and albuterol prn DOS  3.) HEME: Cycle 1 chemotherapy with Cisplatin begun 5/1/17.  Complications of anemia, hyponatremia, ISABELLA due to dehydration. 5/30/17 NA+ 138, HGB 9.4, wbc 2.3, Cr 0.72.  VTE risk-current cancer, age elevate risk to 3%  4.) GI: PONV risk = 2 (2 or > antiemetic prophylaxis recommended)  Lat GA was foot 2009-no problems. Pt also evaluated by Dr. Jeff. See his recommendations below.      Reviewed and Signed by PAC Mid-Level Provider/Resident  Mid-Level Provider/Resident: Brittany Epps PA-C  Date: 6/1/17  Time: 12:44 PM    Attending Anesthesiologist Anesthesia Assessment:  I have examined the patient and reviewed the chart.  I have discussed the patient with the MEG and concur with her assessment.  The patient had Takutsubo Cardiomyopathy in 2009 which has completely resolved on subsequent echocardiograms (EF 65%).  She also has a history of vocal cord polyps which have not been treated.    PE:  Somewhat stridorous voice,  MPC 2, 3 FBTMD, good  extension, Lungs clear, CVS  RRR with no murmur.    Discussed epidural for 72 hour pain relief, GA vs regional/MAC.  Final plan per anesthesiologist the day of surgery.  COagulation panel drawn today given recent chemotherapy.    Reviewed and Signed by PAC Anesthesiologist  Anesthesiologist: Merritt Jeff MD  Date: 06/01/2017  Time:   Pass/Fail:   Disposition:     PAC Pharmacist Assessment:        Pharmacist:   Date:   Time:      Anesthesia Plan      History & Physical Review  History and physical reviewed and following examination; no interval change.    ASA Status:  3 .    NPO Status:  > 4 hours and > 8 hours    Plan for MAC with Intravenous and Propofol induction. Maintenance will be TIVA.    PONV prophylaxis:  Ondansetron (or other 5HT-3) and Dexamethasone or Solumedrol          Postoperative Care  Postoperative pain management:  IV analgesics.      Consents  Anesthetic plan, risks, benefits and alternatives discussed with:  Patient.  Use of blood products discussed: No .   .          77F with cervical cancer here for removal of radiation needles and epidural catheter.  ASA 3.  Plan: MAC    Lina Ogden MD  Staff Anesthesiologist  Pager [phone]

## 2017-06-15 NOTE — ANESTHESIA POSTPROCEDURE EVALUATION
Patient: Savannah Camacho    Procedure(s):  Removal of Radiation Needles Cervix  - Wound Class: II-Clean Contaminated    Diagnosis:Cervical Cancer   Diagnosis Additional Information: No value filed.    Anesthesia Type:  MAC    Note:  Anesthesia Post Evaluation    Patient location during evaluation: Floor  Patient participation: Able to fully participate in evaluation  Level of consciousness: awake and alert  Pain management: adequate  Airway patency: patent  Cardiovascular status: acceptable  Respiratory status: acceptable  Hydration status: acceptable  PONV: none     Anesthetic complications: None    Comments: Epidural catheter removed, tip intact.        Last vitals:  Vitals:    06/14/17 2201 06/15/17 0728 06/15/17 1640   BP: 116/61 139/67 134/69   Pulse:   73   Resp: 23 18 16   Temp: 36.4  C (97.6  F) 35.8  C (96.4  F) 35.6  C (96  F)   SpO2: 96% 98% 95%         Electronically Signed By: Lina Ogden MD  Hiwot 15, 2017  4:44 PM

## 2017-06-15 NOTE — PLAN OF CARE
"Problem: Goal Outcome Summary  Goal: Goal Outcome Summary  Outcome: No Change  VSS for pt.Lung sounds clear through all lobes.Epidural intact,infusing at 8 cc's/hour.Dressing is cdi.Pt stated she was having increased pain,oxycodone x1 with stated decrease in pain although pt states she wishes she hadn't taken it as it makes her \"loopy.\"Pt states she did sleep well after taking the med.Is currently at 2nd radiation treatment for the day,will then go to the OR to have appliance removed.Moderate amount of bloody vaginal drainage,pericares done.Has been NPO since 8 am this am.Mg level needs to be redrawn post Mg infusion.Nurse in PACU will do if not drawn on station.Pt hopes for discharge this afternoon as she has a MD appointment in [place] tomorrow.      "

## 2017-06-15 NOTE — PLAN OF CARE
Problem: Goal Outcome Summary  Goal: Goal Outcome Summary  Outcome: Therapy, progress toward functional goals as expected  VSS. Pain managed with epidural 8/hr, and PRN tylenol. PRN oxycodone available. Tolerating clears overnight; NPO @0800, pt aware. Sommers with good urine volumes. Strict bedrest maintained, head of bed less than 15 degrees. Pt resting comfortably. Plan for first radiation today @0800. And removal of needles this afternoon. Continue POC.

## 2017-06-15 NOTE — PROGRESS NOTES
REGIONAL ANESTHESIA PAIN SERVICE EPIDURAL NOTE  SUBJECTIVE:   Interval History: Pt reports good pain control via epidural.  Pt reports her pain has not changed since yesterday. Denies any back pain, headache, weakness, paresthesias, circumoral numbness, metallic taste or tinnitus.  Pt has activity restrictions.  Patient is currently without nausea; without pruritis.  Currently tolerating a regular diet.       Clinically Aligned Pain Assessment (CAPA):  Comfort (How is your pain?): Comfortably manageable  Change in Pain (Since your last medication/intervention?): About the same  Pain Control (How are your pain treatments working?):  Partially effective pain control  Functioning (Are you able to do activities to get better?) : Can't do much do to activity restrictions  Sleep (Does your pain management allow you to sleep or rest?): Awake with occasional pain      Anticoagulation:  none      OBJECTIVE:  Diagnostics:  Lab Results   Component Value Date    WBC 5.1 06/15/2017     Lab Results   Component Value Date    RBC 2.73 06/15/2017     Lab Results   Component Value Date    HGB 8.1 06/15/2017     Lab Results   Component Value Date    HCT 24.6 06/15/2017     Lab Results   Component Value Date    PLT 73 06/15/2017       Lab Results   Component Value Date    INR 0.97 06/01/2017       Vitals:    Temp:  [95.7  F (35.4  C)-98  F (36.7  C)] 96.4  F (35.8  C)  Pulse:  [71-80] 73  Heart Rate:  [77] 77  Resp:  [18-25] 18  BP: (107-139)/(55-74) 139/67  SpO2:  [96 %-100 %] 98 %    Exam:    Strength 5/5 and symmetric grossly in bilateral LE      Epidural site with dressing c/d/i, no tenderness, erythema, heme, edema      ASSESSMENT/PLAN:    Savannah Camacho is a 77 year old female catheter day #2 s/p INSERT INTERSTITIAL NEEDLE, ULTRASOUND GUIDED  REMOVE RADIATION NEEDLES CERVIX and placement of L2-3 epidural for analgesia.  Pt receiving adequate analgesia with epidural infusion Bupivacaine 0.125% at 8mL/hr.  Pt has activity  restriction.  No weakness or paresthesias.  No evidence of adverse side effects related to local anesthetic.    - 0800 continue current epidural infusion at 8mL/hour   - will DC epidural catheter this afternoon after last therapy  - will sign off once catheter is DC'd  - call for questions or concerns  - discussed plan with attending anesthesiologist        CASPER Huffman CNP  Regional Anesthesia Pain Service  6/15/2017 7:33 AM    24 hour Job Code Pager.  For in-house use only.     Dial * * [phone] and  [place]:  [phone]  [place]: [phone]  Peds:  [phone]  Then enter call-back number  May text page using [employer], but NOT [employer].

## 2017-06-15 NOTE — PROGRESS NOTES
"Gynecology Oncology Progress Note     Ms. 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 S/P EBRT with cisplatin. Admit placement of interstitial needles for HDR.     24 hour events:   -s/p radiation  -plan for removal today     Subjective: Patient is feeling well this morning. Pain well controlled. No nausea, vomiting, chest pain or SOB. Tingling in lower extremities is unchanged.     Objective:    /61 (BP Location: Right arm)  Pulse 73  Temp 97.6  F (36.4  C) (Oral)  Resp 23  Ht 1.651 m (5' 5\")  Wt 63.7 kg (140 lb 6.9 oz)  SpO2 96%  BMI 23.37 kg/m2   Gen: Appears well, lying in bed  Cardio: No murmurs on auscultation  Resp: Normal lung sounds on auscultation anteriorly  Abdomen: No abdominal tenderness, +BS  Extremities: SCDs in place     I/O:  I/O last 3 completed shifts:  In: 1403.17 [P.O.:960; I.V.:133.17]  Out: 2950 [Urine:2950]    I/O this shift:  In: 240 [P.O.:240]  Out: 375 [Urine:375]    Labs:   CBC: WBC 5.1, HGB 8.1, Plt 73   Magnesium 1.5       Assessment: 77 year old POD#2 Interstitial needle placement with HDR.      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 S/P EBRT with cisplatin. Currently undergoing interstitial brachytherapy      FEN: Regular diet, ERP  Pain: Bupivacaine epidural and PRN oxycodone and tylenol  CV: History of CHF, HLD, and MI. Continued on home coreg and lisinopril Hold ASA and HCTZ due to interstitials in place.   Pulmonary: History of asthma. Continued home meds.    HEME:- Anemia secondary to chemotherapy and chronic disease s/p 1 unit PRBC's with appropriate rise.  Thrombocytopenia due to chemotherapy.  GI:  PRN antidiarrheal  : Sommers catheter in place until removal of interstitial needle  Neuro: bilateral parestesias of feet, likely neuropathy stable.  ID: Neutropenia but no focal signs of " infection  PPX: SCDs, IS.  holding lovenox given platelets     Indira Moreno MD  OBGYN PGY3  Gyn Onc Pager [phone]  6/15/2017 5:50 AM     I, Bart Raines personally examined and evaluated this patient on 06/15/17.  I discussed the patient with the resident and care team, and agree with the assessment and plan of care as documented in the residents note above.    I personally reviewed vital signs, laboratory values and imaging results.    Pt with cervical cancer admitted for interstitial brachytherapy.  Pt tolerating well.  No new issues today.  Plan for d/c home later today following last dose and removal of interstitial needles.    Janet Raines MD  Gynecologic Oncology  [facility]

## 2017-06-15 NOTE — OP NOTE
DATE: 6/15/2017  PREOPERATIVE DIAGNOSIS: SCC of the cervix, FIGO stage IIB  POSTOPERATIVE DIAGNOSIS: Same  NAME OF THE OPERATION: Removal of HDR interstitial needles and Keegan template under MAC anesthesia       SURGEONS: Dr. Michelle Carrion MD Radiation Oncology   ASSISTANTS: Bobby Ward MD, Radiation Oncology   ANESTHESIA: MAC  ESTIMATED BLOOD LOSS: <5 cc   COMPLICATIONS: None.   INDICATION: Presence of interstitial Keegan brachytherapy device for HDR brachytherapy     OPERATIVE PROCEDURE: Ms. Camacoh was brought to the operating room wearing pneumo-boots and identified to be herself. The patient was placed under MAC anesthesia and ventilated with an oxygen mask . A time out was performed to verify patient identity and planned procedure. She was then placed in a frog legged position. The DuoDerm was removed from her inner thighs.  A total of 5 sutures were removed which held the HDR template and vaginal cylinder in place.  The template was removed with the interstitial needles and the vaginal cylinder from her perineal area.  There was <5cc of blood loss.  Compression was applied and bleeding stopped and the procedure was completed.  Her epidural catheter was then removed in the OR. She was then transferred to unit 7C     PLAN:  1. Discharge to home when stable per GynOnc  2. Follow up with GynOnc as scheduled      Bobby Ward MD  Department of Radiation Oncology  [facility]      I was present and supervised the entire procedure.    Michelle Carrion MD

## 2017-06-15 NOTE — PLAN OF CARE
Problem: Goal Outcome Summary  Goal: Goal Outcome Summary  Outcome: Therapy, progress toward functional goals is gradual  AVSS. HOB less than 15 degrees. Pain managed with epidural 8ml/hr. Site is c/d/i. Tolerating regular diet. Patient to be clear liquid diet at midnight and NPO at 0800 for removal of needles at 1430. Denies nausea. No BM this shift, passing gas. Voiding adequate amounts of urine with burk. Perineal care completed per MD order. Small amount of serosanguineous drainage. Hgb recheck 8.3. PLAN: pain control, omayra care, radiation, and removal of interstitial needles tomorrow.

## 2017-06-16 ENCOUNTER — CARE COORDINATION (OUTPATIENT)
Dept: CARE COORDINATION | Facility: CLINIC | Age: 77
End: 2017-06-16

## 2017-06-16 NOTE — PLAN OF CARE
Problem: Goal Outcome Summary  Goal: Goal Outcome Summary  Outcome: Adequate for Discharge Date Met:  06/15/17  Pt arrived from IR around 1645 VSS on RA. Pt denies pain. Small amounts of vaginal bleeding; discharge teaching competed on how to care for vaginal area, burk removed and able to void. Large BM and passing gas. Steady on feet without dizziness. Tolerating regular diet. Discharge paperwork given and explained to pt and daughter. Pt left unit in wheelchair.

## 2017-06-16 NOTE — PROGRESS NOTES
"[facility]  \"Hello, my name is Alba Hendrix , and I am calling from the [facility].  I want to check in and see how you are doing, after leaving the hospital.  You may also receive a call from your Care Coordinator (care team), but I want to make sure you don t have any urgent needs.  I have a couple questions to review with you:     Post-Discharge Outreach                                                    Savannah Camacho is a 77 year old female     Follow-up Appointments           Adult [facility] Follow-up and recommended labs and tests         Follow up with Dr Lee 7/28/17. No need for imaging prior to seeing Dr Lee. Call prior to then for questions or concerns [phone].      Follow up with ENT 6/29/17. Please call [phone] to schedule an appt. Please bring all of your records with you to this appt.      Appointments on [place] and/or [facility] (with [facility] or [facility] provider or service). Call [phone] if you haven't heard regarding these appointments within 7 days of discharge.                        Your next 10 appointments already scheduled            Jun 29, 2017  3:00 PM CDT   (Arrive by 2:45 PM)   New Patient Visit with Jose Eduardo José MD   [facility] ([facility] [employer])     [address] [place] [address]  4th Floor  [facility] [address] [phone]                  Jul 28, 2017  9:00 AM CDT   (Arrive by 8:45 AM)   Return Visit with Kalee Lee MD   [facility] ([facility] [employer])     [address] [place] [address]  2nd Floor  [facility] [address] [phone]              Care Team:    Patient Care Team       Relationship Specialty Notifications Start End    Adán Lanier MD PCP - General Family Practice  4/13/17     Phone: [phone] Fax: [phone]         [address] [place] [facility] [address]            Transition of Care " Review                                                      Patient was called three times and no answer so post 24 hr DC follow up calls will be closed out               Plan                                                      Thanks for your time.  Your Care Coordinator may follow-up within the next couple days.  In the meantime if you have questions, concerns or problems call your care team.        Alba Hendrix

## 2017-06-16 NOTE — PROGRESS NOTES
Received call from RN that patient is ready to go. She is s/p interstitial needle placement and removal of her epidural. Ambulating without difficulty. Has had two bowel movements and voided after burk removal. Pain well controlled. Ate dinner without nausea or vomiting. Feels comfortable with discharge to home. Planning to stay at [facility] and travel back to [place] tomorrow.    Indira Moreno PGY3  6/15/2017 7:29 PM

## 2017-06-19 NOTE — PROGRESS NOTES
A radiation therapy treatment planning simulation was performed.  HDR brachytherapy Keegan interstitial fraction 1 of 5 was delivered.  Please see the [employer] record for documentation.    Nicole Ward MD  Radiation Oncology

## 2017-06-20 ENCOUNTER — ONCOLOGY VISIT (OUTPATIENT)
Dept: RADIATION ONCOLOGY | Facility: CLINIC | Age: 77
End: 2017-06-20

## 2017-06-20 NOTE — MR AVS SNAPSHOT
After Visit Summary   6/20/2017    Savannah Camacho    MRN: [id number]           Patient Information     Date Of Birth          1940        Visit Information        Provider Department      6/20/2017 10:00 PM Michelle Carrion MD Radiation [facility]         Follow-ups after your visit        Your next 10 appointments already scheduled     Jun 29, 2017  3:00 PM CDT   (Arrive by 2:45 PM)   New Patient Visit with Jose Eduardo José MD   [facility] ([facility])    [address] [place] [address]  [address]th [facility] [address] [phone]            Jul 28, 2017  9:00 AM CDT   (Arrive by 8:45 AM)   Return Visit with Kalee Lee MD   [facility] ([facility])    [address] [place] [address]  2nd [facility] [address] [phone]              Who to contact     Please call your clinic at [phone] to:    Ask questions about your health    Make or cancel appointments    Discuss your medicines    Learn about your test results    Speak to your doctor   If you have compliments or concerns about an experience at your clinic, or if you wish to file a complaint, please contact [facility] Patient Relations at [phone] or email us at [email or web address]         Additional Information About Your Visit        MyChart Information     [employer] is an electronic gateway that provides easy, online access to your medical records. With [employer], you can request a clinic appointment, read your test results, renew a prescription or communicate with your care team.     To sign up for [employer] visit the website at [email or web address]   You will be asked to enter the access code listed below, as well as some personal information. Please follow the directions to create your username and password.     Your access code is: [email or web address]  Expires: 7/18/2017 11:20 AM     Your  access code will  in 90 days. If you need help or a new code, please contact your [facility] or call [phone] for assistance.        Care EveryWhere ID     This is your Care EveryWhere ID. This could be used by other organizations to access your [facility] medical records  [id number]         Blood Pressure from Last 3 Encounters:   No data found for BP    Weight from Last 3 Encounters:   No data found for Wt              Today, you had the following     No orders found for display       Primary Care Provider Office Phone # Fax #    Adán Lanier [phone]       [place] [address] [address] [facility] [address]        Thank you!     Thank you for choosing [facility]  for your care. Our goal is always to provide you with excellent care. Hearing back from our patients is one way we can continue to improve our services. Please take a few minutes to complete the written survey that you may receive in the mail after your visit with us. Thank you!             Your Updated Medication List - Protect others around you: Learn how to safely use, store and throw away your medicines at www.disposemymeds.org.          This list is accurate as of: 17 10:00 PM.  Always use your most recent med list.                   Brand Name Dispense Instructions for use    acetaminophen 325 MG tablet    TYLENOL    100 tablet    Take 2 tablets (650 mg) by mouth every 4 hours as needed for mild pain or fever       albuterol 108 (90 BASE) MCG/ACT Inhaler    PROAIR HFA/PROVENTIL HFA/VENTOLIN HFA     Inhale 2 puffs into the lungs every 6 hours as needed Reported on 2017       ASPIRIN PO      Take 81 mg by mouth daily (with dinner)       COREG PO      Take 6.25 mg by mouth 2 times daily       hydrochlorothiazide 25 MG tablet    HYDRODIURIL     daily On hold for now 17, Reported on 2017       lisinopril 5 MG tablet    PRINIVIL/ZESTRIL     Take 5  mg by mouth 2 times daily Reported on 5/5/2017       loperamide 2 MG capsule    IMODIUM     Take 2 mg by mouth 4 times daily as needed for diarrhea       LORazepam 1 MG tablet    ATIVAN    30 tablet    Take 1 tablet (1 mg) by mouth every 6 hours as needed (nausea/vomiting, anxiety or sleep )       MULTIVITAMIN ADULT PO      Take by mouth every morning       prochlorperazine 10 MG tablet    COMPAZINE    30 tablet    Take 1 tablet (10 mg) by mouth every 6 hours as needed (nausea/vomiting)       SIMVASTATIN PO      Take 40 mg by mouth once On hold 5/17/17 per Gyn onc  Reported on 5/16/2017       SINGULAIR PO      Take 10 mg by mouth every evening Reported on 5/16/2017

## 2017-06-21 NOTE — PROCEDURES
Radiotherapy Treatment Summary          Date of Report: 2017     PATIENT: VICKY BAEZ  MEDICAL RECORD NO: [id number]  : 1940     DIAGNOSIS: C53.1 Malignant neoplasm of exocervix  INTENT OF RADIOTHERAPY: Cure  PATHOLOGY:  Squamous cell carcinoma of cervix                                  STAGE: FIGO IIB  CONCURRENT SYSTEMIC THERAPY:   Yes. Cisplatin                  Details of the treatments summarized below are found in records kept in the Department of Radiation Oncology at [facility].     Treatment Summary:  Radiation Oncology - Course: 1 Protocol:   Treatment Site Current Dose    Modality From To Elapsed Days Fx.  1 Pelvis  4,500 cGy        10 X  5/01/2017  2017  35 25  1 HDR Pelvis Boost  2,250 cGy     2017  6/15/2017   2  5          Dose per Fraction:  180 cGy/450 cGy       Total Dose:      6,750 cGy        COMMENTS:                      (Acute Toxicity Profile by CTC v4.0)   Ms. 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 An MRI completed at [facility]  showed a lesion involving the right lateral aspect of the cervix with   extension into the parametrium and right upper vagina and fornix.  Thus, her diagnosis was SCC of cervix, FIGO IIB. She   underwent treatment with concurrent chemoradiation with cisplatin. Her radiation consistent of a course of external beam   radiation followed by an interstitial HDR brachytherapy boost. During treatment she experienced grade 1 diarrhea and   grade 1 fatigue.      PAIN MANAGEMENT:     She had an epidural in place during the interstitial brachytherapy.                           FOLLOW UP PLAN:       Return to clinic 1 month after treatment completion.                      Resident Physician:   Bobby Ward M.D.   Staff Physician: Michelle Carrion M.D.  Physicist: Gogo Ramos, PhD     CC:   MD Kalee Sanii MD                                     Radiation Oncology:  [facility] [address] [place] [address]

## 2017-06-22 ENCOUNTER — PRE VISIT (OUTPATIENT)
Dept: OTOLARYNGOLOGY | Facility: CLINIC | Age: 77
End: 2017-06-22

## 2017-06-22 NOTE — TELEPHONE ENCOUNTER
1.  Date/reason for appt:  6/29/17   Dysphonia    2.  Referring provider:  Dr Gold  --  Surgeries are in EPIC    3.  Call to patient (Yes / No - short description):  No, records are at [facility]    4.  Previous care at / records requested from:  [facility]

## 2017-06-23 NOTE — TELEPHONE ENCOUNTER
Records received from [facility].   Included  Office notes: 2/28/01, 7/6/07, 5/6/08, 5/1/12, 10/1/12, 10/16/12, 9/16/13, 4/8/14, 2/19/15, 3/17/15, 5/20/15, 8/17/15, 9/17/15, 9/23/15, 12/10/15, 12/12/15, 1/18/16, 4/4/16, 5/25/16, 8/31/16, 11/17/16   SLP notes: 10/28/15, 12/30/16  Radiology reports: CT neck soft tissue on 11/121/5  Other: labs     Missing/needed records: imaging

## 2017-07-21 ENCOUNTER — ONCOLOGY VISIT (OUTPATIENT)
Dept: ONCOLOGY | Facility: CLINIC | Age: 77
End: 2017-07-21
Attending: OBSTETRICS & GYNECOLOGY
Payer: MEDICARE

## 2017-07-21 ENCOUNTER — OFFICE VISIT (OUTPATIENT)
Dept: RADIATION ONCOLOGY | Facility: CLINIC | Age: 77
End: 2017-07-21
Attending: RADIOLOGY
Payer: MEDICARE

## 2017-07-21 VITALS
TEMPERATURE: 98.2 F | OXYGEN SATURATION: 97 % | HEART RATE: 74 BPM | RESPIRATION RATE: 16 BRPM | BODY MASS INDEX: 23.66 KG/M2 | DIASTOLIC BLOOD PRESSURE: 72 MMHG | WEIGHT: 142.2 LBS | SYSTOLIC BLOOD PRESSURE: 116 MMHG

## 2017-07-21 VITALS
SYSTOLIC BLOOD PRESSURE: 148 MMHG | HEART RATE: 72 BPM | OXYGEN SATURATION: 98 % | DIASTOLIC BLOOD PRESSURE: 69 MMHG | WEIGHT: 142.3 LBS | BODY MASS INDEX: 23.68 KG/M2

## 2017-07-21 DIAGNOSIS — C53.1 MALIGNANT NEOPLASM OF EXOCERVIX (H): Primary | ICD-10-CM

## 2017-07-21 PROCEDURE — 99213 OFFICE O/P EST LOW 20 MIN: CPT | Mod: ZP | Performed by: OBSTETRICS & GYNECOLOGY

## 2017-07-21 PROCEDURE — 99213 OFFICE O/P EST LOW 20 MIN: CPT

## 2017-07-21 PROCEDURE — 99213 OFFICE O/P EST LOW 20 MIN: CPT | Performed by: RADIOLOGY

## 2017-07-21 ASSESSMENT — PAIN SCALES - GENERAL: PAINLEVEL: NO PAIN (0)

## 2017-07-21 NOTE — NURSING NOTE
"Oncology Rooming Note    July 21, 2017 1:49 PM   Savannah Camacho is a 77 year old female who presents for:    Chief Complaint   Patient presents with     Oncology Clinic Visit      Radaition, chemo f\u     Initial Vitals: /72 (BP Location: Left arm, Patient Position: Chair, Cuff Size: Adult Regular)  Pulse 74  Temp 98.2  F (36.8  C) (Oral)  Resp 16  Wt 64.5 kg (142 lb 3.2 oz)  SpO2 97%  BMI 23.66 kg/m2 Estimated body mass index is 23.66 kg/(m^2) as calculated from the following:    Height as of 6/13/17: 1.651 m (5' 5\").    Weight as of this encounter: 64.5 kg (142 lb 3.2 oz). Body surface area is 1.72 meters squared.  No Pain (0) Comment: Data Unavailable   No LMP recorded. Patient is postmenopausal.  Allergies reviewed: Yes  Medications reviewed: Yes    Medications: Medication refills not needed today.  Pharmacy name entered into EPIC: Data Unavailable    Clinical concerns: results  Lee was notified.    8 minutes for nursing intake (face to face time)     Daphne Solano MA              "

## 2017-07-21 NOTE — LETTER
2017       RE: Savannah Camacho  [address] [facility] [place] [address]     Dear Colleague,    Thank you for referring your patient, Savannah Camacho, to the [facility]. Please see a copy of my visit note below.    GYN Oncology Followup    Referring provider:    Adán Lanier MD  [address] [place] [address]   RE: Savannah Camacho  : 1940  FEMI: 2017    CC: Followup cervical cancer, stage IIB with positive nodes.     HPI: Ms Savannah Camacho is a 77 year old year old White  female who presents for followup of her cervical cancer. She is here today with her .    Treatment history:   2014: ASCUS pap. No further workup  2017: Vaginal discharge.   3/2017: [place]A in [place] with SCC of the cervix. Initially thought to be vaginal cancer, but later confirmed to have cervical involvement, thus making it a primary cervical cancer  2. 2017 Started ChemoRT at [facility]  3. 6/15/17: Keegan Brachytherapy at [facility].     Since completing treatment she overall feels well. Still fatigued. Some LUQ pain that is mild. No bleeding. Bladder and bowels working well. No post-treatment imaging.       Review of Systems:  Systemic:No weight changes.    Skin : No skin changes or new lesions.   Eye : No changes in vision.   Pulmonary: No cough or SOB.   Cardiovascular: No CP or palpitations  Gastrointestinal : No diarrhea, constipation, abdominal pain. Bowel habits normal.   Genitourinary: No dysuria, urgency or bleeding  Psychiatric: No depression or anxiety  Hematologic : No palpable lymph nodes.   Endocrine : No hot flashes. No heat/cold intolerance.      Neurological: No headaches, no numbness.     Past Medical History:   Diagnosis Date     Asthma      Cervical cancer (H)      Congestive heart failure (CHF) (H)      Hyperlipidemia      Hypertension      Lung nodule     Long standing Rt apical lung nodule     MI (myocardial infarction) (H)     involving anterior  wall        Past Surgical History:   Procedure Laterality Date     BIOPSY OF VAGINA,SIMPLE  02/2017    also bx from 2012,      COLONOSCOPY       HEART CATH STENT COR W/WO PTCA  2009     INSERT INTERSTITIAL NEEDLE, ULTRASOUND GUIDED N/A 6/13/2017    Procedure: INSERT INTERSTITIAL NEEDLE, ULTRASOUND GUIDED;  Insertion Interstitial Needle with Ultrasound Guidance Anesthesia Block;  Surgeon: Nicole Ward MD;  Location: [facility] OR     REMOVE RADIATION NEEDLES CERVIX N/A 6/15/2017    Procedure: REMOVE RADIATION NEEDLES CERVIX;  Removal of Radiation Needles and Keegan Template Cervix ;  Surgeon: Michelle Carrion MD;  Location: [facility] OR        OBGYN history and Health Maintenance:  GP  Last Mamogram:  Last Colonoscopy:     Current Outpatient Prescriptions   Medication Sig Dispense Refill     acetaminophen (TYLENOL) 325 MG tablet Take 2 tablets (650 mg) by mouth every 4 hours as needed for mild pain or fever 100 tablet 0     loperamide (IMODIUM) 2 MG capsule Take 2 mg by mouth 4 times daily as needed for diarrhea       lisinopril (PRINIVIL/ZESTRIL) 5 MG tablet Take 5 mg by mouth 2 times daily Reported on 5/5/2017       LORazepam (ATIVAN) 1 MG tablet Take 1 tablet (1 mg) by mouth every 6 hours as needed (nausea/vomiting, anxiety or sleep ) 30 tablet 1     prochlorperazine (COMPAZINE) 10 MG tablet Take 1 tablet (10 mg) by mouth every 6 hours as needed (nausea/vomiting) 30 tablet 2     Montelukast Sodium (SINGULAIR PO) Take 10 mg by mouth every evening Reported on 5/16/2017       Carvedilol (COREG PO) Take 6.25 mg by mouth 2 times daily       albuterol (PROAIR HFA/PROVENTIL HFA/VENTOLIN HFA) 108 (90 BASE) MCG/ACT Inhaler Inhale 2 puffs into the lungs every 6 hours as needed Reported on 4/20/2017       Multiple Vitamins-Minerals (MULTIVITAMIN ADULT PO) Take by mouth every morning        ASPIRIN PO Take 81 mg by mouth daily (with dinner)        hydrochlorothiazide (HYDRODIURIL) 25 MG tablet daily On hold for now 5/17/17, Reported  on 5/16/2017       SIMVASTATIN PO Take 40 mg by mouth once On hold 5/17/17 per Gyn onc  Reported on 5/16/2017              Allergies   Allergen Reactions     Amoxicillin Rash        Social History:  Social History   Substance Use Topics     Smoking status: Never Smoker     Smokeless tobacco: Never Used     Alcohol use Yes      Comment: social     Work: Retired  Ethnicity identification: White  Preferred language: English  Lives at home with: George, her     Family History:     Family History   Problem Relation Age of Onset     CANCER Other      Daughter had Breast Cancer at age 39         Physical Exam:     /72 (BP Location: Left arm, Patient Position: Chair, Cuff Size: Adult Regular)  Pulse 74  Temp 98.2  F (36.8  C) (Oral)  Resp 16  Wt 64.5 kg (142 lb 3.2 oz)  SpO2 97%  BMI 23.66 kg/m2  Body mass index is 23.66 kg/(m^2).    General: Alert and oriented, no acute distress. Thin. Good color and well appearing  Psych: Mood stable  Cardiovascular: RRR, no murmors  Pulmonary: Lungs clear . Normal respiratory effort  GI: No distention. No masses. No hernia.   Lymph: No enlarge lymph nodes in neck or groin  : Atrophic  external genitalia. Narrow vagina. Obliterated cervix. No tumor. No pelvic masses    ECOG PS = 0        Pretreatment PET 4/2017:    Combined Report of:    PET and CT on  4/20/2017 12:06 PM :     1. PET of the neck, chest, abdomen, and pelvis.  2. PET CT Fusion for Attenuation Correction and Anatomical  Localization:    3. Diagnostic CT scan of the chest, abdomen, and pelvis with  intravenous contrast for interpretation.  3. CT of the chest, abdomen and pelvis obtained for diagnostic  interpretation.  4. 3D MIP and PET-CT fused images were processed on an independent  workstation and archived to PACS and reviewed by a radiologist.     Technique:     1. PET: The patient received 15.3 mCi of F-18-FDG; the serum glucose  was 132 prior to administration, body weight was 66.7 kg. Images  were  evaluated in the axial, sagittal, and coronal planes as well as the  rotational whole body MIP. Images were acquired from the Vertex to the  Feet.     UPTAKE WAS MEASURED AT 60 MINUTES.      2. CT: Volumetric acquisition for clinical interpretation of the  chest, abdomen, and pelvis acquired at 3 mm sections . The chest,  abdomen, and pelvis were evaluated at 5 mm sections in bone, soft  tissue, and lung windows.       The patient received 70 cc. Of Isovue 370 intravenously for the  examination.  Additional imaging was performed after administration of  40 mg IV Lasix.  --     3. 3D MIP and PET-CT fused images were processed on an independent  workstation and archived to PACS and reviewed by a radiologist.     INDICATION: Malignant neoplasm of exocervix     ADDITIONAL INFORMATION OBTAINED FROM EMR: none     COMPARISON: MRI pelvis 4/19/2017, CT chest abdomen pelvis 4/13/2017.     FINDINGS:      HEAD/NECK:  There is no  suspicious FDG uptake in the neck. The paranasal sinuses  and mastoid air cells are clear. The mucosal pharyngeal space, the  , prevertebral and carotid spaces are within normal limits.  No masses, mass effect or pathologically enlarged lymph nodes are  evident. The thyroid gland is unremarkable.     CHEST:  There is no suspicious FDG uptake in the chest.   The heart, specifically the right atrium mildly enlarged. No  significant pericardial effusion. Aorta and main pulmonary artery are  normal in caliber. Scattered atherosclerotic calcifications in the  coronary arteries and aortic arch. There are no pathologically  enlarged mediastinal, hilar or axillary lymph nodes.      Biapical pleural thickening. Nodular thickening in the right lung  apex, including 7 mm nodule (image 25 series 10) and 8mm subpleural  nodule in the right lung apex (image 18 series 10), without associated  increased FDG uptake. No additional suspicious lung nodules  identified. No evidence of infection,  pneumothorax or pleural  effusion. Scattered calcified granulomas.     ABDOMEN AND PELVIS:  Hypermetabolic mass (SUV max of 16.8) arising from the right upper  cervix extending into and involving the upper vagina, better  characterized on 2017 MRI. There are multiple hypermetabolic  lymph nodes in the pelvis and retroperitoneum includin mm lymph node in the right internal iliac chain (image 94 series 6)  with SUV max of 3.9.   8 mm lymph node in the right common iliac chain (image 84 series 6)  with SUV max of 3.9.   7 mm left external iliac chain lymph node (image 97 series 6) with SUV  max of 3.4.     Mild increased FDG uptake in the adrenal glands, without associated  discrete nodules identified, likely physiologic stress response.  Liver, gallbladder, spleen, pancreas and kidneys are unremarkable.  Bladder is decompressed. Adnexa are unremarkable. There is symmetric  nephrographic renal phase without hydronephrosis. There is no evidence  for diverticulitis, bowel obstruction or free fluid.  Extensive  atherosclerotic calcifications in the abdominal aorta and major  branches with degree of narrowing in the bilateral common iliac  arteries.     LOWER EXTREMITIES:   No abnormal masses or hypermetabolic lesions.     BONES:   There are no suspicious lytic or blastic osseous lesions.  Dextrocurvature of the lumbar spine with associated degenerative  changes. There is no abnormal FDG uptake in the skeleton.    IMPRESSION: In this patient with malignant neoplasm of exocervix:  1. Cervical cancer arising from the right upper cervix, involving the  upper vagina corresponds to known malignancy, better characterized on  2017.  2. There are several mildly hypermetabolic lymph nodes in the pelvis  including right internal iliac chain, right common iliac chain and  left external iliac chain which he be treated as metastatic disease  under proven otherwise.   3. Nodular thickening in the right lung apex, likely  presents fibrosis  or scarring. Recommend follow-up chest CT to ensure stability.     I have personally reviewed the examination and initial interpretation  and I agree with the findings.     GABRIELA GREGG MD         Assessment:    Savannah Camacho is a 77 year old woman with IIB cervical cancer      Plan:     1.)   Cervical cancer: AVIS. Has not had post-treatment imaging. Discussed recurrence risk (30-50%) given locally advanced with positive nodes. Discussed no standard of care for further adjuvant therapy. Did discuss [id number] at length today.       -AVIS on exam  -She is considering clinical trial. Info given. Reviewed consent form today. Study team to contact her next week about decision  -If no clinical trial, would plan PET and followup per routine     2.) Genetic risk factors were assessed: n/A    3.) Labs and/or tests ordered include:  None today.           Kalee Lee MD    Department of Ob/Gyn and Women's Health  Division of Gynecologic Oncology  [facility]  [phone]

## 2017-07-21 NOTE — NURSING NOTE
"FOLLOW-UP VISIT    Patient Name: Savannah Camacho      : 1940     Age: 77 year old        ______________________________________________________________________________     Chief Complaint   Patient presents with     Cancer     F/U for radiaiton to the pelvis     /69  Pulse 72  Wt 64.5 kg (142 lb 4.8 oz)  SpO2 98%  BMI 23.68 kg/m2     Date Radiation Completed: Cervical cancer: Pelvis 4500 cGy completed on 17, Brachytherapy x 5 2250 cGy completed on 6/15/17    Pain  Denies    Labs  Other Labs: No    Imaging  None        Diarrhea: 0- None    Constipation: 0- None    Nausea: 0- None    Vomitin- No vomiting    Genitourinary system: 0- Normal    Dysuria: 0- None    Vaginal dilator use: Not using at this time but will start.  Had pelvic exam at GYN clinic and was \"a little uncomfortable\"      Other Appointments: Yes    MD Name: Dr. Lee Appointment Date: today   MD Name: Appointment Date:   MD Name: Appointment Date:   Other Appointment Notes:     Residual Radiation side effect: Energy level coming back, skin healing well. Vaginal discharge clear without odor.     Additional Instructions:           "

## 2017-07-21 NOTE — LETTER
2017       RE: Savannah Camacho  [address] [facility] [address]     Dear Colleague,    Thank you for referring your patient, Savannah Camacho, to the RAD[facility]ATION ONCOLOGY CLINIC. Please see a copy of my visit note below.    [facility], [place]  Radiation Oncology Follow-up Note  2017    Savannah Camacho   MRN: [id number]  : 1940     DISEASE TREATED:  Grade 1 squamous cell carcinoma of cervix, FIGO stage IIB.     INTERVAL SINCE COMPLETION OF RADIATION THERAPY: 1 month; completed treatment on 6/15/17.     TYPE OF RADIATION THERAPY ADMINISTERED: 4500 cGy in 25 fractions to pelvis followed by 2250 cGy in 5 fractions interstitial brachytherapy boost     SUBJECTIVE:   Ms. 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 She had a history of vaginal discharge in this area dating back to  at which time the physical exam demonstrated a plaque in the right upper vagina which was biopsied and showed only hyperkeratosis with no dysplasia. More recently, she developed increasing vaginal discharge which she described as brown and foul-smelling. Physical exam demonstrated a plaque in the anterior vaginal wall with extension into the right fornix. EUA showed proximal vaginal thickening of both the anterior and posterior vaginal walls, primarily involving the right side with narrowing of the proximal vaginal canal. Biopsies were taken and were positive for well-differentiated squamous cell carcinoma. The pelvic MRI demonstrated a large mass arising from the upper vagina on the right side with involvement of the right lateral cervix, posterior proximal vaginal wall, and possible involvement of the mid rectum. There was also parametrial involvement on the right. Given the MRI finding, she was felt to have a cervical primary with vaginal extension instead of a vaginal primary.  She was treated in our department with combined chemoradiation  with cisplatin to the pelvis, followed by an interstitial HDR brachytherapy boost as described above. She tolerated treatment well with only grade 1 diarrhea and grade 1 fatigue.    On exam today Mr. Camacho reports that she is overall doing well. She reports that she had some diarrhea last night but believes this was food related and has otherwise been having normal bowel movements. She has no urinary side effects. She reports that she still has some mild fatigue but her energy has been improving since the completion of treatment. She denies vaginal bleeding but doesn't Dors small amounts of clear odorless vaginal discharge. She saw Dr. Lee of gynecologic oncology prior to this appointment. Pelvic exam was performed and showed AVIS. The patient reports that she has not been using her dilator because she was unsure if it was okay to do so yet, and she therefore reports some mild discomfort with her pelvic exam. Complete review of systems is otherwise unremarkable.     OBJECTIVE:   /69  Pulse 72  Wt 64.5 kg (142 lb 4.8 oz)  SpO2 98%  BMI 23.68 kg/m2  GENERAL:  Appears well, in no acute distress.   HEENT: NCAT, EOMI  RESP: Breathing comfortably on RA  CV: WWP  PELVIS: Mild vaginal stenosis. Cervix is visualized and healthy with some radiation changes though there are no fornices surrounding the cervix. Vaginal canal shows no lesions. No bleeding or discharge noted.  NEURO: CN grossly intact. 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 She has recovered appropriately from the acute toxicity of treatment at this time and has a good response by pelvic exam.     RECOMMENDATIONS:    1. PET scan per gyn onc  2. Follow up in rad onc prn    The patient was seen and discussed with staff, Dr. Carrion.      Bobby Troy MD  Resident, PGY-3  Department of Radiation  Oncology  [facility]  [phone]      I saw and examined the patient with the resident.  I have reviewed and agree with the resident's note and plan of care.      Michelle Carrion MD    CC  Patient Care Team:  Adán Lanier MD as PCP - General (Family Practice)  MD Janet Alston MD

## 2017-07-21 NOTE — PROGRESS NOTES
GYN Oncology Followup    Referring provider:    Adán Lanier MD  [address] [place] [address]   RE: Savannah Camacho  : 1940  FEMI: 2017    CC: Followup cervical cancer, stage IIB with positive nodes.     HPI: Ms Savannah Camacho is a 77 year old year old White  female who presents for followup of her cervical cancer. She is here today with her .    Treatment history:   2014: ASCUS pap. No further workup  2017: Vaginal discharge.   3/2017: EUA in [place] with SCC of the cervix. Initially thought to be vaginal cancer, but later confirmed to have cervical involvement, thus making it a primary cervical cancer  2. 2017 Started ChemoRT at [facility]  3. 6/15/17: Keegan Brachytherapy at [facility].     Since completing treatment she overall feels well. Still fatigued. Some LUQ pain that is mild. No bleeding. Bladder and bowels working well. No post-treatment imaging.       Review of Systems:  Systemic:No weight changes.    Skin : No skin changes or new lesions.   Eye : No changes in vision.   Pulmonary: No cough or SOB.   Cardiovascular: No CP or palpitations  Gastrointestinal : No diarrhea, constipation, abdominal pain. Bowel habits normal.   Genitourinary: No dysuria, urgency or bleeding  Psychiatric: No depression or anxiety  Hematologic : No palpable lymph nodes.   Endocrine : No hot flashes. No heat/cold intolerance.      Neurological: No headaches, no numbness.     Past Medical History:   Diagnosis Date     Asthma      Cervical cancer (H)      Congestive heart failure (CHF) (H)      Hyperlipidemia      Hypertension      Lung nodule     Long standing Rt apical lung nodule     MI (myocardial infarction) (H)     involving anterior wall        Past Surgical History:   Procedure Laterality Date     BIOPSY OF VAGINA,SIMPLE  2017    also bx from ,      COLONOSCOPY       HEART CATH STENT COR W/WO PTCA       INSERT INTERSTITIAL NEEDLE, ULTRASOUND GUIDED N/A  6/13/2017    Procedure: INSERT INTERSTITIAL NEEDLE, ULTRASOUND GUIDED;  Insertion Interstitial Needle with Ultrasound Guidance Anesthesia Block;  Surgeon: Nicole Ward MD;  Location: [facility] OR     REMOVE RADIATION NEEDLES CERVIX N/A 6/15/2017    Procedure: REMOVE RADIATION NEEDLES CERVIX;  Removal of Radiation Needles and Keegan Template Cervix ;  Surgeon: Michelle Carrion MD;  Location: [facility] OR        OBGYN history and Health Maintenance:  GP  Last Mamogram:  Last Colonoscopy:     Current Outpatient Prescriptions   Medication Sig Dispense Refill     acetaminophen (TYLENOL) 325 MG tablet Take 2 tablets (650 mg) by mouth every 4 hours as needed for mild pain or fever 100 tablet 0     loperamide (IMODIUM) 2 MG capsule Take 2 mg by mouth 4 times daily as needed for diarrhea       lisinopril (PRINIVIL/ZESTRIL) 5 MG tablet Take 5 mg by mouth 2 times daily Reported on 5/5/2017       LORazepam (ATIVAN) 1 MG tablet Take 1 tablet (1 mg) by mouth every 6 hours as needed (nausea/vomiting, anxiety or sleep ) 30 tablet 1     prochlorperazine (COMPAZINE) 10 MG tablet Take 1 tablet (10 mg) by mouth every 6 hours as needed (nausea/vomiting) 30 tablet 2     Montelukast Sodium (SINGULAIR PO) Take 10 mg by mouth every evening Reported on 5/16/2017       Carvedilol (COREG PO) Take 6.25 mg by mouth 2 times daily       albuterol (PROAIR HFA/PROVENTIL HFA/VENTOLIN HFA) 108 (90 BASE) MCG/ACT Inhaler Inhale 2 puffs into the lungs every 6 hours as needed Reported on 4/20/2017       Multiple Vitamins-Minerals (MULTIVITAMIN ADULT PO) Take by mouth every morning        ASPIRIN PO Take 81 mg by mouth daily (with dinner)        hydrochlorothiazide (HYDRODIURIL) 25 MG tablet daily On hold for now 5/17/17, Reported on 5/16/2017       SIMVASTATIN PO Take 40 mg by mouth once On hold 5/17/17 per Gyn onc  Reported on 5/16/2017              Allergies   Allergen Reactions     Amoxicillin Rash        Social History:  Social History   Substance Use Topics      Smoking status: Never Smoker     Smokeless tobacco: Never Used     Alcohol use Yes      Comment: social     Work: Retired  Ethnicity identification: White  Preferred language: English  Lives at home with: George, her     Family History:     Family History   Problem Relation Age of Onset     CANCER Other      Daughter had Breast Cancer at age 39         Physical Exam:     /72 (BP Location: Left arm, Patient Position: Chair, Cuff Size: Adult Regular)  Pulse 74  Temp 98.2  F (36.8  C) (Oral)  Resp 16  Wt 64.5 kg (142 lb 3.2 oz)  SpO2 97%  BMI 23.66 kg/m2  Body mass index is 23.66 kg/(m^2).    General: Alert and oriented, no acute distress. Thin. Good color and well appearing  Psych: Mood stable  Cardiovascular: RRR, no murmors  Pulmonary: Lungs clear . Normal respiratory effort  GI: No distention. No masses. No hernia.   Lymph: No enlarge lymph nodes in neck or groin  : Atrophic  external genitalia. Narrow vagina. Obliterated cervix. No tumor. No pelvic masses    ECOG PS = 0        Pretreatment PET 4/2017:    Combined Report of:    PET and CT on  4/20/2017 12:06 PM :     1. PET of the neck, chest, abdomen, and pelvis.  2. PET CT Fusion for Attenuation Correction and Anatomical  Localization:    3. Diagnostic CT scan of the chest, abdomen, and pelvis with  intravenous contrast for interpretation.  3. CT of the chest, abdomen and pelvis obtained for diagnostic  interpretation.  4. 3D MIP and PET-CT fused images were processed on an independent  workstation and archived to PACS and reviewed by a radiologist.     Technique:     1. PET: The patient received 15.3 mCi of F-18-FDG; the serum glucose  was 132 prior to administration, body weight was 66.7 kg. Images were  evaluated in the axial, sagittal, and coronal planes as well as the  rotational whole body MIP. Images were acquired from the Vertex to the  Feet.     UPTAKE WAS MEASURED AT 60 MINUTES.      2. CT: Volumetric acquisition for clinical  interpretation of the  chest, abdomen, and pelvis acquired at 3 mm sections . The chest,  abdomen, and pelvis were evaluated at 5 mm sections in bone, soft  tissue, and lung windows.       The patient received 70 cc. Of Isovue 370 intravenously for the  examination.  Additional imaging was performed after administration of  40 mg IV Lasix.  --     3. 3D MIP and PET-CT fused images were processed on an independent  workstation and archived to PACS and reviewed by a radiologist.     INDICATION: Malignant neoplasm of exocervix     ADDITIONAL INFORMATION OBTAINED FROM EMR: none     COMPARISON: MRI pelvis 4/19/2017, CT chest abdomen pelvis 4/13/2017.     FINDINGS:      HEAD/NECK:  There is no  suspicious FDG uptake in the neck. The paranasal sinuses  and mastoid air cells are clear. The mucosal pharyngeal space, the  , prevertebral and carotid spaces are within normal limits.  No masses, mass effect or pathologically enlarged lymph nodes are  evident. The thyroid gland is unremarkable.     CHEST:  There is no suspicious FDG uptake in the chest.   The heart, specifically the right atrium mildly enlarged. No  significant pericardial effusion. Aorta and main pulmonary artery are  normal in caliber. Scattered atherosclerotic calcifications in the  coronary arteries and aortic arch. There are no pathologically  enlarged mediastinal, hilar or axillary lymph nodes.      Biapical pleural thickening. Nodular thickening in the right lung  apex, including 7 mm nodule (image 25 series 10) and 8mm subpleural  nodule in the right lung apex (image 18 series 10), without associated  increased FDG uptake. No additional suspicious lung nodules  identified. No evidence of infection, pneumothorax or pleural  effusion. Scattered calcified granulomas.     ABDOMEN AND PELVIS:  Hypermetabolic mass (SUV max of 16.8) arising from the right upper  cervix extending into and involving the upper vagina, better  characterized on 4/19/2017  MRI. There are multiple hypermetabolic  lymph nodes in the pelvis and retroperitoneum includin mm lymph node in the right internal iliac chain (image 94 series 6)  with SUV max of 3.9.   8 mm lymph node in the right common iliac chain (image 84 series 6)  with SUV max of 3.9.   7 mm left external iliac chain lymph node (image 97 series 6) with SUV  max of 3.4.     Mild increased FDG uptake in the adrenal glands, without associated  discrete nodules identified, likely physiologic stress response.  Liver, gallbladder, spleen, pancreas and kidneys are unremarkable.  Bladder is decompressed. Adnexa are unremarkable. There is symmetric  nephrographic renal phase without hydronephrosis. There is no evidence  for diverticulitis, bowel obstruction or free fluid.  Extensive  atherosclerotic calcifications in the abdominal aorta and major  branches with degree of narrowing in the bilateral common iliac  arteries.     LOWER EXTREMITIES:   No abnormal masses or hypermetabolic lesions.     BONES:   There are no suspicious lytic or blastic osseous lesions.  Dextrocurvature of the lumbar spine with associated degenerative  changes. There is no abnormal FDG uptake in the skeleton.    IMPRESSION: In this patient with malignant neoplasm of exocervix:  1. Cervical cancer arising from the right upper cervix, involving the  upper vagina corresponds to known malignancy, better characterized on  2017.  2. There are several mildly hypermetabolic lymph nodes in the pelvis  including right internal iliac chain, right common iliac chain and  left external iliac chain which he be treated as metastatic disease  under proven otherwise.   3. Nodular thickening in the right lung apex, likely presents fibrosis  or scarring. Recommend follow-up chest CT to ensure stability.     I have personally reviewed the examination and initial interpretation  and I agree with the findings.     GABRIELA GREGG MD         Assessment:    Savannah BURR  Doug is a 77 year old woman with IIB cervical cancer      Plan:     1.)   Cervical cancer: AVIS. Has not had post-treatment imaging. Discussed recurrence risk (30-50%) given locally advanced with positive nodes. Discussed no standard of care for further adjuvant therapy. Did discuss [id number] at length today.       -AVIS on exam  -She is considering clinical trial. Info given. Reviewed consent form today. Study team to contact her next week about decision  -If no clinical trial, would plan PET and followup per routine     2.) Genetic risk factors were assessed: n/A    3.) Labs and/or tests ordered include:  None today.           Kalee Lee MD    Department of Ob/Gyn and Women's Health  Division of Gynecologic Oncology  [facility]  [phone]

## 2017-07-21 NOTE — Clinical Note
Savannah Martinez looked good today. AVIS on my exam. She is considering clinical trial enrollment ([id number] maintenance therapy trial), which would affect her surveillance. I will let you know what she decides! Thanks, Kalee

## 2017-07-21 NOTE — MR AVS SNAPSHOT
After Visit Summary   2017    Savannah Camacho    MRN: [id number]           Patient Information     Date Of Birth          1940        Visit Information        Provider Department      2017 3:00 PM Michelle Carrion MD Radiation Oncology Clinic        Today's Diagnoses     Malignant neoplasm of exocervix (H)    -  1       Follow-ups after your visit        Who to contact     Please call your clinic at [phone] to:    Ask questions about your health    Make or cancel appointments    Discuss your medicines    Learn about your test results    Speak to your doctor   If you have compliments or concerns about an experience at your clinic, or if you wish to file a complaint, please contact [facility] Patient Relations at [phone] or email us at [email or web address]         Additional Information About Your Visit        MyChart Information     Let[employer] Talk is an electronic gateway that provides easy, online access to your medical records. With [employer], you can request a clinic appointment, read your test results, renew a prescription or communicate with your care team.     To sign up for [email or web address] visit the website at [email or web address]   You will be asked to enter the access code listed below, as well as some personal information. Please follow the directions to create your username and password.     Your access code is: [email or web address]  Expires: 10/19/2017  6:26 PM     Your access code will  in 90 days. If you need help or a new code, please contact your [facility] Clinic or call [phone] for assistance.        Care EveryWhere ID     This is your Care EveryWhere ID. This could be used by other organizations to access your [facility] medical records  [id number]        Your Vitals Were     Pulse Pulse Oximetry BMI (Body Mass Index)             72 98% 23.68 kg/m2          Blood Pressure from Last 3 Encounters:   17  148/69   07/21/17 116/72   06/15/17 134/69    Weight from Last 3 Encounters:   07/21/17 64.5 kg (142 lb 4.8 oz)   07/21/17 64.5 kg (142 lb 3.2 oz)   06/13/17 63.7 kg (140 lb 6.9 oz)              Today, you had the following     No orders found for display       Primary Care Provider Office Phone # Fax #    Adán Lanier [phone]       [place] [address] [address] [facility] [address]        Equal Access to Services     [facility]: Hadii aad ku hadasho Soomaali, waaxda luqadaha, qaybta kaalmada aderubio, zunilda bullock . So [facility] [phone].    ATENCIÓN: Si habla español, tiene a ordonez disposición servicios gratuitos de asistencia lingüística. [facility] [phone].    We comply with applicable federal civil rights laws and [place] laws. We do not discriminate on the basis of race, color, national origin, age, disability sex, sexual orientation or gender identity.            Thank you!     Thank you for choosing [facility]COL[facility]Y CLINIC  for your care. Our goal is always to provide you with excellent care. Hearing back from our patients is one way we can continue to improve our services. Please take a few minutes to complete the written survey that you may receive in the mail after your visit with us. Thank you!             Your Updated Medication List - Protect others around you: Learn how to safely use, store and throw away your medicines at www.disposemymeds.org.          This list is accurate as of: 7/21/17 11:59 PM.  Always use your most recent med list.                   Brand Name Dispense Instructions for use Diagnosis    acetaminophen 325 MG tablet    TYLENOL    100 tablet    Take 2 tablets (650 mg) by mouth every 4 hours as needed for mild pain or fever    Malignant neoplasm of exocervix (H)       albuterol 108 (90 BASE) MCG/ACT Inhaler    PROAIR HFA/PROVENTIL HFA/VENTOLIN HFA     Inhale 2 puffs into the lungs every 6 hours as needed Reported  on 4/20/2017        ASPIRIN PO      Take 81 mg by mouth daily (with dinner)        COREG PO      Take 6.25 mg by mouth 2 times daily        hydrochlorothiazide 25 MG tablet    HYDRODIURIL     daily On hold for now 5/17/17, Reported on 5/16/2017        lisinopril 5 MG tablet    PRINIVIL/ZESTRIL     Take 5 mg by mouth 2 times daily Reported on 5/5/2017        loperamide 2 MG capsule    IMODIUM     Take 2 mg by mouth 4 times daily as needed for diarrhea        LORazepam 1 MG tablet    ATIVAN    30 tablet    Take 1 tablet (1 mg) by mouth every 6 hours as needed (nausea/vomiting, anxiety or sleep )    Malignant neoplasm of exocervix (H)       MULTIVITAMIN ADULT PO      Take by mouth every morning        prochlorperazine 10 MG tablet    COMPAZINE    30 tablet    Take 1 tablet (10 mg) by mouth every 6 hours as needed (nausea/vomiting)    Malignant neoplasm of exocervix (H)       SIMVASTATIN PO      Take 40 mg by mouth once On hold 5/17/17 per Gyn onc  Reported on 5/16/2017        SINGULAIR PO      Take 10 mg by mouth every evening Reported on 5/16/2017

## 2017-07-21 NOTE — MR AVS SNAPSHOT
"              After Visit Summary   2017    Savannah Camacho    MRN: [id number]           Patient Information     Date Of Birth          1940        Visit Information        Provider Department      2017 2:00 PM Kalee Lee MD [facility]        Today's Diagnoses     Malignant neoplasm of exocervix (H)    -  1       Follow-ups after your visit        Who to contact     If you have questions or need follow up information about today's clinic visit or your schedule please contact [facility] directly at [phone].  Normal or non-critical lab and imaging results will be communicated to you by [employer], letter or phone within 4 business days after the clinic has received the results. If you do not hear from us within 7 days, please contact the clinic through Silvigenhart or phone. If you have a critical or abnormal lab result, we will notify you by phone as soon as possible.  Submit refill requests through [employer] or call your pharmacy and they will forward the refill request to us. Please allow 3 business days for your refill to be completed.          Additional Information About Your Visit        MyChart Information     [employer] lets you send messages to your doctor, view your test results, renew your prescriptions, schedule appointments and more. To sign up, go to [email or web address] . Click on \"Log in\" on the left side of the screen, which will take you to the Welcome page. Then click on \"Sign up Now\" on the right side of the page.     You will be asked to enter the access code listed below, as well as some personal information. Please follow the directions to create your username and password.     Your access code is: [email or web address]  Expires: 10/19/2017  6:26 PM     Your access code will  in 90 days. If you need help or a new code, please call your [place] clinic or [phone].        Care EveryWhere ID     This is your Care EveryWhere ID. This could " be used by other organizations to access your [facility] medical records  [id number]        Your Vitals Were     Pulse Temperature Respirations Pulse Oximetry BMI (Body Mass Index)       74 98.2  F (36.8  C) (Oral) 16 97% 23.66 kg/m2        Blood Pressure from Last 3 Encounters:   07/21/17 148/69   07/21/17 116/72   06/15/17 134/69    Weight from Last 3 Encounters:   07/21/17 64.5 kg (142 lb 4.8 oz)   07/21/17 64.5 kg (142 lb 3.2 oz)   06/13/17 63.7 kg (140 lb 6.9 oz)              Today, you had the following     No orders found for display       Primary Care Provider Office Phone # Fax #    Adán Lanier [phone]       [place] [address] [address] [facility] [address]        Equal Access to Services     [facility]: Hadii carlos hogan hadasho Soomaali, waaxda luqadaha, qaybta kaalmada adeegyada, zunilda bullock . So [facility] [phone].    ATENCIÓN: Si habla español, tiene a ordonez disposición servicios gratuitos de asistencia lingüística. Denys al [phone].    We comply with applicable federal civil rights laws and [place] laws. We do not discriminate on the basis of race, color, national origin, age, disability sex, sexual orientation or gender identity.            Thank you!     Thank you for choosing [facility]  for your care. Our goal is always to provide you with excellent care. Hearing back from our patients is one way we can continue to improve our services. Please take a few minutes to complete the written survey that you may receive in the mail after your visit with us. Thank you!             Your Updated Medication List - Protect others around you: Learn how to safely use, store and throw away your medicines at www.disposemymeds.org.          This list is accurate as of: 7/21/17  6:26 PM.  Always use your most recent med list.                   Brand Name Dispense Instructions for use Diagnosis    acetaminophen 325 MG tablet     TYLENOL    100 tablet    Take 2 tablets (650 mg) by mouth every 4 hours as needed for mild pain or fever    Malignant neoplasm of exocervix (H)       albuterol 108 (90 BASE) MCG/ACT Inhaler    PROAIR HFA/PROVENTIL HFA/VENTOLIN HFA     Inhale 2 puffs into the lungs every 6 hours as needed Reported on 4/20/2017        ASPIRIN PO      Take 81 mg by mouth daily (with dinner)        COREG PO      Take 6.25 mg by mouth 2 times daily        hydrochlorothiazide 25 MG tablet    HYDRODIURIL     daily On hold for now 5/17/17, Reported on 5/16/2017        lisinopril 5 MG tablet    PRINIVIL/ZESTRIL     Take 5 mg by mouth 2 times daily Reported on 5/5/2017        loperamide 2 MG capsule    IMODIUM     Take 2 mg by mouth 4 times daily as needed for diarrhea        LORazepam 1 MG tablet    ATIVAN    30 tablet    Take 1 tablet (1 mg) by mouth every 6 hours as needed (nausea/vomiting, anxiety or sleep )    Malignant neoplasm of exocervix (H)       MULTIVITAMIN ADULT PO      Take by mouth every morning        prochlorperazine 10 MG tablet    COMPAZINE    30 tablet    Take 1 tablet (10 mg) by mouth every 6 hours as needed (nausea/vomiting)    Malignant neoplasm of exocervix (H)       SIMVASTATIN PO      Take 40 mg by mouth once On hold 5/17/17 per Gyn onc  Reported on 5/16/2017        SINGULAIR PO      Take 10 mg by mouth every evening Reported on 5/16/2017

## 2017-07-24 NOTE — PROGRESS NOTES
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     INTERVAL SINCE COMPLETION OF RADIATION THERAPY: 1 month; completed treatment on 6/15/17.     TYPE OF RADIATION THERAPY ADMINISTERED: 4500 cGy in 25 fractions to pelvis followed by 2250 cGy in 5 fractions interstitial brachytherapy boost     SUBJECTIVE:   Ms. 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 She had a history of vaginal discharge in this area dating back to  at which time the physical exam demonstrated a plaque in the right upper vagina which was biopsied and showed only hyperkeratosis with no dysplasia. More recently, she developed increasing vaginal discharge which she described as brown and foul-smelling. Physical exam demonstrated a plaque in the anterior vaginal wall with extension into the right fornix. EUA showed proximal vaginal thickening of both the anterior and posterior vaginal walls, primarily involving the right side with narrowing of the proximal vaginal canal. Biopsies were taken and were positive for well-differentiated squamous cell carcinoma. The pelvic MRI demonstrated a large mass arising from the upper vagina on the right side with involvement of the right lateral cervix, posterior proximal vaginal wall, and possible involvement of the mid rectum. There was also parametrial involvement on the right. Given the MRI finding, she was felt to have a cervical primary with vaginal extension instead of a vaginal primary.  She was treated in our department with combined chemoradiation with cisplatin to the pelvis, followed by an interstitial HDR brachytherapy boost as described above. She tolerated treatment well with only grade 1 diarrhea and grade 1 fatigue.    On exam today Mr. Camacho reports that she is overall  doing well. She reports that she had some diarrhea last night but believes this was food related and has otherwise been having normal bowel movements. She has no urinary side effects. She reports that she still has some mild fatigue but her energy has been improving since the completion of treatment. She denies vaginal bleeding but doesn't Dors small amounts of clear odorless vaginal discharge. She saw Dr. Lee of gynecologic oncology prior to this appointment. Pelvic exam was performed and showed AVIS. The patient reports that she has not been using her dilator because she was unsure if it was okay to do so yet, and she therefore reports some mild discomfort with her pelvic exam. Complete review of systems is otherwise unremarkable.     OBJECTIVE:   /69  Pulse 72  Wt 64.5 kg (142 lb 4.8 oz)  SpO2 98%  BMI 23.68 kg/m2  GENERAL:  Appears well, in no acute distress.   HEENT: NCAT, EOMI  RESP: Breathing comfortably on RA  CV: WWP  PELVIS: Mild vaginal stenosis. Cervix is visualized and healthy with some radiation changes though there are no fornices surrounding the cervix. Vaginal canal shows no lesions. No bleeding or discharge noted.  NEURO: CN grossly intact. 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 She has recovered appropriately from the acute toxicity of treatment at this time and has a good response by pelvic exam.     RECOMMENDATIONS:    1. PET scan per gyn onc  2. Follow up in rad onc prn    The patient was seen and discussed with staff, Dr. Carrion.      Bobby Troy MD  Resident, PGY-3  Department of Radiation Oncology  [facility]  [phone]      I saw and examined the patient with the resident.  I have reviewed and agree with the resident's note and plan of care.      Michelle Carrion MD    CC  Patient Care Team:  Adán Lanier MD as PCP -  General (Family Practice)  NELLIE WILLIAM MD Colleen Evans, MD

## 2017-08-24 LAB — COPATH REPORT: NORMAL

## 2017-09-15 ENCOUNTER — CARE COORDINATION (OUTPATIENT)
Dept: ONCOLOGY | Facility: CLINIC | Age: 77
End: 2017-09-15

## 2017-09-15 DIAGNOSIS — C53.9 CERVIX CANCER (H): Primary | ICD-10-CM

## 2017-09-15 DIAGNOSIS — C52 VAGINAL CANCER (H): ICD-10-CM

## 2017-09-15 NOTE — PROGRESS NOTES
Care Coordinator Note   I need a follow up with Dr Raines in October.  I have decided that I do not want to do the study.  Dr Raines talked about a PET scan prior to seeing her.  Informed patient that Dr Raines does not see patients at the [facility] which is not good for her.    Then I talked with Dr Raines and she said she would see her when she is on call in October at the .  Called 12th floor and had them add her on to her calender for 10/24 at 1 PM  Will do PET scan the day before.        Patient verbalized back understanding of the above information discussed.   Shraddha VILLALOBOS, RN, OCN  Care Coordinator   Gynecologic Cancer   Office [phone]  Pager [phone]

## 2017-10-24 ENCOUNTER — ONCOLOGY VISIT (OUTPATIENT)
Dept: ONCOLOGY | Facility: CLINIC | Age: 77
End: 2017-10-24
Attending: OBSTETRICS & GYNECOLOGY
Payer: MEDICARE

## 2017-10-24 VITALS
OXYGEN SATURATION: 96 % | HEART RATE: 76 BPM | DIASTOLIC BLOOD PRESSURE: 76 MMHG | BODY MASS INDEX: 24.04 KG/M2 | SYSTOLIC BLOOD PRESSURE: 148 MMHG | TEMPERATURE: 97.7 F | HEIGHT: 65 IN | WEIGHT: 144.3 LBS | RESPIRATION RATE: 17 BRPM

## 2017-10-24 DIAGNOSIS — C53.8 MALIGNANT NEOPLASM OF OVERLAPPING SITES OF CERVIX (H): Primary | ICD-10-CM

## 2017-10-24 DIAGNOSIS — C53.8 MALIGNANT NEOPLASM OF OVERLAPPING SITES OF CERVIX (H): ICD-10-CM

## 2017-10-24 LAB
ANION GAP SERPL CALCULATED.3IONS-SCNC: 7 MMOL/L (ref 3–14)
BUN SERPL-MCNC: 24 MG/DL (ref 7–30)
CALCIUM SERPL-MCNC: 8.3 MG/DL (ref 8.5–10.1)
CHLORIDE SERPL-SCNC: 97 MMOL/L (ref 94–109)
CO2 SERPL-SCNC: 28 MMOL/L (ref 20–32)
CREAT SERPL-MCNC: 1.05 MG/DL (ref 0.52–1.04)
GFR SERPL CREATININE-BSD FRML MDRD: 51 ML/MIN/1.7M2
GLUCOSE SERPL-MCNC: 101 MG/DL (ref 70–99)
POTASSIUM SERPL-SCNC: 3.7 MMOL/L (ref 3.4–5.3)
SODIUM SERPL-SCNC: 132 MMOL/L (ref 133–144)

## 2017-10-24 PROCEDURE — 80048 BASIC METABOLIC PNL TOTAL CA: CPT | Performed by: OBSTETRICS & GYNECOLOGY

## 2017-10-24 PROCEDURE — 99213 OFFICE O/P EST LOW 20 MIN: CPT | Mod: ZP | Performed by: OBSTETRICS & GYNECOLOGY

## 2017-10-24 PROCEDURE — 36415 COLL VENOUS BLD VENIPUNCTURE: CPT

## 2017-10-24 PROCEDURE — 99212 OFFICE O/P EST SF 10 MIN: CPT | Mod: ZF

## 2017-10-24 ASSESSMENT — PAIN SCALES - GENERAL: PAINLEVEL: NO PAIN (0)

## 2017-10-24 NOTE — NURSING NOTE
Chief Complaint   Patient presents with     Blood Draw     Labs only visit. VPT labs collected in lab by LPN.     Pt tolerated procedure well. See flowsheet.    Nery Young LPN

## 2017-10-24 NOTE — LETTER
10/24/2017       RE: Savannah Camacho  [address] [facility] [place] [address]     Dear Colleague,    Thank you for referring your patient, Savannah Camacho, to the [facility]. Please see a copy of my visit note below.    Consult Notes on Referred Patient        Dr. Adán Lanier MD  [address] [place] [address]       RE: Savannah Camacho  : 1940  FEMI: 10/24/2017    HPI:  Savannah Camacho is 77 year old with stage IIA2 cervical cancer.  She is accompanied today by her .  She is feeling well.  She does have some intermittent lower abdominal pain that has been present for months and is mild and intermittent.  She does note some residual neuropathy in her feet but feels this is improving.  Denies vaginal bleeding.    Cancer Course:    She initially sought care from a gynecologist in 2012 at which time she was noted to have a plaque of her vagina which was biopsied and found to be hyperkeratosis.  She was thus treated with steroids and kenolog with substantial improvement in her symptoms.  She then had a pap smear with ASCUS, HPV negative in .  Most recently she has developed increasing, foul smelling vaginal discharge and again sought care from her gynecologist who rightfully took her to the OR for EUA and biopsies.  On EUA she was found to have a confluent area of lesions in the proximal vagina mostly confined to the anterior wall. This area measured approximately 2 x 3 cm and appeared to have a whitish appearance. There was extension into the right vaginal fornix, where there was an area of necrosis appreciated. Again, the cervix was not able to be visualized on exam. Biopsies were taken of the right vaginal wall and posterior vaginal wall. Both of these were positive for well-differentiated squamous cell carcinoma.     3/10/17:  Upper vaginal biopsy:  Well differentiated squamous cell carcinoma    17:  MRI Pelvis:  Large  exophytic heterogeneously enhancing with necrotic areas is seen arising from the upper vagina/fornix on the right side measuring 3.4 x 3.6 x 4 cm. Superiorly the lesion appears to involve the right lateral aspect of the cervix with extension into the parametrium. No extension into the cervical canal. No definite extension into the uterus. Posteriorly there is involvement of the posterior wall of the upper vagina with extension across the midline to the left side. The lesion also abuts the midportion of the rectum with possible involvement.  Thrombosis of a right parametrial venous structure is noted.  A prominent right internal iliac lymph node measures 5 mm in short axis.  Small amount of free fluid is noted in the pelvis. Visualized bowel loops otherwise appear unremarkable. Pelvic musculature is of normal appearance. No suspicious osseous lesions.    4/20/17:  PET/CT:  1.  Cervical cancer arising from the right upper cervix, involving the upper vagina corresponds to known malignancy. 2. There are several mildly hypermetabolic lymph nodes in the pelvis including right internal iliac chain, right common iliac chain and left external iliac chain which he be treated as metastatic disease under proven otherwise. 3. Nodular thickening in the right lung apex, likely presents fibrosis or scarring. Recommend follow-up chest CT to ensure stability.     5/1/17-6/15/17:  Completed chemoradiation therapy for a total dose of 67.5 Gy    10/23/17:  PET/CT:  Official report pending but per verbal read no evidence of disease      Review of Systems:  Systemic           no weight changes; no fever; no chills; no night sweats; no appetite changes  Skin           no rashes, or lesions  Eye           no irritation; no changes in vision  Marlene-Laryngeal           no dysphagia; no hoarseness   Pulmonary    no cough; no shortness of breath  Cardiovascular    no chest pain; no palpitations  Gastrointestinal    no diarrhea; no constipation;  no abdominal pain; no changes in bowel  habits; no blood in stool  Genitourinary   no urinary frequency; no urinary urgency; no dysuria; no pain; + abnormal vaginal discharge; no abnormal vaginal bleeding  Breast    no breast discharge; no breast changes; no breast pain  Musculoskeletal    no myalgias; no arthralgias; no back pain  Psychiatric           no depressed mood; no anxiety    Hematologic            no tender lymph nodes; no noticeable swellings or lumps   Endocrine    no hot flashes; no heat/cold intolerance         Neurological   no tremor; + numbness and tingling; no headaches; no difficulty sleeping    Obstetrics and Gynecology History:  ,  x 4  Menopause in early 40's, HRT for a couple years    Past Medical History:  Past Medical History:   Diagnosis Date     Asthma      Cervical cancer (H)      Congestive heart failure (CHF) (H)      Hyperlipidemia      Hypertension      Lung nodule     Long standing Rt apical lung nodule     MI (myocardial infarction)     involving anterior wall        Past Surgical History:  Past Surgical History:   Procedure Laterality Date     BIOPSY OF VAGINA,SIMPLE  2017    also bx from ,      COLONOSCOPY       HEART CATH STENT COR W/WO PTCA       INSERT INTERSTITIAL NEEDLE, ULTRASOUND GUIDED N/A 2017    Procedure: INSERT INTERSTITIAL NEEDLE, ULTRASOUND GUIDED;  Insertion Interstitial Needle with Ultrasound Guidance Anesthesia Block;  Surgeon: Nicole Ward MD;  Location: [facility] OR     REMOVE RADIATION NEEDLES CERVIX N/A 6/15/2017    Procedure: REMOVE RADIATION NEEDLES CERVIX;  Removal of Radiation Needles and Keegan Template Cervix ;  Surgeon: Michelle Carrion MD;  Location: [facility] OR       Health Maintenance:  Last Pap Smear: See HPI    Last Mammogram:               Result: normal      She has not had a history of abnormal mammograms.    Last Colonoscopy:               Result: normal-repeat 10 years      Current Medications:   has a current  "medication list which includes the following prescription(s): loperamide, hydrochlorothiazide, lisinopril, montelukast sodium, carvedilol, multiple vitamins-minerals, aspirin, acetaminophen, simvastatin, and albuterol.     Allergies:   Amoxicillin      Social History:  Social History     Social History     Marital status:      Spouse name: N/A     Number of children: N/A     Years of education: N/A     Occupational History     Not on file.     Social History Main Topics     Smoking status: Never Smoker     Smokeless tobacco: Never Used     Alcohol use Yes      Comment: social     Drug use: No     Sexual activity: Not on file     Other Topics Concern     Not on file     Social History Narrative       Lives with , feels safe at home.  Retired from a dental office.  From [employer].  Enjoys exercise, playing bridge, spending time with family and friends.  Does not have an advanced directive on file and would like her  to be her POA.  Would like full resuscitation if reversible cause is identified, however would not like to be kept on life sustaining measures long-term.     Family History:   The patient's family history is significant for.  Family History   Problem Relation Age of Onset     CANCER Other      Daughter had Breast Cancer at age 39         Physical Exam:   /76  Pulse 76  Temp 97.7  F (36.5  C) (Oral)  Resp 17  Ht 1.651 m (5' 5\")  Wt 65.5 kg (144 lb 4.8 oz)  SpO2 96%  Breastfeeding? No  BMI 24.01 kg/m2  Body mass index is 24.01 kg/(m^2).    General Appearance: healthy and alert, no distress     HEENT:  no thyromegaly, no palpable nodules or masses        Cardiovascular: regular rate and rhythm, no gallops, rubs or murmurs     Respiratory: lungs clear, no rales, rhonchi or wheezes, normal diaphragmatic excursion    Musculoskeletal: extremities non tender and without edema    Skin: no lesions or rashes     Neurological: normal gait, no gross " defects     Psychiatric: appropriate mood and affect                               Hematological: normal cervical, supraclavicular and inguinal lymph nodes     Gastrointestinal:       abdomen soft, non-tender, non-distended, no organomegaly or masses    Genitourinary: External genitalia and urethral meatus appears normal.  Vagina is smooth and without evidence of recurrence.  Cervix is completely obliterated from radiation changes and is flush with the vagina.  Recto-vaginal exam confirms these findings.      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     A total of 15 minutes was spent with the patient, >50% of which were spent in counseling the patient and/or treatment planning.      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  We reviewed her PET results without evidence of disease.  Her exam is also without evidence of recurrence.  She will thus enter surveillance. Discussed signs and symptoms of recurrence and to call if these should occur.  Discussed role of surveillance with history and physical exam and that labs/imaging would only be done based on symptoms but not routinely with the exception of a pap smear annually (due 6/18).   Discussed visits every 3 months for the first two years (6/19) then every 6 months for up to 5 years (6/22) then annually thereafter.  [place] continue these visits with Dr Valdez in [place] so she will call to arrange her next visit in 3 months.  I have assured her that she is welcome back here if any problems arise.     2.) Neuropathy.  Discussed that this could continue to improve for up to 1 year post-treatment.  Discussed treatment options and she declines any medications at this time.    3.) Per pt report her Cr was elevated yesterday so they would not give her IV  contrast.  Will plan repeat BMP today as I do not see the result from yesterday.    4.) Genetic risk factors were assessed and the patient does not meet the qualifications for a referral.      5.) Labs and/or tests ordered include:  BMP     6.) Health maintenance issues addressed today include pt is up to date.          Thank you for allowing us to participate in the care of your patient.         Sincerely,    Janet Raines MD  Gynecologic Oncology  [facility]       NELLIE LEROY

## 2017-10-24 NOTE — NURSING NOTE
"Oncology Rooming Note    October 24, 2017 12:46 PM   Savannah Camacho is a 77 year old female who presents for:    Chief Complaint   Patient presents with     Oncology Clinic Visit     return patient visit for 3 month follow up after chemo/radiation related to Cervical cancer (H)     Initial Vitals: /76  Pulse 76  Temp 97.7  F (36.5  C) (Oral)  Resp 17  Ht 1.651 m (5' 5\")  Wt 65.5 kg (144 lb 4.8 oz)  SpO2 96%  Breastfeeding? No  BMI 24.01 kg/m2 Estimated body mass index is 24.01 kg/(m^2) as calculated from the following:    Height as of this encounter: 1.651 m (5' 5\").    Weight as of this encounter: 65.5 kg (144 lb 4.8 oz). Body surface area is 1.73 meters squared.  No Pain (0) Comment: Data Unavailable   No LMP recorded. Patient is postmenopausal.  Allergies reviewed: Yes  Medications reviewed: Yes    Medications: Medication refills not needed today.  Pharmacy name entered into EPIC: Data Unavailable    Clinical concerns: no concerns dr. nuno was notified.    6 minutes for nursing intake (face to face time)     Akila Jenkins CMA              "

## 2017-10-24 NOTE — MR AVS SNAPSHOT
"              After Visit Summary   10/24/2017    Savannah Camacho    MRN: [id number]           Patient Information     Date Of Birth          1940        Visit Information        Provider Department      10/24/2017 1:00 PM Janet Magallanes MD [facility] [place] [facility]        Today's Diagnoses     Malignant neoplasm of overlapping sites of cervix (H)    -  1      Care Instructions    Patient will follow up in [place]          Follow-ups after your visit        Who to contact     If you have questions or need follow up information about today's clinic visit or your schedule please contact [facility] directly at [phone].  Normal or non-critical lab and imaging results will be communicated to you by [employer], letter or phone within 4 business days after the clinic has received the results. If you do not hear from us within 7 days, please contact the clinic through [employer]ce[employer] or phone. If you have a critical or abnormal lab result, we will notify you by phone as soon as possible.  Submit refill requests through [employer] or call your pharmacy and they will forward the refill request to us. Please allow 3 business days for your refill to be completed.          Additional Information About Your Visit        MyChart Information     [employer] gives you secure access to your electronic health record. If you see a primary care provider, you can also send messages to your care team and make appointments. If you have questions, please call your primary care clinic.  If you do not have a primary care provider, please call [phone] and they will assist you.        Care EveryWhere ID     This is your Care EveryWhere ID. This could be used by other organizations to access your [place] medical records  [id number]        Your Vitals Were     Pulse Temperature Respirations Height Pulse Oximetry Breastfeeding?    76 97.7  F (36.5  C) (Oral) 17 1.651 m (5' 5\") 96% No    BMI (Body Mass Index)    "                24.01 kg/m2            Blood Pressure from Last 3 Encounters:   10/24/17 148/76   07/21/17 148/69   07/21/17 116/72    Weight from Last 3 Encounters:   10/24/17 65.5 kg (144 lb 4.8 oz)   07/21/17 64.5 kg (142 lb 4.8 oz)   07/21/17 64.5 kg (142 lb 3.2 oz)                 Today's Medication Changes          These changes are accurate as of: 10/24/17  3:38 PM.  If you have any questions, ask your nurse or doctor.               Stop taking these medicines if you haven't already. Please contact your care team if you have questions.     LORazepam 1 MG tablet   Commonly known as:  ATIVAN   Stopped by:  Janet Magallanes MD           prochlorperazine 10 MG tablet   Commonly known as:  COMPAZINE   Stopped by:  Janet Magallanes MD                    Primary Care Provider Office Phone # Fax #    Adán Lanier [phone]       [address] [facility] [address]        Equal Access to Services     [facility]: Hadii aad ku hadasho Soomaali, waaxda luqadaha, qaybta kaalmada adeegyada, waxay guy hayhina bullock . So [facility] [phone].    ATENCIÓN: Si habla español, tiene a ordonez disposición servicios gratuitos de asistencia lingüística. Llame al [phone].    We comply with applicable federal civil rights laws and [place] laws. We do not discriminate on the basis of race, color, national origin, age, disability, sex, sexual orientation, or gender identity.            Thank you!     Thank you for choosing [facility]  for your care. Our goal is always to provide you with excellent care. Hearing back from our patients is one way we can continue to improve our services. Please take a few minutes to complete the written survey that you may receive in the mail after your visit with us. Thank you!             Your Updated Medication List - Protect others around you: Learn how to safely use, store and throw away your  medicines at www.disposemymeds.org.          This list is accurate as of: 10/24/17  3:38 PM.  Always use your most recent med list.                   Brand Name Dispense Instructions for use Diagnosis    acetaminophen 325 MG tablet    TYLENOL    100 tablet    Take 2 tablets (650 mg) by mouth every 4 hours as needed for mild pain or fever    Malignant neoplasm of exocervix (H)       albuterol 108 (90 BASE) MCG/ACT Inhaler    PROAIR HFA/PROVENTIL HFA/VENTOLIN HFA     Inhale 2 puffs into the lungs every 6 hours as needed Reported on 4/20/2017        ASPIRIN PO      Take 81 mg by mouth daily (with dinner)        COREG PO      Take 6.25 mg by mouth 2 times daily        hydrochlorothiazide 25 MG tablet    HYDRODIURIL     daily On hold for now 5/17/17, Reported on 5/16/2017        lisinopril 5 MG tablet    PRINIVIL/ZESTRIL     Take 5 mg by mouth 2 times daily Reported on 5/5/2017        loperamide 2 MG capsule    IMODIUM     Take 2 mg by mouth 4 times daily as needed for diarrhea        MULTIVITAMIN ADULT PO      Take by mouth every morning        SIMVASTATIN PO      Take 40 mg by mouth once On hold 5/17/17 per Gyn onc  Reported on 5/16/2017        SINGULAIR PO      Take 10 mg by mouth every evening Reported on 5/16/2017

## 2017-10-24 NOTE — PROGRESS NOTES
Consult Notes on Referred Patient        Dr. Adán Lanier MD  [address] [place] [address]       RE: Savannah Camacho  : 1940  FEMI: 10/24/2017    HPI:  Savannah Camacho is 77 year old with stage IIA2 cervical cancer.  She is accompanied today by her .  She is feeling well.  She does have some intermittent lower abdominal pain that has been present for months and is mild and intermittent.  She does note some residual neuropathy in her feet but feels this is improving.  Denies vaginal bleeding.    Cancer Course:    She initially sought care from a gynecologist in 2012 at which time she was noted to have a plaque of her vagina which was biopsied and found to be hyperkeratosis.  She was thus treated with steroids and kenolog with substantial improvement in her symptoms.  She then had a pap smear with ASCUS, HPV negative in .  Most recently she has developed increasing, foul smelling vaginal discharge and again sought care from her gynecologist who rightfully took her to the OR for EUA and biopsies.  On EUA she was found to have a confluent area of lesions in the proximal vagina mostly confined to the anterior wall. This area measured approximately 2 x 3 cm and appeared to have a whitish appearance. There was extension into the right vaginal fornix, where there was an area of necrosis appreciated. Again, the cervix was not able to be visualized on exam. Biopsies were taken of the right vaginal wall and posterior vaginal wall. Both of these were positive for well-differentiated squamous cell carcinoma.     3/10/17:  Upper vaginal biopsy:  Well differentiated squamous cell carcinoma    17:  MRI Pelvis:  Large exophytic heterogeneously enhancing with necrotic areas is seen arising from the upper vagina/fornix on the right side measuring 3.4 x 3.6 x 4 cm. Superiorly the lesion appears to involve the right lateral aspect of the cervix with extension into  the parametrium. No extension into the cervical canal. No definite extension into the uterus. Posteriorly there is involvement of the posterior wall of the upper vagina with extension across the midline to the left side. The lesion also abuts the midportion of the rectum with possible involvement.  Thrombosis of a right parametrial venous structure is noted.  A prominent right internal iliac lymph node measures 5 mm in short axis.  Small amount of free fluid is noted in the pelvis. Visualized bowel loops otherwise appear unremarkable. Pelvic musculature is of normal appearance. No suspicious osseous lesions.    4/20/17:  PET/CT:  1.  Cervical cancer arising from the right upper cervix, involving the upper vagina corresponds to known malignancy. 2. There are several mildly hypermetabolic lymph nodes in the pelvis including right internal iliac chain, right common iliac chain and left external iliac chain which he be treated as metastatic disease under proven otherwise. 3. Nodular thickening in the right lung apex, likely presents fibrosis or scarring. Recommend follow-up chest CT to ensure stability.     5/1/17-6/15/17:  Completed chemoradiation therapy for a total dose of 67.5 Gy    10/23/17:  PET/CT:  Official report pending but per verbal read no evidence of disease      Review of Systems:  Systemic           no weight changes; no fever; no chills; no night sweats; no appetite changes  Skin           no rashes, or lesions  Eye           no irritation; no changes in vision  Marlene-Laryngeal           no dysphagia; no hoarseness   Pulmonary    no cough; no shortness of breath  Cardiovascular    no chest pain; no palpitations  Gastrointestinal    no diarrhea; no constipation; no abdominal pain; no changes in bowel  habits; no blood in stool  Genitourinary   no urinary frequency; no urinary urgency; no dysuria; no pain; + abnormal vaginal discharge; no abnormal vaginal bleeding  Breast    no breast discharge; no breast  changes; no breast pain  Musculoskeletal    no myalgias; no arthralgias; no back pain  Psychiatric           no depressed mood; no anxiety    Hematologic            no tender lymph nodes; no noticeable swellings or lumps   Endocrine    no hot flashes; no heat/cold intolerance         Neurological   no tremor; + numbness and tingling; no headaches; no difficulty sleeping    Obstetrics and Gynecology History:  ,  x 4  Menopause in early 40's, HRT for a couple years    Past Medical History:  Past Medical History:   Diagnosis Date     Asthma      Cervical cancer (H)      Congestive heart failure (CHF) (H)      Hyperlipidemia      Hypertension      Lung nodule     Long standing Rt apical lung nodule     MI (myocardial infarction)     involving anterior wall        Past Surgical History:  Past Surgical History:   Procedure Laterality Date     BIOPSY OF VAGINA,SIMPLE  2017    also bx from ,      COLONOSCOPY       HEART CATH STENT COR W/WO PTCA       INSERT INTERSTITIAL NEEDLE, ULTRASOUND GUIDED N/A 2017    Procedure: INSERT INTERSTITIAL NEEDLE, ULTRASOUND GUIDED;  Insertion Interstitial Needle with Ultrasound Guidance Anesthesia Block;  Surgeon: Nicole Ward MD;  Location: [facility] OR     REMOVE RADIATION NEEDLES CERVIX N/A 6/15/2017    Procedure: REMOVE RADIATION NEEDLES CERVIX;  Removal of Radiation Needles and Keegan Template Cervix ;  Surgeon: Michelle Carrion MD;  Location: [facility] OR       Health Maintenance:  Last Pap Smear: See HPI    Last Mammogram:               Result: normal      She has not had a history of abnormal mammograms.    Last Colonoscopy:               Result: normal-repeat 10 years      Current Medications:   has a current medication list which includes the following prescription(s): loperamide, hydrochlorothiazide, lisinopril, montelukast sodium, carvedilol, multiple vitamins-minerals, aspirin, acetaminophen, simvastatin, and albuterol.     Allergies:   Amoxicillin  "     Social History:  Social History     Social History     Marital status:      Spouse name: N/A     Number of children: N/A     Years of education: N/A     Occupational History     Not on file.     Social History Main Topics     Smoking status: Never Smoker     Smokeless tobacco: Never Used     Alcohol use Yes      Comment: social     Drug use: No     Sexual activity: Not on file     Other Topics Concern     Not on file     Social History Narrative       Lives with , feels safe at home.  Retired from a dental office.  From [place].  Enjoys exercise, playing bridge, spending time with family and friends.  Does not have an advanced directive on file and would like her  to be her POA.  Would like full resuscitation if reversible cause is identified, however would not like to be kept on life sustaining measures long-term.     Family History:   The patient's family history is significant for.  Family History   Problem Relation Age of Onset     CANCER Other      Daughter had Breast Cancer at age 39         Physical Exam:   /76  Pulse 76  Temp 97.7  F (36.5  C) (Oral)  Resp 17  Ht 1.651 m (5' 5\")  Wt 65.5 kg (144 lb 4.8 oz)  SpO2 96%  Breastfeeding? No  BMI 24.01 kg/m2  Body mass index is 24.01 kg/(m^2).    General Appearance: healthy and alert, no distress     HEENT:  no thyromegaly, no palpable nodules or masses        Cardiovascular: regular rate and rhythm, no gallops, rubs or murmurs     Respiratory: lungs clear, no rales, rhonchi or wheezes, normal diaphragmatic excursion    Musculoskeletal: extremities non tender and without edema    Skin: no lesions or rashes     Neurological: normal gait, no gross defects     Psychiatric: appropriate mood and affect                               Hematological: normal cervical, supraclavicular and inguinal lymph nodes     Gastrointestinal:       abdomen soft, non-tender, non-distended, no organomegaly or " masses    Genitourinary: External genitalia and urethral meatus appears normal.  Vagina is smooth and without evidence of recurrence.  Cervix is completely obliterated from radiation changes and is flush with the vagina.  Recto-vaginal exam confirms these findings.      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     A total of 15 minutes was spent with the patient, >50% of which were spent in counseling the patient and/or treatment planning.      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  We reviewed her PET results without evidence of disease.  Her exam is also without evidence of recurrence.  She will thus enter surveillance. Discussed signs and symptoms of recurrence and to call if these should occur.  Discussed role of surveillance with history and physical exam and that labs/imaging would only be done based on symptoms but not routinely with the exception of a pap smear annually (due 6/18).   Discussed visits every 3 months for the first two years (6/19) then every 6 months for up to 5 years (6/22) then annually thereafter.  Given that she lives so far [place] she would like to continue these visits with Dr Valdez in [place] so she will call to arrange her next visit in 3 months.  I have assured her that she is welcome back here if any problems arise.     2.) Neuropathy.  Discussed that this could continue to improve for up to 1 year post-treatment.  Discussed treatment options and she declines any medications at this time.    3.) Per pt report her Cr was elevated yesterday so they would not give her IV contrast.  Will plan repeat BMP today as I do not see the result from yesterday.    4.) Genetic risk factors were assessed and the patient does not meet the qualifications for a referral.      5.) Labs and/or tests ordered include:  BMP     6.) Health  maintenance issues addressed today include pt is up to date.          Thank you for allowing us to participate in the care of your patient.         Sincerely,    Janet Raines MD  Gynecologic Oncology  [facility]       NELLIE LEROY

## 2018-02-15 ENCOUNTER — TRANSFERRED RECORDS (OUTPATIENT)
Dept: HEALTH INFORMATION MANAGEMENT | Facility: CLINIC | Age: 78
End: 2018-02-15

## 2019-04-09 NOTE — PATIENT INSTRUCTIONS
Contact Numbers    [facility] [place]: [phone]  [facility] T[place]:  [phone]    Call triage with chills and/or temperature greater than or equal to 100.5, uncontrolled nausea/vomiting, diarrhea, constipation, dizziness, shortness of breath, chest pain, bleeding, unexplained bruising, or any new/concerning symptoms, questions/concerns.     If you are having any concerning symptoms or wish to speak to a provider before your next infusion visit, please call your care coordinator or triage to notify them so we can adequately serve you.       After Hours: [phone]    If after hours, weekends, or holidays, call main hospital  and ask for Oncology doctor on call.           May 2017   [place] Monday Tuesday Wednesday Thursday Friday Saturday        1     US ABD/PEL DUPLEX COMPLETE    7:55 AM   (50 min.)   [id number]   [facility] [place], [facility]     [facility] EXTERNAL RADIATION TREATMT    2:00 PM   (15 min.)   [facility] RAD ONC VARIAN   [facility] 2     [facility] EXTERNAL RADIATION TREATMT    2:30 PM   (15 min.)   [facility] RAD ONC VARIAN   [facility] 3     [facility] EXTERNAL RADIATION TREATMT    2:30 PM   (15 min.)   [facility] RAD ONC VARIAN   [facility] 4     [facility] EXTERNAL RADIATION TREATMT    2:30 PM   (15 min.)   [facility] RAD ONC VARIAN   [facility]     [facility] ON TREATMENT VISIT    2:45 PM   (15 min.)   Michelle Carrion MD   [facility] 5     [facility] EXTERNAL RADIATION TREATMT    2:30 PM   (15 min.)   [facility] RAD ONC VARIAN   [facility]     LAB    3:00 PM   (15 min.)    LAB   [facility]     UMP RETURN ACTIVE TREATMENT    3:05 PM   (40 min.)   Kerry Hurtado APRN CNP   [facility] 6       7     8     UMP EXTERNAL RADIATION TREATMT    2:30 PM   (15 min.)   P RAD ONC VARIAN   [facility]cology [facility] 9     [facility] LAB DRAW    6:30 AM   (15 min.)    [facility] LAB DRAW   [facility] Lab Draw     UMP ONC INFUSION 360    7:00 AM   (360 min.)   GIRISH  ONCOLOGY INFUSION   [facility]     UMP EXTERNAL RADIATION TREATMT    2:30 PM   (15 min.)   UMP RAD ONC [facility]   [facility] 10     [facility] EXTERNAL RADIATION TREATMT    2:30 PM   (15 min.)   [facility] RAD ONC [facility]   [facility] 11     [facility] EXTERNAL RADIATION TREATMT    2:30 PM   (15 min.)   [facility] RAD ONC [facility]   Rad[facility]cology Clinic     [facility] ON TREATMENT VISIT    2:45 PM   (15 min.)   Michelle Carrion MD   [facility] 12     [facility] EXTERNAL RADIATION TREATMT    2:30 PM   (15 min.)   [facility] RAD ONC [facility]   [facility] 13       14     15     [facility] EXTERNAL RADIATION TREATMT    2:30 PM   (15 min.)   [facility] RAD ONC VARIAN   [facility] 16     [facility] DRAW    6:30 AM   (15 min.)   [facility] D[facility]   [facility] [employer] Lab Draw     P ONC INFUSION 360    7:00 AM   (360 min.)   [facility] ONCOLOGY INFUSION   [facility]     UMP EXTERNAL RADIATION TREATMT    2:30 PM   (15 min.)   UMP RAD ONC [facility]   [facility] 17     [facility] EXTERNAL RADIATION TREATMT    2:30 PM   (15 min.)   [facility] RAD ONC [facility]   Radiation Oncology Clinic     [facility] ON TREATMENT VISIT    2:45 PM   (15 min.)   Michelle Carrion MD   [facility] 18     [facility] EXTERNAL RADIATION TREATMT    2:30 PM   (15 min.)   [facility] RAD ONC [facility]   Rad[facility] Oncology Clinic 19     [facility] EXTERNAL RADIATION TREATMT    2:30 PM   (15 min.)   [facility] RAD ONC [facility]   [facility] 20       21     22     [facility] EXTERNAL RADIATION TREATMT    2:30 PM   (15 min.)   [facility] RAD ONC [facility]   Rad[facility]cology [facility] 23     [facility] DRAW    6:30 AM   (15 min.)   [facility] DRAW   [facility] Draw     P ONC INFUSION 360    7:00 AM   (360 min.)   [facility] ONCOLOGY INFUSION   [facility]     [facility] EXTERNAL RADIATION TREATMT    2:30 PM   (15 min.)   [facility] RAD ONC [facility]   Radiation Oncology Clinic 24     UMP EXTERNAL RADIATION TREATMT    2:30 PM   (15  min.)   UMP RAD ONC [facility]   Radiation Oncology Clinic 25     UMP EXTERNAL RADIATION TREATMT    2:30 PM   (15 min.)   [facility] RAD ONC [facility]   Radiation Oncology Clinic     [facility] ON TREATMENT VISIT    2:45 PM   (15 min.)   Michelle Carrion MD   [facility] 26     [facility] EXTERNAL RADIATION TREATMT    2:30 PM   (15 min.)   UMP RAD ONC [facility]   [facility] Oncology Clinic 27       28     29     30     [facility] DRAW    6:30 AM   (15 min.)    [facility] LAB DRAW   [facility] Lab Draw     UMP ONC INFUSION 360    7:00 AM   (360 min.)    [facility]    2:30 PM   (15 min.)   UMP RAD ONC [facility]   Radiation Oncology Clinic     MR PELVIS WWO    3:00 PM   (60 min.)   [id number]   [facility] [place], MRI 31     UMP EXTERNAL RADIATION TREATMT    2:30 PM   (15 min.)   P RAD ONC [facility]   Radiation Oncology Clinic                           June 2017 Sunday Monday Tuesday Wednesday Thursday Friday Saturday                       1     PAC EVAL   11:15 AM   (60 min.)   6,  Pac [place]   [facility]     PAC RN ASSESSMENT   12:15 PM   (30 min.)   Rn,  Pac   [facility]     PAC ANESTHESIA CONSULT   12:45 PM   (10 min.)   Anesthesiologist,  Pac   [facility]     [facility] EXTERNAL RADIATION TREATMT    2:30 PM   (15 min.)   [facility] RAD ONC [facility]   Radiation Oncology Clinic     [facility] ON TREATMENT VISIT    2:45 PM   (15 min.)   Jazzy Dave MD   [facility] Oncology Clinic 2     [facility] EXTERNAL RADIATION TREATMT    2:30 PM   (15 min.)   UMP RAD ONC [facility]   Radiation Oncology Clinic 3       4     5     [facility] DRAW    6:30 AM   (15 min.)    [facility] LAB DRAW   [facility] Draw     [facility] ONC INFUSION 360    7:00 AM   (360 min.)   [facility] ONCOLOGY INFUSION   [facility]    2:30 PM   (15 min.)   [facility] RAD ONC VARIAN    [facility]cology [facility] 6     7     8     9     10       11     12     13     [facility] TCT/SIM SUITE    6:00 AM   (90 min.)   Nicole Ward MD   Radiation Oncology Clinic     INSERT INTERSTITIAL NEEDLE, ULTRASOUND GUIDED    8:00 AM   Nicole Ward MD [facility] OR     [facility] TCT/SIM SUITE   11:00 AM   (60 min.)   Nicole Ward MD   Radiation Oncology Clinic     [facility] TCT/SIM SUITE    3:00 PM   (60 min.)   Nicole Ward MD   [facility] 14     P TCT/SIM SUITE    8:00 AM   (90 min.)   [facility] Carrion [facility]    2:30 PM   (60 min.)   Michelle Carrion MD   [facility] Oncology Clinic 15     [facility] TCT/SIM SUITE    8:00 AM   (60 min.)   Michelle Carrion MD   Radiation Oncology [facility] TCT/SIM SUITE    2:30 PM   (30 min.)   Michel[facility] Carrion [facility]    3:55 PM   (30 min.)   Michelle Carrion MD   Radiation Oncology Clinic     REMOVE RADIATION NEEDLES CERVIX    4:00 PM   Michelle Carrion MD [facility] OR 16     17       18     19     20     21     22     23     24       25     26     27     28     29     30                      Recent Results (from the past 24 hour(s))   CBC with platelets differential    Collection Time: 05/30/17  7:07 AM   Result Value Ref Range    WBC 2.3 (L) 4.0 - 11.0 10e9/L    RBC Count 3.09 (L) 3.8 - 5.2 10e12/L    Hemoglobin 9.4 (L) 11.7 - 15.7 g/dL    Hematocrit 27.9 (L) 35.0 - 47.0 %    MCV 90 78 - 100 fl    MCH 30.4 26.5 - 33.0 pg    MCHC 33.7 31.5 - 36.5 g/dL    RDW 14.7 10.0 - 15.0 %    Platelet Count 134 (L) 150 - 450 10e9/L    Diff Method Automated Method     % Neutrophils 63.0 %    % Lymphocytes 14.3 %    % Monocytes 9.6 %    % Eosinophils 12.2 %    % Basophils 0.0 %    % Immature Granulocytes 0.9 %    Nucleated RBCs 0 0 /100    Absolute Neutrophil 1.5 (L) 1.6 - 8.3 10e9/L    Absolute Lymphocytes 0.3 (L) 0.8 - 5.3 10e9/L    Absolute Monocytes 0.2 0.0 - 1.3 10e9/L    Absolute  Eosinophils 0.3 0.0 - 0.7 10e9/L    Absolute Basophils 0.0 0.0 - 0.2 10e9/L    Abs Immature Granulocytes 0.0 0 - 0.4 10e9/L    Absolute Nucleated RBC 0.0    Comprehensive metabolic panel    Collection Time: 05/30/17  7:07 AM   Result Value Ref Range    Sodium 138 133 - 144 mmol/L    Potassium 3.4 3.4 - 5.3 mmol/L    Chloride 101 94 - 109 mmol/L    Carbon Dioxide 27 20 - 32 mmol/L    Anion Gap 9 3 - 14 mmol/L    Glucose 110 (H) 70 - 99 mg/dL    Urea Nitrogen 8 7 - 30 mg/dL    Creatinine 0.72 0.52 - 1.04 mg/dL    GFR Estimate 79 >60 mL/min/1.7m2    GFR Estimate If Black >90   GFR Calc   >60 mL/min/1.7m2    Calcium 7.9 (L) 8.5 - 10.1 mg/dL    Bilirubin Total 0.3 0.2 - 1.3 mg/dL    Albumin 2.9 (L) 3.4 - 5.0 g/dL    Protein Total 6.8 6.8 - 8.8 g/dL    Alkaline Phosphatase 78 40 - 150 U/L    ALT 33 0 - 50 U/L    AST 28 0 - 45 U/L   Magnesium    Collection Time: 05/30/17  7:07 AM   Result Value Ref Range    Magnesium 1.4 (L) 1.6 - 2.3 mg/dL          5

## 2019-10-05 ENCOUNTER — HEALTH MAINTENANCE LETTER (OUTPATIENT)
Age: 79
End: 2019-10-05

## 2020-02-10 ENCOUNTER — HEALTH MAINTENANCE LETTER (OUTPATIENT)
Age: 80
End: 2020-02-10

## 2020-11-14 ENCOUNTER — HEALTH MAINTENANCE LETTER (OUTPATIENT)
Age: 80
End: 2020-11-14

## 2021-04-03 ENCOUNTER — HEALTH MAINTENANCE LETTER (OUTPATIENT)
Age: 81
End: 2021-04-03

## 2021-09-12 ENCOUNTER — HEALTH MAINTENANCE LETTER (OUTPATIENT)
Age: 81
End: 2021-09-12

## 2022-04-24 ENCOUNTER — HEALTH MAINTENANCE LETTER (OUTPATIENT)
Age: 82
End: 2022-04-24

## 2022-11-19 ENCOUNTER — HEALTH MAINTENANCE LETTER (OUTPATIENT)
Age: 82
End: 2022-11-19

## 2023-06-01 ENCOUNTER — HEALTH MAINTENANCE LETTER (OUTPATIENT)
Age: 83
End: 2023-06-01

## (undated) DEVICE — PREP SKIN SCRUB TRAY 4461A

## (undated) DEVICE — CATH PLUG W/CAP 000076

## (undated) DEVICE — DRAPE BACK TABLE  44X90" 8377

## (undated) DEVICE — GLOVE PROTEXIS W/NEU-THERA 6.5  2D73TE65

## (undated) DEVICE — GLOVE PROTEXIS W/NEU-THERA 6.0  2D73TE60

## (undated) DEVICE — WIPES FOLEY CARE SURESTEP PROVON DFC100

## (undated) DEVICE — DRSG ULCER ALGINATE COMFEEL PLUS 8X8" SQUARE 3120

## (undated) DEVICE — CATH TRAY FOLEY SURESTEP 16FR W/URNE MTR STLK LATEX A303316A

## (undated) DEVICE — LINEN TOWEL PACK X5 5464

## (undated) DEVICE — BASIN EMESIS STERILE  SSK9005A

## (undated) DEVICE — DRSG KERLIX 4 1/2"X4YDS ROLL 6715

## (undated) DEVICE — SPONGE PACK VAGINAL 2"X9

## (undated) DEVICE — BASIN SET SINGLE STERILE 13752-624

## (undated) DEVICE — SU ETHIBOND 0 CT-2 30" X412H

## (undated) DEVICE — PAD CHUX UNDERPAD 23X24" 7136

## (undated) DEVICE — LIGHT HANDLE X1 31140133

## (undated) DEVICE — SPONGE RAY-TEC 4X8" 7318

## (undated) DEVICE — DRSG STERI STRIP 1/2X4" R1547

## (undated) DEVICE — NDL COUNTER 20CT 31142493

## (undated) DEVICE — DRSG KERLIX 2 1/4"X3YDS ROLL 6720

## (undated) DEVICE — PITCHER STERILE 1000ML  SSK9004A

## (undated) DEVICE — JELLY LUBRICATING SURGILUBE 2OZ TUBE

## (undated) DEVICE — SOL WATER IRRIG 1000ML BOTTLE 2F7114

## (undated) RX ORDER — ONDANSETRON 2 MG/ML
INJECTION INTRAMUSCULAR; INTRAVENOUS
Status: DISPENSED
Start: 2017-06-13

## (undated) RX ORDER — LIDOCAINE HYDROCHLORIDE 20 MG/ML
INJECTION, SOLUTION EPIDURAL; INFILTRATION; INTRACAUDAL; PERINEURAL
Status: DISPENSED
Start: 2017-06-13

## (undated) RX ORDER — DEXAMETHASONE SODIUM PHOSPHATE 4 MG/ML
INJECTION, SOLUTION INTRA-ARTICULAR; INTRALESIONAL; INTRAMUSCULAR; INTRAVENOUS; SOFT TISSUE
Status: DISPENSED
Start: 2017-06-13

## (undated) RX ORDER — CLINDAMYCIN PHOSPHATE 900 MG/50ML
INJECTION, SOLUTION INTRAVENOUS
Status: DISPENSED
Start: 2017-06-13

## (undated) RX ORDER — PROPOFOL 10 MG/ML
INJECTION, EMULSION INTRAVENOUS
Status: DISPENSED
Start: 2017-06-13

## (undated) RX ORDER — PHENAZOPYRIDINE HYDROCHLORIDE 200 MG/1
TABLET, FILM COATED ORAL
Status: DISPENSED
Start: 2017-06-13

## (undated) RX ORDER — FENTANYL CITRATE 50 UG/ML
INJECTION, SOLUTION INTRAMUSCULAR; INTRAVENOUS
Status: DISPENSED
Start: 2017-06-13

## (undated) RX ORDER — PHENAZOPYRIDINE HYDROCHLORIDE 200 MG/1
TABLET, FILM COATED ORAL
Status: DISPENSED
Start: 2017-06-15

## (undated) RX ORDER — LIDOCAINE HYDROCHLORIDE 20 MG/ML
INJECTION, SOLUTION EPIDURAL; INFILTRATION; INTRACAUDAL; PERINEURAL
Status: DISPENSED
Start: 2017-06-15

## (undated) RX ORDER — SODIUM CHLORIDE, SODIUM LACTATE, POTASSIUM CHLORIDE, CALCIUM CHLORIDE 600; 310; 30; 20 MG/100ML; MG/100ML; MG/100ML; MG/100ML
INJECTION, SOLUTION INTRAVENOUS
Status: DISPENSED
Start: 2017-06-13

## (undated) RX ORDER — PROPOFOL 10 MG/ML
INJECTION, EMULSION INTRAVENOUS
Status: DISPENSED
Start: 2017-06-15

## (undated) RX ORDER — PHENYLEPHRINE HCL IN 0.9% NACL 1 MG/10 ML
SYRINGE (ML) INTRAVENOUS
Status: DISPENSED
Start: 2017-06-13

## (undated) RX ORDER — FENTANYL CITRATE 50 UG/ML
INJECTION, SOLUTION INTRAMUSCULAR; INTRAVENOUS
Status: DISPENSED
Start: 2017-06-15